# Patient Record
Sex: FEMALE | Race: WHITE | NOT HISPANIC OR LATINO | Employment: OTHER | URBAN - METROPOLITAN AREA
[De-identification: names, ages, dates, MRNs, and addresses within clinical notes are randomized per-mention and may not be internally consistent; named-entity substitution may affect disease eponyms.]

---

## 2017-02-01 ENCOUNTER — TELEPHONE (OUTPATIENT)
Dept: BEHAVIORAL HEALTH | Facility: CLINIC | Age: 72
End: 2017-02-01

## 2017-02-01 ENCOUNTER — APPOINTMENT (OUTPATIENT)
Dept: CT IMAGING | Facility: CLINIC | Age: 72
DRG: 885 | End: 2017-02-01
Attending: EMERGENCY MEDICINE
Payer: MEDICARE

## 2017-02-01 ENCOUNTER — HOSPITAL ENCOUNTER (INPATIENT)
Facility: CLINIC | Age: 72
LOS: 3 days | Discharge: HOME | DRG: 885 | End: 2017-02-04
Attending: EMERGENCY MEDICINE | Admitting: PSYCHIATRY & NEUROLOGY
Payer: MEDICARE

## 2017-02-01 DIAGNOSIS — F32.A DEPRESSION, UNSPECIFIED DEPRESSION TYPE: ICD-10-CM

## 2017-02-01 DIAGNOSIS — R45.851 SUICIDAL THOUGHTS: ICD-10-CM

## 2017-02-01 DIAGNOSIS — G43.019 INTRACTABLE MIGRAINE WITHOUT AURA AND WITHOUT STATUS MIGRAINOSUS: Primary | ICD-10-CM

## 2017-02-01 LAB
AMPHETAMINES UR QL SCN: NORMAL
ANION GAP SERPL CALCULATED.3IONS-SCNC: 7 MMOL/L (ref 3–14)
ANION GAP SERPL CALCULATED.3IONS-SCNC: NORMAL MMOL/L (ref 6–17)
BARBITURATES UR QL: NORMAL
BENZODIAZ UR QL: NORMAL
BUN SERPL-MCNC: 9 MG/DL (ref 7–30)
BUN SERPL-MCNC: NORMAL MG/DL (ref 7–30)
CALCIUM SERPL-MCNC: 8.3 MG/DL (ref 8.5–10.1)
CALCIUM SERPL-MCNC: NORMAL MG/DL (ref 8.5–10.1)
CANNABINOIDS UR QL SCN: NORMAL
CHLORIDE SERPL-SCNC: 104 MMOL/L (ref 94–109)
CHLORIDE SERPL-SCNC: NORMAL MMOL/L (ref 94–109)
CO2 SERPL-SCNC: 31 MMOL/L (ref 20–32)
CO2 SERPL-SCNC: NORMAL MMOL/L (ref 20–32)
COCAINE UR QL: NORMAL
CREAT SERPL-MCNC: 0.71 MG/DL (ref 0.52–1.04)
CREAT SERPL-MCNC: NORMAL MG/DL (ref 0.52–1.04)
D DIMER PPP FEU-MCNC: NORMAL UG/ML FEU (ref 0–0.5)
D DIMER PPP FEU-MCNC: NORMAL UG/ML FEU (ref 0–0.5)
ERYTHROCYTE [DISTWIDTH] IN BLOOD BY AUTOMATED COUNT: 11.9 % (ref 10–15)
GFR SERPL CREATININE-BSD FRML MDRD: 82 ML/MIN/1.7M2
GFR SERPL CREATININE-BSD FRML MDRD: NORMAL ML/MIN/1.7M2
GLUCOSE SERPL-MCNC: 94 MG/DL (ref 70–99)
GLUCOSE SERPL-MCNC: NORMAL MG/DL (ref 70–99)
HCT VFR BLD AUTO: 42 % (ref 35–47)
HGB BLD-MCNC: 14.6 G/DL (ref 11.7–15.7)
INTERPRETATION ECG - MUSE: NORMAL
MCH RBC QN AUTO: 33.8 PG (ref 26.5–33)
MCHC RBC AUTO-ENTMCNC: 34.8 G/DL (ref 31.5–36.5)
MCV RBC AUTO: 97 FL (ref 78–100)
OPIATES UR QL SCN: NORMAL
PCP UR QL SCN: NORMAL
PLATELET # BLD AUTO: 230 10E9/L (ref 150–450)
POTASSIUM SERPL-SCNC: 3.8 MMOL/L (ref 3.4–5.3)
POTASSIUM SERPL-SCNC: NORMAL MMOL/L (ref 3.4–5.3)
RBC # BLD AUTO: 4.32 10E12/L (ref 3.8–5.2)
SODIUM SERPL-SCNC: 142 MMOL/L (ref 133–144)
SODIUM SERPL-SCNC: NORMAL MMOL/L (ref 133–144)
TROPONIN I SERPL-MCNC: NORMAL UG/L (ref 0–0.04)
TROPONIN I SERPL-MCNC: NORMAL UG/L (ref 0–0.04)
WBC # BLD AUTO: 6.6 10E9/L (ref 4–11)

## 2017-02-01 PROCEDURE — 80048 BASIC METABOLIC PNL TOTAL CA: CPT | Performed by: EMERGENCY MEDICINE

## 2017-02-01 PROCEDURE — 90791 PSYCH DIAGNOSTIC EVALUATION: CPT

## 2017-02-01 PROCEDURE — 80307 DRUG TEST PRSMV CHEM ANLYZR: CPT | Performed by: EMERGENCY MEDICINE

## 2017-02-01 PROCEDURE — 80076 HEPATIC FUNCTION PANEL: CPT | Performed by: EMERGENCY MEDICINE

## 2017-02-01 PROCEDURE — 84443 ASSAY THYROID STIM HORMONE: CPT | Performed by: EMERGENCY MEDICINE

## 2017-02-01 PROCEDURE — 25000132 ZZH RX MED GY IP 250 OP 250 PS 637

## 2017-02-01 PROCEDURE — 80329 ANALGESICS NON-OPIOID 1 OR 2: CPT | Performed by: EMERGENCY MEDICINE

## 2017-02-01 PROCEDURE — 85027 COMPLETE CBC AUTOMATED: CPT | Performed by: EMERGENCY MEDICINE

## 2017-02-01 PROCEDURE — 85379 FIBRIN DEGRADATION QUANT: CPT | Performed by: EMERGENCY MEDICINE

## 2017-02-01 PROCEDURE — 12400006 ZZH R&B MH INTERMEDIATE

## 2017-02-01 PROCEDURE — 99285 EMERGENCY DEPT VISIT HI MDM: CPT | Mod: 25

## 2017-02-01 PROCEDURE — 25000132 ZZH RX MED GY IP 250 OP 250 PS 637: Mod: GY | Performed by: EMERGENCY MEDICINE

## 2017-02-01 PROCEDURE — 25000132 ZZH RX MED GY IP 250 OP 250 PS 637: Performed by: PSYCHIATRY & NEUROLOGY

## 2017-02-01 PROCEDURE — A9270 NON-COVERED ITEM OR SERVICE: HCPCS | Mod: GY | Performed by: EMERGENCY MEDICINE

## 2017-02-01 PROCEDURE — 93005 ELECTROCARDIOGRAM TRACING: CPT

## 2017-02-01 PROCEDURE — 70450 CT HEAD/BRAIN W/O DYE: CPT

## 2017-02-01 PROCEDURE — 84484 ASSAY OF TROPONIN QUANT: CPT | Performed by: EMERGENCY MEDICINE

## 2017-02-01 RX ORDER — RIZATRIPTAN BENZOATE 10 MG/1
10 TABLET ORAL
Status: DISCONTINUED | OUTPATIENT
Start: 2017-02-01 | End: 2017-02-04 | Stop reason: HOSPADM

## 2017-02-01 RX ORDER — ACETAMINOPHEN 500 MG
500 TABLET ORAL EVERY 4 HOURS PRN
Status: DISCONTINUED | OUTPATIENT
Start: 2017-02-01 | End: 2017-02-01

## 2017-02-01 RX ORDER — LEVOTHYROXINE SODIUM 100 UG/1
100 TABLET ORAL DAILY
Status: DISCONTINUED | OUTPATIENT
Start: 2017-02-01 | End: 2017-02-04 | Stop reason: HOSPADM

## 2017-02-01 RX ORDER — HYDROXYZINE HYDROCHLORIDE 25 MG/1
25-50 TABLET, FILM COATED ORAL EVERY 4 HOURS PRN
Status: DISCONTINUED | OUTPATIENT
Start: 2017-02-01 | End: 2017-02-04 | Stop reason: HOSPADM

## 2017-02-01 RX ORDER — MULTIPLE VITAMINS W/ MINERALS TAB 9MG-400MCG
3 TAB ORAL
Status: ON HOLD | COMMUNITY
End: 2023-04-07

## 2017-02-01 RX ORDER — VENLAFAXINE 100 MG/1
100 TABLET ORAL 2 TIMES DAILY
Status: DISCONTINUED | OUTPATIENT
Start: 2017-02-01 | End: 2017-02-04 | Stop reason: HOSPADM

## 2017-02-01 RX ORDER — LEVOTHYROXINE SODIUM 75 UG/1
75 TABLET ORAL DAILY
COMMUNITY

## 2017-02-01 RX ORDER — ACETAMINOPHEN 325 MG/1
650 TABLET ORAL ONCE
Status: COMPLETED | OUTPATIENT
Start: 2017-02-01 | End: 2017-02-01

## 2017-02-01 RX ORDER — ACETAMINOPHEN 500 MG
TABLET ORAL
Status: COMPLETED
Start: 2017-02-01 | End: 2017-02-01

## 2017-02-01 RX ORDER — TRAZODONE HYDROCHLORIDE 100 MG/1
100 TABLET ORAL AT BEDTIME
Status: DISCONTINUED | OUTPATIENT
Start: 2017-02-01 | End: 2017-02-03

## 2017-02-01 RX ORDER — ACETAMINOPHEN 500 MG
500-1000 TABLET ORAL EVERY 4 HOURS PRN
Status: DISCONTINUED | OUTPATIENT
Start: 2017-02-01 | End: 2017-02-04 | Stop reason: HOSPADM

## 2017-02-01 RX ORDER — TRAZODONE HYDROCHLORIDE 100 MG/1
100 TABLET ORAL AT BEDTIME
COMMUNITY

## 2017-02-01 RX ADMIN — ACETAMINOPHEN 500 MG: 500 TABLET, COATED ORAL at 20:46

## 2017-02-01 RX ADMIN — Medication 500 MG: at 20:46

## 2017-02-01 RX ADMIN — TRAZODONE HYDROCHLORIDE 100 MG: 100 TABLET ORAL at 20:45

## 2017-02-01 RX ADMIN — ACETAMINOPHEN 650 MG: 325 TABLET, FILM COATED ORAL at 17:34

## 2017-02-01 ASSESSMENT — ENCOUNTER SYMPTOMS
NAUSEA: 1
DYSPHORIC MOOD: 1
NERVOUS/ANXIOUS: 1
HEADACHES: 1

## 2017-02-01 ASSESSMENT — PAIN DESCRIPTION - DESCRIPTORS: DESCRIPTORS: HEADACHE

## 2017-02-01 NOTE — IP AVS SNAPSHOT
MRN:[id number]                      After Visit Summary   2/1/2017    Debbie Boucher    MRN: [id number]           Thank you!     Thank you for choosing [facility] for your care. Our goal is always to provide you with excellent care.        Patient Information     Date Of Birth          1945        About your hospital stay     You were admitted on:  February 1, 2017 You last received care in the:  [facility]    You were discharged on:  February 4, 2017       Who to Call     For medical emergencies, please call 911.  For non-urgent questions about your medical care, please call your primary care provider or clinic, [phone]          Attending Provider     Provider    Ira Wilder MD Winegarden, Thomas C, MD [place], Doroteo Lund MD       Primary Care Provider Office Phone # Fax #    [facility] [phone] [address] [facility] [address]        Further instructions from your care team       Behavioral Discharge Planning and Instructions    Summary: Admitted to hospital with suicidal ideation    Main Diagnosis: Major Depressive Disorder; Panic Disorder without agoraphobia     Major Treatments, Procedures and Findings: Psychiatric assessment; Neurology consult    Symptoms to Report: Mood getting worse or Thoughts of suicide    Lifestyle Adjustment: Develop and follow safety plan. Recommendation to re-engage in therapy to look at conflicts about pain , purpose and finding pleasure in life.    Psychiatry Follow-up:     You have a follow up psychiatry appointment with Freddie COLE at [facility] in [place] on Thursday February 16, 2017 at 9:00 am. Please arrive at 8:30 am to complete paperwork.     [facility]  [address], [address]  [place]  [address]  Phone:  [phone] / Fax:  [phone]    You have a therapy appointment scheduled with Mejia  "Supa Cobb LP at [facility] in [place] on Tuesday February 7, 2017 at 11:00 am. Please complete intake forms and bring them with you to this appointment.     [facility]  [address]  [place]  [address]  Phone:  [phone] / Fax:  [phone]    Primary care: Dr. Wendy Bergeron [facility]  [address]  . [place], [place] [address] [phone]    Resources:   Crisis Intervention: 203.471.6724 or 500-801-4743 (TTY: 841.620.9426).  Call anytime for help.  National [employer] on Mental Illness (www.[place].wilber.org): 234.974.1497 or 323-496-3195.  National Suicide Prevention Line (www.mentalhealthmn.org): 445-610-QZYX (7560)    General Medication Instructions:   See your medication sheet(s) for instructions.   Take all medicines as directed.  Make no changes unless your doctor suggests them.   Go to all your doctor visits.  Be sure to have all your required lab tests. This way, your medicines can be refilled on time.  Do not use any drugs not prescribed by your doctor.  Avoid alcohol.        Pending Results     No orders found from 1/31/2017 to 2/2/2017.            Statement of Approval     Ordered          02/03/17 1153  I have reviewed and agree with all the recommendations and orders detailed in this document.   EFFECTIVE NOW     Approved and electronically signed by:  Doroteo Dutton MD             Admission Information        Provider Department Dept Phone    2/1/2017 Doroteo Dutton MD [facility] [phone]      Your Vitals Were     Blood Pressure Pulse Temperature Respirations Weight Pulse Oximetry    147/67 mmHg 58 97.7  F (36.5  C) (Oral) 16 54.159 kg (119 lb 6.4 oz) 99%      MyChart Information     [employer] lets you send messages to your doctor, view your test results, renew your prescriptions, schedule appointments and more. To sign up, go to [email or web address] . Click on \"Log in\" on the left side of the " "screen, which will take you to the Welcome page. Then click on \"Sign up Now\" on the right side of the page.     You will be asked to enter the access code listed below, as well as some personal information. Please follow the directions to create your username and password.     Your access code is: [email or web address]  Expires: 2017  2:29 PM     Your access code will  in 90 days. If you need help or a new code, please call your [facility] clinic or [phone].        Care EveryWhere ID     This is your Care EveryWhere ID. This could be used by other organizations to access your [facility] medical records  [id number]           Review of your medicines      START taking        Dose / Directions    hydrOXYzine 25 MG tablet   Commonly known as:  ATARAX   Used for:  Depression, unspecified depression type        Dose:  25-50 mg   Take 1-2 tablets (25-50 mg) by mouth every 6 hours as needed for anxiety   Quantity:  120 tablet   Refills:  0       mirtazapine 7.5 MG Tabs tablet   Commonly known as:  REMERON   Used for:  Depression, unspecified depression type        Dose:  7.5 mg   Take 1 tablet (7.5 mg) by mouth At Bedtime   Quantity:  30 tablet   Refills:  0         CONTINUE these medicines which have NOT CHANGED        Dose / Directions    LEVOTHYROXINE SODIUM PO   Indication:  Underactive Thyroid        Dose:  100 mcg   Take 100 mcg by mouth daily   Refills:  0       MAXALT PO        Dose:  10 mg   Take 10 mg by mouth once as needed for migraine   Refills:  0       multivitamin, therapeutic with minerals Tabs tablet        Dose:  3 tablet   Take 3 tablets by mouth 2 times daily   Refills:  0       TRAZODONE HCL PO   Indication:  Trouble Sleeping, may repeat times 1        Dose:  100 mg   Take 100 mg by mouth At Bedtime   Refills:  0       VENLAFAXINE HCL PO   Indication:  Major Depressive Disorder        Dose:  100 mg   Take 100 mg by mouth 2 times daily   Refills:  0            Where to get your medicines      These " medications were sent to [facility] [place], [place] - [address]  [address] [place] [address]     Phone:  [phone]    - hydrOXYzine 25 MG tablet  - mirtazapine 7.5 MG Tabs tablet             Protect others around you: Learn how to safely use, store and throw away your medicines at www.disposemymeds.org.             Medication List: This is a list of all your medications and when to take them. Check marks below indicate your daily home schedule. Keep this list as a reference.      Medications           Morning Afternoon Evening Bedtime As Needed    hydrOXYzine 25 MG tablet   Commonly known as:  ATARAX   Take 1-2 tablets (25-50 mg) by mouth every 6 hours as needed for anxiety                                LEVOTHYROXINE SODIUM PO   Take 100 mcg by mouth daily   Last time this was given:  100 mcg on 2/3/2017  8:47 AM                                MAXALT PO   Take 10 mg by mouth once as needed for migraine                                mirtazapine 7.5 MG Tabs tablet   Commonly known as:  REMERON   Take 1 tablet (7.5 mg) by mouth At Bedtime   Last time this was given:  7.5 mg on 2/2/2017  8:47 PM                                multivitamin, therapeutic with minerals Tabs tablet   Take 3 tablets by mouth 2 times daily                                TRAZODONE HCL PO   Take 100 mg by mouth At Bedtime   Last time this was given:  100 mg on 2/2/2017  8:47 PM                                VENLAFAXINE HCL PO   Take 100 mg by mouth 2 times daily   Last time this was given:  100 mg on 2/3/2017  9:15 PM                                          More Information        Depression: Tips to Help Yourself  As your health care providers help treat your depression, you can also help yourself. Keep in mind that your illness affects you emotionally, physically, mentally, and socially. So full recovery will take time. Take care of your body and your soul, and be patient with yourself as you get  better.    Be with others  Don t isolate yourself--you ll only feel worse. Try to be with other people. And take part in fun activities when you can. Go to a movie, ballgame, Mandaeism service, or social event. Talk openly with people you can trust. And accept help when it s offered.  Keep your perspective    Depression can cloud your judgment. So wait until you feel better before making major life decisions, such as changing jobs, moving, or getting  or .    This illness is not your fault. Don t blame yourself for your depression.    Recovering from depression is a process. Don t be discouraged if it takes some time to feel better.    Depression saps your energy and concentration. So you won t be able to do all the things you used to do. Set small goals and do what you can.  Take care of your body  People with depression often lose the desire to take care of themselves. That only makes their problems worse. During treatment and afterward, make a point to:    Exercise. It s a great way to take care of your body. And studies have shown that exercise helps fight depression.    Avoid drugs and alcohol. These may ease the pain in the short term. But they ll only make your problems worse in the long run.    Get relief from stress. Ask your healthcare provider for relaxation exercises and techniques to help relieve stress.    Eat right. A balanced and healthy diet helps keep your body healthy.    4392-2038 [employer]. [address] [place] [address], [place] [address]. All rights reserved. This information is not intended as a substitute for professional medical care. Always follow your healthcare professional's instructions.

## 2017-02-01 NOTE — IP AVS SNAPSHOT
[place] [address] [place] [address]    Phone:  [phone]                                       After Visit Summary   2/1/2017    Debbie Boucher    MRN: [id number]           After Visit Summary Signature Page     I have received my discharge instructions, and my questions have been answered. I have discussed any challenges I see with this plan with the nurse or doctor.    ..........................................................................................................................................  Patient/Patient Representative Signature      ..........................................................................................................................................  Patient Representative Print Name and Relationship to Patient    ..................................................               ................................................  Date                                            Time    ..........................................................................................................................................  Reviewed by Signature/Title    ...................................................              ..............................................  Date                                                            Time

## 2017-02-01 NOTE — TELEPHONE ENCOUNTER
S - Verenice LOPEZ from [facility] in [place] regarding 71 female with SI and plan.     B - Pt had episode this morning, possible panic attack.  EMT's evaluated pt, recommended pt go to urgent care.  During visit, pt revealed severe depression, suicidal with plan to drive to [place] to get meds to take until she falls asleep.  Pt has no drive to live any longer.  Pt does not feel safe or that she would be able to reach out for help if got the urge to attempt.  Pt thinks about suicide at least weekly.  Pt on Effexor prescribed by PCP, Arlin Young.  Pt ran out and has not taken for 2 days. Pt also takes trazodone to help her sleep.  Pt lives in [place] by herself, no services being utilized currently. No reported cognitive deficits.      Hx of hypothyroidism, osteoporosis, chronic headaches.  Pt stated that she's lonely, doesn't feel that she can't reach out to her family members because she doesn't want to be a burden.  Pt's son is with her, pt has been isolating from family lately per son.      A - pt will need MH evaluation for increased depression and suicidal ideation    R - Pt's son will be bringing pt to [facility] ED for MH.

## 2017-02-01 NOTE — ED NOTES
Debbie reported not feeling well for the last few days.  She was at home and she had a panic attack , she has been feeling overwhelmed and frustrated. She thought about suicide but does not have a plan.  She has been suffering of headaches, rates her pain at 7 in the scale of 0 to 10.  Headaches have been really bad for the last 2 months, takes tylenol with no relief.  Suffers from depression and anxiety, she has never seen a psychiatrist before, medications are ordered by Internal Medicine Doctor.  She is a pleasant lady, lying in bed, denies any suicidal ideation and contracts for safety.  Her son is with her in room.  Admission completed.

## 2017-02-01 NOTE — ED PROVIDER NOTES
"  History     Chief Complaint:  Suicidal Ideation    HPI   Debbie Boucher is a 71 year old female with a history of depressive disorder, anxiety, and migraines who presents to the emergency department today for evaluation of suicidal ideation. She reports having not taken her medications for anxiety or depression for the past two days as she ran out. Earlier she was seen at urgent care because of possible panic attack but does not have a history of them. She felt \"overloaded\" with emotion this morning in an attempt to keep all her feelings of depression and anxiety inside her. The episode included her having physical discomfort with nausea but no chest pain. Felt weak all over her body like she was going to pass out. Went to  where it was reported that she a plan to kill her self in [place]. She then called her son who wanted her to come into the ED for evaluation. For about the last several years she has had a headache for which she is taking Tylenol every 4 hours. The patient has not been using any drugs. The patient has looked into a plan to commit suicide including leaving the [place] through [employer]. Wants to kill herself on her own timing. Does not want to be a burden on her son. Does not feel that anyone would miss her. She feels that she has no hope of getting relief from her pain from her headache.  Has seen several providers for headaches but not neurology. The patient has not seen a therapist or psychologist recently. Has not done anything to harm herself.    Allergies:  Codeine  Contrast Dye  Levofloxacin  Sulfa Drugs     Medications:    The patient is currently on no regular medications.     Past Medical History:    Depressive disorder  Anxiety  Migraines     Past Surgical History:    Tonsillectomy and adenoidectomy  Appendectomy  Cholecystectomy  Hernia repair  Hysterectomy  Bladder neck suspension      Family History:    History reviewed. No pertinent family history.      Social " History:  The patient was accompanied to the ED by son.  Marital Status:   [2]    Review of Systems   Cardiovascular: Negative for chest pain.   Gastrointestinal: Positive for nausea.   Neurological: Positive for headaches.   Psychiatric/Behavioral: Positive for suicidal ideas and dysphoric mood. The patient is nervous/anxious.    All other systems reviewed and are negative.    Physical Exam     Patient Vitals for the past 24 hrs:   BP Temp Temp src Heart Rate Resp SpO2 Weight   02/01/17 1849 - 98  F (36.7  C) Oral - - - 54.159 kg (119 lb 6.4 oz)   02/01/17 1407 (!) 137/125 mmHg 97.6  F (36.4  C) Oral 69 12 99 % -       Physical Exam  General: Resting comfortably on the gurney  Eyes:  The pupils are equal and round  ENT:    Atraumatic  Neck:  Normal range of motion    No neck stiffness  CV:  Regular rate and rhythm    Skin warm and well perfused   Resp:  Lungs are clear    Non-labored    No rales    No wheezing  MS:  Normal muscular tone  Skin:  No rash or acute skin lesions noted  Neuro:   Awake, alert.      Speech is normal and fluent.    Face is symmetric.     Moves all extremities  Psych:  Normal affect. Good eye contact. Appropriate interactions.     Emergency Department Course     ECG:  ECG taken at 1528, ECG read at 1533  Normal sinus rhythm  Normal ECG  Rate 63 bpm. GA interval 140. QRS duration 86. QT/QTc 434/444. P-R-T axes 44 32 53.     Imaging:  Radiology findings were communicated with the patient who voiced understanding of the findings.    Head CT w/o contrast   IMPRESSION: Normal atrophy. Nothing acute.     Laboratory:  Laboratory findings were communicated with the patient who voiced understanding of the findings.    Drug abuse screen 77 urine: negative  CBC: WBC 6.6, HGB 14.6,   BMP: Calcium: 8.3 (L) o/w WNL (Creatinine 0.71)  Troponin (Collected 1704): <0.015  D Dimer (Collected 1704): <0.3    Interventions:  1734 Tylenol 650 mg oral       Emergency Department Course:  Nursing notes  and vitals reviewed.  I performed an exam of the patient as documented above.   The patient was sent for a head CT without contrast while in the emergency department, results above.    IV was inserted and blood was drawn for laboratory testing, results above.  The patient provided a urine sample here in the emergency department. This was sent for laboratory testing, findings above.  I have placed the patient on a 72 hour hold.    I discussed the treatment plan with the patient. They expressed understanding of this plan and consented to admission.   I personally reviewed the laboratory and imaging results with the patient and answered all related questions prior to admission.    Impression & Plan      Medical Decision Making:  Debbie Boucher is a 71 year old female who presents to the emergency department with depression, suicidal thoughts. Vital signs within normal limits. She was sent here from clinic after reported concerning thoughts of wanting to die with plan. She reports to me that she has had worsening depression and has been searching how to end her life at a time that she wants to. Has not acted on this but is actively trying to figure out how to do it. High risk given age, lives alone. Also complains of what sounds like presyncopal episode this morning and chronic headaches. She feels that what happened this morning is likely related to the anxiety and stress that has been going on and more related to her mental health than physical health at this point. EKG shows sinus rhythm without acute ischemic changes. Troponin, DDimer, BMP, CBC were unremarkable. CT head also unremarkable. Patient medically clear and evaluated by DEC who also felt that patient should be admitted to the hospital for psychiatric stabilization. Placed on a 72 hour hold and patient has a bed available here. Seems to minimize her depression and suicidal thoughts/plan to people and is not very forthcoming as she did not tell the  initial nurse that she has a plan.    Diagnosis:    ICD-10-CM    1. Depression, unspecified depression type F32.9    2. Suicidal thoughts R45.851      Disposition:   Admission    Scribe Disclosure:  I, Coleman Roberson, am serving as a scribe at 2:09 PM on 2/1/2017 to document services personally performed by Ira Wilder MD, based on my observations and the provider's statements to me.   2/1/2017    EMERGENCY DEPARTMENT        Ira Wilder MD  02/02/17 0000

## 2017-02-02 LAB
ALBUMIN SERPL-MCNC: 3.8 G/DL (ref 3.4–5)
ALP SERPL-CCNC: 68 U/L (ref 40–150)
ALT SERPL W P-5'-P-CCNC: 23 U/L (ref 0–50)
APAP SERPL-MCNC: 3 MG/L (ref 10–20)
AST SERPL W P-5'-P-CCNC: 30 U/L (ref 0–45)
BILIRUB DIRECT SERPL-MCNC: <0.1 MG/DL (ref 0–0.2)
BILIRUB SERPL-MCNC: 0.2 MG/DL (ref 0.2–1.3)
PROT SERPL-MCNC: 7.2 G/DL (ref 6.8–8.8)
TSH SERPL DL<=0.005 MIU/L-ACNC: 0.65 MU/L (ref 0.4–4)

## 2017-02-02 PROCEDURE — A9270 NON-COVERED ITEM OR SERVICE: HCPCS | Mod: GY | Performed by: PHYSICIAN ASSISTANT

## 2017-02-02 PROCEDURE — 25000132 ZZH RX MED GY IP 250 OP 250 PS 637: Mod: GY | Performed by: PHYSICIAN ASSISTANT

## 2017-02-02 PROCEDURE — 25000132 ZZH RX MED GY IP 250 OP 250 PS 637: Mod: GY | Performed by: PSYCHIATRY & NEUROLOGY

## 2017-02-02 PROCEDURE — 25000132 ZZH RX MED GY IP 250 OP 250 PS 637: Performed by: INTERNAL MEDICINE

## 2017-02-02 PROCEDURE — 99223 1ST HOSP IP/OBS HIGH 75: CPT | Mod: AI | Performed by: PHYSICIAN ASSISTANT

## 2017-02-02 PROCEDURE — 90791 PSYCH DIAGNOSTIC EVALUATION: CPT

## 2017-02-02 PROCEDURE — A9270 NON-COVERED ITEM OR SERVICE: HCPCS | Mod: GY | Performed by: PSYCHIATRY & NEUROLOGY

## 2017-02-02 PROCEDURE — 12400006 ZZH R&B MH INTERMEDIATE

## 2017-02-02 RX ORDER — MIRTAZAPINE 7.5 MG/1
7.5 TABLET, FILM COATED ORAL AT BEDTIME
Status: DISCONTINUED | OUTPATIENT
Start: 2017-02-02 | End: 2017-02-03

## 2017-02-02 RX ORDER — BUTALBITAL/ASPIRIN/CAFFEINE 50-325-40
1 CAPSULE ORAL EVERY 4 HOURS PRN
Status: DISCONTINUED | OUTPATIENT
Start: 2017-02-02 | End: 2017-02-04 | Stop reason: HOSPADM

## 2017-02-02 RX ORDER — HYDROCODONE BITARTRATE AND ACETAMINOPHEN 5; 325 MG/1; MG/1
1 TABLET ORAL ONCE
Status: COMPLETED | OUTPATIENT
Start: 2017-02-02 | End: 2017-02-02

## 2017-02-02 RX ADMIN — TRAZODONE HYDROCHLORIDE 100 MG: 100 TABLET ORAL at 20:47

## 2017-02-02 RX ADMIN — MIRTAZAPINE 7.5 MG: 7.5 TABLET, FILM COATED ORAL at 20:47

## 2017-02-02 RX ADMIN — ACETAMINOPHEN 1000 MG: 500 TABLET, COATED ORAL at 05:29

## 2017-02-02 RX ADMIN — BUTALBITAL, ASPIRIN, AND CAFFEINE 1 CAPSULE: 50; 325; 40 CAPSULE ORAL at 12:20

## 2017-02-02 RX ADMIN — BUTALBITAL, ASPIRIN, AND CAFFEINE 1 CAPSULE: 50; 325; 40 CAPSULE ORAL at 20:44

## 2017-02-02 RX ADMIN — HYDROCODONE BITARTRATE AND ACETAMINOPHEN 1 TABLET: 5; 325 TABLET ORAL at 01:26

## 2017-02-02 RX ADMIN — LEVOTHYROXINE SODIUM 100 MCG: 100 TABLET ORAL at 08:14

## 2017-02-02 RX ADMIN — VENLAFAXINE 100 MG: 100 TABLET ORAL at 08:14

## 2017-02-02 RX ADMIN — VENLAFAXINE 100 MG: 100 TABLET ORAL at 20:47

## 2017-02-02 RX ADMIN — BUTALBITAL, ASPIRIN, AND CAFFEINE 1 CAPSULE: 50; 325; 40 CAPSULE ORAL at 16:47

## 2017-02-02 RX ADMIN — ACETAMINOPHEN 1000 MG: 500 TABLET, COATED ORAL at 08:38

## 2017-02-02 ASSESSMENT — ACTIVITIES OF DAILY LIVING (ADL)
DRESS: SCRUBS (BEHAVIORAL HEALTH)
ORAL_HYGIENE: INDEPENDENT
GROOMING: INDEPENDENT

## 2017-02-02 NOTE — H&P
"PSYCHIATRIC EVALUATION      DATE OF ADMISSION:  02/01/2017.      DATE OF SERVICE:  02/02/2017.        IDENTIFYING DATA AND REASON FOR REFERRAL:  Debbie Boucher is a 71-year-old woman who reports she is  and has 2 children.  She reports she is a retired  who presented to the ED here at [facility] on account of depression and suicidal ideation.  She was admitted on a 72-hour hold that expires on 02/06/2017 at 6:34 p.m.      CHIEF COMPLAINT:  \"My depression has just gotten bad.\"      HISTORY OF PRESENT ILLNESS:  Debbie Boucher reports that she has been on Effexor for about 20 years.  She reports that she had not changed the dose of the medication in more than 10 years.  She states she had been doing quite well until a few months ago when she developed persistent and intractable headaches.  She states she takes Tylenol pretty much several times a day to alleviate her discomfort, but this has not been very beneficial.  She states she has a history of anxiety and depression and has been contemplating suicide because she thinks she may have a life ending condition that is yet to be diagnosed.  She reports she has contemplated ways of ending her life on her own terms because she believes that she has a right to do that.  Per her report, she had considered going to [place] to get medications to prepare herself for such eventuality but at this time she denies that she is actively planning to harm herself.  She states she never wants to be a burden to her children and so she was thinking should she be diagnosed with a life-threatening condition she wanted that option.  She had been prescribed Effexor by her primary care physician, Dr. Arlin Young, but she ran out of the medications about a couple days ago.  She states she has experienced anhedonia, depressed mood, poor appetite, frequent awakenings and weight loss.  She states she occasionally experiences " racing thoughts, but denies symptoms suggestive of suzan or psychosis.  She denies any cognitive deficits but also reports that she experiences panic attacks characterized by palpitations, shortness of breath, restlessness and difficulty concentrating.  She admits that there are times that she feels that her family would be better off without her.  She thinks there has no purpose for her being alive.  She reports that she and her ex- do get along as they were  for 42 years before they got .  She denies use of alcohol or illicit chemicals.  She reports she is yet to see a neurologist regarding her headaches.      PAST PSYCHIATRIC HISTORY:  The patient denies previous psychiatric hospitalization.  She states she does not have a psychiatrist but previously saw someone named Bob for individual psychotherapy.  She states she has not seen a therapist in many years.      CHEMICAL USE HISTORY:  None reported.      PAST MEDICAL HISTORY:  Chronic headaches, migraines and hypothyroidism.      PAST SURGICAL HISTORY:  Tonsillectomy and adenoidectomy, appendectomy, cholecystectomy, hernia repair, hysterectomy, bladder neck suspension and gastric bypass.      MEDICATIONS PRIOR TO ADMISSION:   1.  Synthroid 100 mcg p.o. daily.   2.  Effexor 100 mg p.o. b.i.d.   3.  Trazodone 100 mg p.o. each day at bedtime.   4.  Multivitamin 3 p.o. b.i.d.   5.  Maxalt 10 mg p.o. once daily p.r.n. migraine.      ALLERGIES:  Codeine, contrast dye, levofloxacin and sulfa drugs.      FAMILY PSYCHIATRIC HISTORY:  None reported.      SOCIAL HISTORY:  The patient reports she grew up in [place], [place].  She had an older brother and a younger brother, both of whom are .  She reports that her parents had a tough life.  Her father reportedly had a hole in the heart and her mother was obese and diabetic.  She states that her parents were very stoic and authoritarian.  She reports she graduated high school.  Prior to that  her parents moved the family to [place].  She met her  in [place] as he was in the [employer].  Shortly after they got  they moved to [facility] where they resided for a little over 2 years before her  decided to go back to school.  [place] he went to school.  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  She states she and her  decided to separate about 10 years ago because she had grown more independent from the passive and submissive individual she had been earlier on in their marriage.  She states her  could not adjust to her more assertive personality and so they decided to separate.  They ultimately got  8 years ago but remain friends.  She denies criminal or  history.      REVIEW OF SYSTEMS:  A 10-point review of systems was negative apart from the pertinent positives in the history of present illness.      PHYSICAL EXAMINATION:   VITAL SIGNS:  Blood pressure 160/92, pulse 58, respirations 16, temperature 97.8.  Weight 119 pounds.      MENTAL STATUS EXAMINATION:  Debbie Boucher is a 71-year-old lady who appears her stated age.  She is dressed in hospital scrubs and ambulates on her own without difficulty.  She makes good eye contact.  She speaks softly but clearly and coherently.  Her mood is depressed and her affect is restricted in range.  Her thought process is logical, relevant and goal directed.  Her fund of knowledge is adequate.  She is alert and oriented to time, place and person.  Her gait and station are within normal limits.  Her associations are tight and her language is appropriate.  Her recall of recent and remote events is adequate.  Her impulse control is marginal.  Her attention and concentration are fair.  She displays limited insight and judgment.  Risk assessment  at this time is considered moderate to high on account of her feelings of hopelessness, worthlessness and a perception of having a right to end her life.      DIAGNOSTIC IMPRESSION:  Ramya Cm is a 71-year-old lady with known history of anxiety and depression who presents to the hospital for evaluation on account of worsening symptoms in the context of going off her long-term antidepressant medications a couple days ago because she could not get a refill from her primary care physician.  She reports florid symptoms of depression as well as persistent migraine type headaches that have been going on for more than 3 months without relief.      DIAGNOSES:   1.  Major depressive disorder, recurrent, severe without psychosis.   2.  Panic disorder without agoraphobia.   3.  Migraines.   4.  Hypothyroidism.      RECOMMENDATIONS:  The patient will be maintained on the ITC and moved to step-down unit when a bed becomes available.  She is currently denying any self-harm thoughts or plans.  She will be started on mirtazapine at 7.5 mg at bedtime while maintaining her currently prescribed dose of venlafaxine at 100 mg b.i.d.  She will be encouraged to participate in individual milieu and group therapy.  She will benefit from outpatient psychotherapy and medication management.  On account of her persistent migraine type headaches Neurology has been consulted by the hospitalist and we look forward to their consultation.  Estimated length of stay 3-4 days.         YVONNE JOHNS MD             D: 2017 12:42   T: 2017 13:21   MT: DOROTEO      Name:     RAMYA CM   MRN:      [id number]        Account:      [id number]   :      1945           Admitted:     [id number]      Document: [id number]

## 2017-02-02 NOTE — PLAN OF CARE
Problem: Depressive Symptoms  Goal: Depressive Symptoms  1. Mood stability   2. Absence of suicidal ideation/contracts for safety   3. Depressive s/sx resolved  4. Safety plan in place  5. Positive coping skills identified/utilized  6. Medication regiment established/compliance  7. Adequate sleep  8. Housing community support  9. F/u plan in place   Outcome: No Change  Debbie reported not feeling well for the last few days.  She was at home and she had a panic attack , she has been feeling overwhelmed and frustrated. She thought about suicide but does not have a plan.  She has been suffering of headaches, rates her pain at 7 in the scale of 0 to 10.  Headaches have been really bad for the last 2 months, takes tylenol with no relief. Fioricet ordered for relief with good results today.   Suffers from depression and anxiety, she has never seen a psychiatrist before today. She is a pleasant lady,ambulating between her room and the lounge.Denies any suicidal ideation and contracts for safety.

## 2017-02-02 NOTE — PROGRESS NOTES
Pt c/o of severe headache, Tylenol given, ice pack applied, lights dimmed, and lavender given to pt for relaxation with no relief. Writer called on call and Dr Puga ordered one time order for Norco was given and effective, Pt was able to rest in bed. Neuro consult ordered.

## 2017-02-02 NOTE — PROGRESS NOTES
HOSPITALIST SERVICE CROSS-COVER NOTE:    Ordered Norco x1 dose for headache.      Roxy Puga MD  Hospitalist  Pager # [phone]

## 2017-02-02 NOTE — H&P
DATE OF SERVICE:  02/02/2017      PR[facility]IC[facility].      CHIEF COMPLAINT:  Depression and suicidal ideation.      HISTORY OF PRESENT ILLNESS:  Debbie Boucher is a very pleasant 71-year-old female with a past medical history of depressive disorder, anxiety and headaches who presented to the Emergency Department on 02/01/2017 for evaluation of suicidal ideation.  The patient reports not having taken her medications for anxiety or depression over the last couple days as she had run out of them.  She was seen at urgent care due to a panic attack which she has not had for quite some time.  She stated she felt overwhelmed with emotion trying to deal with her depression and anxiety as well as her chronic headaches.  She had what sounds like a presyncopal episode with full body weakness and feeling like she was going to pass out.  She did not end up losing consciousness.  She had no chest pain but did have some nausea.  She did come into the Emergency Department for evaluation.  The patient states she has had chronic headaches for which she has been taking 1000 mg of Tylenol every 4 hours for the last couple of months.  She has seen several providers for headaches but not a neurologist.  The patient had a plan to travel to [place] to kill herself as she did not want to be a burden on her son.      The patient was evaluated in the Emergency Department by Dr. Wilder.  There she was found to have a blood pressure of 137/125 with a temperature of 97.6 and heart rate of 69.  Her EKG showed normal sinus rhythm with no ischemic changes.  CT of head was performed that showed no acute changes.  Her blood work was unremarkable including a negative D-dimer, negative troponin and normal CBC and BMP.  Her drug abuse screen was negative.  Given her ongoing depression and suicidal ideation she was placed on a 72-hour hold and admitted to Inpatient Psychiatry.      The patient is currently resting  comfortably in bed on the ITC.  She continues to endorse feelings of depression.  She currently has a headache similar to her typical chronic headache symptoms.  She received a dose of Norco overnight that did help somewhat.  She would like to have something other than Tylenol to help manage the pain.  She otherwise denies any fevers, chills, recent illness, chest pain, shortness of breath, abdominal pain, vomiting, diarrhea, dysuria or focal weakness, numbness or tingling.      PAST MEDICAL HISTORY:   1.  Depressive disorder.   2.  Anxiety.   3.  Migraines.   4.  Chronic headaches.      PAST SURGICAL HISTORY:   1.  Tonsillectomy and adenoidectomy.   2.  Appendectomy.   3.  Cholecystectomy.   4.  Hernia repair.   5.  Hysterectomy.   6.  Bladder neck suspension.   7.  Gastric bypass.      FAMILY HISTORY:  Mother and son had diabetes.  She otherwise denies any chronic health conditions in her family members.      SOCIAL HISTORY:  The patient is .  She is retired.  She is a lifelong nonsmoker.  She drinks alcohol very rarely.  She does not use any street drugs.      PRIOR TO ADMISSION MEDICATIONS:   1.  Levothyroxine 100 mcg daily.   2.  Venlafaxine 100 mg b.i.d.   3.  Trazodone 100 mg at bedtime.   4.  Multivitamin 3 tablets 2 times daily.   5.  Maxalt 10 mg once as needed.      ALLERGIES:  Codeine, contrast dye, levofloxacin and sulfa drugs.      REVIEW OF SYSTEMS:  A complete 10-point review of systems was performed and is negative other than the items previously mentioned above in HPI.      PHYSICAL EXAMINATION:   VITAL SIGNS:  Blood pressure 162/92, heart rate 58 beats per minute, temperature 97.9, respiration rate 16, oxygen saturation 99% on room air.   GENERAL:  The patient is alert, oriented to person, place and situation, cooperative, sitting on the edge of bed in no apparent distress.   HEENT:  Pupils equal, round and reactive to light, extraocular movements intact.  Head normocephalic, atraumatic.   Throat, lips, mucosa and tongue appear moist and normal.   NECK:  Supple.   CARDIOVASCULAR:  Heart regular rate and rhythm, no murmur, rub or gallop.  Distal pulses are intact.  No edema.   PULMONARY:  Lungs are clear to auscultation bilaterally, no crackles, wheezes or rhonchi.  Breathing nonlabored.   GASTROINTESTINAL:  Abdomen soft, nontender, nondistended with normoactive bowel sounds.   MUSCULOSKELETAL:  The patient moves all 4 extremities equally with normal strength.   NEUROLOGIC:  Alert and oriented.  Cranial nerves II-XII are grossly intact.  Motor function is intact without any focal deficits.   SKIN:  Cool, dry, nondiaphoretic.   PSYCHIATRIC:  Normal mood and affect.      LABORATORY DATA:  CMP:  Potassium 3.8, creatinine 0.71, calcium 8.3, glucose 94, otherwise all within normal limits.  Troponin less than 0.015.  TSH 0.65.  LFTs are all within normal limits.  D-dimer is less than 0.3.  Urine tox screen is negative.  CBC:  WBC 6.6, hemoglobin 14.6, hematocrit 42.0, platelet count 230.      EKG:  Normal sinus rhythm, no ST-T wave changes.      CT head without contrast, per Radiology, there is minimal atrophy.  No hemorrhage, mass lesion or focal area of acute infarction identified.  Paranasal sinuses are normal.  No bony abnormality.  Nothing acute.      ASSESSMENT AND PLAN:  Debbie Boucher is a pleasant 71-year-old female with a past medical history of depression, anxiety and headaches who presented to the Emergency Department with suicidal ideation.  She has been admitted on a 72-hour hold to inpatient psychiatry for further evaluation and stabilization.     1.  Depressive disorder with suicidal ideation.  The patient reports worsening depression and has been off her medications in the last couple of days.  She has a plan to kill herself in [place].  She is on a 72-hour hold.  We will have Psychiatry to see and will defer to their management.   2.  Hypothyroidism:  The patient's TSH is within normal  limits at 0.65.  Will continue home levothyroxine.   3.  Chronic headache.  The patient describes a tension like a headache that is nearly constant and occurs daily.  She does have a history of migraines, although states that this headache is different than those episodes.  She does, however, have some associated sensitivity to light and noise.  She has been treating with Tylenol with some increased frequency.  She states she is taking 1000 mg every 4 hours and has been doing so for the last couple of months.  We checked her LFTs which are normal.  Acetaminophen level is pending.  She was instructed not to take more than 4 grams of acetaminophen in a 24-hour span.  She would like to try something other than Tylenol and therefore, will trial Fiorinal.  Will have Neurology to see.  As stated above, her CT head was without any acute changes.   4.  Elevated blood pressure.  The patient's blood pressures have been high since arrival to the hospital.  This likely is in part due to emotional stress and anxiety.  She does not carry a previous diagnosis of hypertension.  Will chart check again tomorrow and if her blood pressures remain elevated, we may need to consider starting her on an antihypertensive agent.   5.  Deep venous thrombosis prophylaxis:  This will be mechanical with ambulation.      CODE STATUS:  Full code by default.        This patient [facility]  He is in agreement with my assessment and plan of care.         MARIANO ROMANO MD       As dictated by HALLEY ERICKSON PA-C            D: 2017 09:53   T: 2017 10:26   MT: alice      Name:     RAMYA CM   MRN:      [id number]        Account:      [id number]   :      1945           Admitted:     [id number]      Document: [id number]       cc: [facility]

## 2017-02-02 NOTE — PROGRESS NOTES
02/01/17 2058   Patient Belongings   Did you bring any home meds/supplements to the hospital?  No   Patient Belongings other (see comments)   Belongings Search Yes   Clothing Search Yes   Second Staff Markie       Coat w/ julio césar  Boots w/ string  Jeans  t-shirt  Phone  $5 cash  [place] x 12      Admission Signature_____________________________                 Date________________      Discharge Signature_____________________________                 Date________________

## 2017-02-02 NOTE — H&P
Case Management Psycho-Social Assessment    This information has been obtained from the patient's chart and from a personal interview with the patient.     Reason for Admission: Admitted to hospital with suicidal ideation.    Previous Mental & Chemical Health: No previous mental health hospitalizations. Has had some outpatient therapy- though not in last 10 years. Has been on Effexor for years for depression.   No history of chemical dependency treatment; denies use of alcohol or drugs.    Family History:  Grew up in [place], [place] and then [place], [place] in an intact family, middle child with 2 brothers. Father was very authoritarian and mother acquiesced. Both parents and both brothers are . Father could be verbally abusive.   Patient was  42 years,  2 years and  for 8. She and her former  raised 2 children, son,Joaquín and daughter, Jen. She is close to her children and has an amicable relationship with her ex- .    Current Living Situation:   Lives in a [facility] in [place].    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 She says she is the organizer of activities- both family and with friends. She would like others to reciprocate more, as she often feels cared for only as the organizer.   No  service history.   No Christianity or el[employer] base.     Insurance:  Medicare and BCBS.    Legal Issues :   denies    SS Assessment Needs & Plan:  Patient is not currently suicidal; said she would complete a safety plan. She said her talk about suicide was a cry for help. She is awaiting a neurology consult regarding the headaches she has suffered with. She is willing to get back into therapy.  to assist with referrals as needed.

## 2017-02-02 NOTE — PROGRESS NOTES
.Nursing assessment complete including patient and medication profiles. Risk assessments completed addressing suicide,fall,skin,nutrition and safety issues. Care plan initiated. Assessments reviewed with physician and admit orders received. Welcome packet reviewed with patient. Information reviewed includes getting emergency help, preventing infections, understanding your care, using medication safely, reducing falls, preventing pressure ulcers, smoking cessation, powerful choices and Patients Bill of Rights. Pt. given tour of the unit and instruction on use of facility including emergency call light. Program schedule reviewed with patient. Questions regarding the unit addressed. Pt. Search completed and belongings inventoried.

## 2017-02-03 LAB — ERYTHROCYTE [SEDIMENTATION RATE] IN BLOOD BY WESTERGREN METHOD: 10 MM/H (ref 0–30)

## 2017-02-03 PROCEDURE — 25000132 ZZH RX MED GY IP 250 OP 250 PS 637: Mod: GY | Performed by: PSYCHIATRY & NEUROLOGY

## 2017-02-03 PROCEDURE — 12400006 ZZH R&B MH INTERMEDIATE

## 2017-02-03 PROCEDURE — 85652 RBC SED RATE AUTOMATED: CPT | Performed by: PSYCHIATRY & NEUROLOGY

## 2017-02-03 PROCEDURE — A9270 NON-COVERED ITEM OR SERVICE: HCPCS | Mod: GY | Performed by: PHYSICIAN ASSISTANT

## 2017-02-03 PROCEDURE — 36415 COLL VENOUS BLD VENIPUNCTURE: CPT | Performed by: PSYCHIATRY & NEUROLOGY

## 2017-02-03 PROCEDURE — 25000132 ZZH RX MED GY IP 250 OP 250 PS 637: Mod: GY | Performed by: PHYSICIAN ASSISTANT

## 2017-02-03 PROCEDURE — A9270 NON-COVERED ITEM OR SERVICE: HCPCS | Mod: GY | Performed by: PSYCHIATRY & NEUROLOGY

## 2017-02-03 RX ORDER — HYDROXYZINE HYDROCHLORIDE 25 MG/1
25-50 TABLET, FILM COATED ORAL EVERY 6 HOURS PRN
Qty: 120 TABLET | Refills: 0 | Status: SHIPPED | OUTPATIENT
Start: 2017-02-03 | End: 2023-04-04

## 2017-02-03 RX ORDER — MIRTAZAPINE 7.5 MG/1
7.5 TABLET, FILM COATED ORAL AT BEDTIME
Qty: 30 TABLET | Refills: 0 | Status: SHIPPED | OUTPATIENT
Start: 2017-02-03 | End: 2017-02-04

## 2017-02-03 RX ADMIN — VENLAFAXINE 100 MG: 100 TABLET ORAL at 08:47

## 2017-02-03 RX ADMIN — BUTALBITAL, ASPIRIN, AND CAFFEINE 1 CAPSULE: 50; 325; 40 CAPSULE ORAL at 22:49

## 2017-02-03 RX ADMIN — LEVOTHYROXINE SODIUM 100 MCG: 100 TABLET ORAL at 08:47

## 2017-02-03 RX ADMIN — BUTALBITAL, ASPIRIN, AND CAFFEINE 1 CAPSULE: 50; 325; 40 CAPSULE ORAL at 09:03

## 2017-02-03 RX ADMIN — BUTALBITAL, ASPIRIN, AND CAFFEINE 1 CAPSULE: 50; 325; 40 CAPSULE ORAL at 12:29

## 2017-02-03 RX ADMIN — VENLAFAXINE 100 MG: 100 TABLET ORAL at 21:15

## 2017-02-03 ASSESSMENT — ACTIVITIES OF DAILY LIVING (ADL)
ORAL_HYGIENE: INDEPENDENT
GROOMING: INDEPENDENT
DRESS: SCRUBS (BEHAVIORAL HEALTH)

## 2017-02-03 NOTE — DISCHARGE INSTRUCTIONS
Behavioral Discharge Planning and Instructions    Summary: Admitted to hospital with suicidal ideation    Main Diagnosis: Major Depressive Disorder; Panic Disorder without agoraphobia     Major Treatments, Procedures and Findings: Psychiatric assessment; Neurology consult    Symptoms to Report: Mood getting worse or Thoughts of suicide    Lifestyle Adjustment: Develop and follow safety plan. Recommendation to re-engage in therapy to look at conflicts about pain , purpose and finding pleasure in life.    Psychiatry Follow-up:     You have a follow up psychiatry appointment with Freddie COLE at [facility] on Thursday February 16, 2017 at 9:00 am. Please arrive at 8:30 am to complete paperwork.     [facility]  [address], [address]  [place]  [address]  Phone:  [phone] / Fax:  [phone]    You have a therapy appointment scheduled with Mejia Cowart PsyD, LP at [facility] on Tuesday February 7, 2017 at 11:00 am. Please complete intake forms and bring them with you to this appointment.     [facility]  [address]  [place]  [address]  Phone:  [phone] / Fax:  [phone]    Primary care: Dr. Wendy Bergeron [facility]  [address]  . [place] [id number]  [phone]    Resources:   Crisis Intervention: 312.954.2725 or 750-682-7069 (TTY: 384.930.1603).  Call anytime for help.  National [place] on Mental Illness (www.mn.wilber.org): 293.892.1682 or 740-894-5658.  National Suicide Prevention Line (www.mentalhealthmn.org): 640-500-UDIW (7547)    General Medication Instructions:   See your medication sheet(s) for instructions.   Take all medicines as directed.  Make no changes unless your doctor suggests them.   Go to all your doctor visits.  Be sure to have all your required lab tests. This way, your medicines can be refilled on time.  Do not use any drugs not prescribed by  your doctor.  Avoid alcohol.

## 2017-02-03 NOTE — PROGRESS NOTES
See dictation. HAs are multifactorial.  Analgesic rebound- needs to dramatically reduce all analgesic use, neck related, depression related.  Rec:1. Taper analgesics 2. PT for neck 3. Start low dose amitriptyline as preventative and for sleep- if ok with psychiatry  I will order sed rate- please make sure not elevated prior to discharge

## 2017-02-03 NOTE — PROGRESS NOTES
Pt's son Joaquín Boucher (#[phone]) would like a call from the physician, WILIAM has been signed.  He believes that his mother is doing much better than when he brought her in and does not necessarily think she needs to be inpatient any longer.  He thinks that she may benefit from D/Cing by Saturday to be able to attend and family gathering, but was also concerned that it may be too much for her.  The son was also frustrated with the uncertainty of the process, he seemed to want clear answers as to what the plan is for his mother.  He was frustrated that the Neurology consult had not been completed, explained that they had 24 hours to complete this.

## 2017-02-03 NOTE — PLAN OF CARE
Problem: Depressive Symptoms  Goal: Depressive Symptoms  1. Mood stability   2. Absence of suicidal ideation/contracts for safety   3. Depressive s/sx resolved  4. Safety plan in place  5. Positive coping skills identified/utilized  6. Medication regiment established/compliance  7. Adequate sleep  8. Housing community support  9. F/u plan in place   Outcome: Improving   Pt presents with a calm affect and withdrawn. Appears to be less anxious than previous days. Pt spent over half of shift in room bed resting.  During one-on-one pt stated she still has a headache today and was wondering whether her headaches cause her to be stressed or being stressed causes her headache. Seen by neurology this afternoon, results forth coming. Will be moving to SDU when bed is available.

## 2017-02-03 NOTE — PLAN OF CARE
Problem: Depressive Symptoms  Goal: Depressive Symptoms  1. Mood stability   2. Absence of suicidal ideation/contracts for safety   3. Depressive s/sx resolved  4. Safety plan in place  5. Positive coping skills identified/utilized  6. Medication regiment established/compliance  7. Adequate sleep  8. Housing community support  9. F/u plan in place   Outcome: Improving  Pt presents with a calm affect and withdrawn. Appears to be less anxious than previous days. Pt spent the half of shift in room bedresting, but was visible towards the evening; enjoyed watching a movie with other patients. Pt had many visitors come by and pt appeared happy to see them. During one-on-one pt stated she still has a headache today and was pondering whether her headaches cause her to be stressed or being stressed causes her headache. Brightens upon approach.

## 2017-02-03 NOTE — CONSULTS
NEUROLOGY  CONSULTATION      DATE OF CONSULTATION:  02/03/2017      REQUESTING PHYSICIAN:  Doroteo Dutton MD.      REASON FOR CONSULTATION:  Headaches,      Ms. Debbie Boucher is a pleasant 71-year-old woman I am asked to see regarding headaches.  She informs me she has been headache prone for many, many years.  She had what she described as migraine type headaches.  She did not have any visual aura but would develop severe headaches associated with nausea, light and sound sensitivity.  These headaches were primarily bitemporal.  She has had other types of headaches as well.  Over the last couple of months, she describes headaches that tend to move about.  Again, there is no warning or visual aura.  Sometimes there may be slight nausea and sometimes mild light sensitivity.  She has a headache pretty much continuously.  It became more significant over the last 2-3 months.  Shortly before this happened.  She does describe fall where she did strike the front of her head.  She is aware of some constant tightness in her neck region.  Also, depression became worse over this 2-3 month period,  ultimately leading to her hospitalization here with suicidal ideation.  There was no medication change to account for her headaches.  Over the last few months she is taking Tylenol and she takes 2 pills every 4 hours during the day.      CURRENT MEDICATIONS:     1.  Levothyroxine   2.  Mirtazapine 7.5 mg.   3.  Trazodone 100 mg.    4.  Effexor 100 mg.      REVIEW OF SYSTEMS:  She is not aware of any family history of headaches.      No visual symptoms at this time.  No fevers or night sweats or systemic symptoms.  She does report that her mood is improving.  No focal neurological symptoms in association with her headaches such as language change, numbness, tingling or focal weakness.  She reports she has always been prone towards some reduced balance.      PHYSICAL EXAMINATION:   VITAL SIGNS:  Blood pressure 139/75, temperature  98.3.  Pulse 60.  Respirations 15.     GENERAL:  She is alert and oriented.  Language function is intact.     NEUROLOGIC:  Extraocular movements are full, visual fields are grossly intact.  Pupils are equal and reactive.  Face, tongue and palate movement are symmetric.  She does have some restriction of cervical extension secondary to pain.  She generates normal power in her upper and lower extremities.  Sensation is intact to light touch and pinprick.  Deep tendon reflexes reveal diminished biceps and brachioradialis reflexes in her left upper extremity compared to the right.  Lower extremity deep tendon reflexes are normal and symmetric.  No Babinski signs elicited.  Finger-to-nose testing is normal.  Her gait is steady, but she does have mild difficulty with tandem gait.      CT scan of the head was performed and reveals age-related changes.  Chemistry panel and CBC have been unremarkable.      TSH is normal.      IMPRESSION:  I believe her headaches are multifactorial.  Part of the problem clearly relates to analgesic rebound headache.  I discussed what this is.  She needs to markedly reduced her analgesic use.  This is true of Tylenol, aspirin, ibuprofen, Fioricet, or whatever else is used to manage her headaches.  Typically we consider use 4 times a week or more as excessive.  To help accomplish this, she will likely need some other help.  I do think part of her headaches are cervicogenic.  She has some neck stiffness.  She has asymmetric deep tendon reflexes in her upper extremities which would also be consistent with cervical problems.  I would recommend starting with a course of physical therapy.  Finally, she may need a preventative medication.  For this type of headache pattern, often a low dose of a tricyclic antidepressant works best.  She has had problems with sleep.  I would recommend a trial of amitriptyline starting at 10 mg, but increased as needed/tolerated.  This could replace her trazodone.       If there is no help with these strategies, I would strongly consider the notion of an MRI of her cervical spine.  I will order a sedimentation rate just to make sure there is no obvious indication for a condition like temporal arteritis  I would appreciate if the Psychiatry Service could check those results prior to her discharge and contact us if there are any concerns.  I could follow up with her on an outpatient basis, if needed.         FADIA CARPIO MD             D: 2017 14:36   T: 2017 15:27   MT: MJ      Name:     RAMYA CM   MRN:      [id number]        Account:       [id number]   :      1945           Consult Date:  2017      Document: [id number]       cc: Doroteo Dutton MD

## 2017-02-03 NOTE — PROGRESS NOTES
[facility] Psychiatric Progress Note      Interval History:   Pt seen, team meeting held with , nursing staff, OT, and PA's to review diagnosis and treatment plan.  Patient reports that she slept very well last night.  She denies medication side effects.  She states her headache is also better with the Fiorinal she was started on.  She states she was delighted to be seen by a neurologist and was advised that her consult is pending.  She states she will like to discharge from the hospital and she was advised that her son had requested for her to be discharged tomorrow so she could participate in a family gathering.  She denies current self-harm thoughts or plans.  She is future oriented and is tolerating medications without any side effects.     Review of systems:   The Review of Systems is negative other than noted in the HPI     Medications:       mirtazapine  7.5 mg Oral At Bedtime     levothyroxine (SYNTHROID/LEVOTHROID) tablet 100 mcg  100 mcg Oral Daily     traZODone (DESYREL) tablet 100 mg  100 mg Oral At Bedtime     venlafaxine (EFFEXOR) tablet 100 mg  100 mg Oral BID     butalbital-aspirin-caffeine, hydrOXYzine, rizatriptan (MAXALT) tablet 10 mg, acetaminophen    Mental Status Examination:     Appearance:  awake, alert, adequately groomed and casually dressed  Eye Contact:  better  Speech:  clear, coherent  Psychomotor Behavior:  no evidence of tardive dyskinesia, dystonia, or tics and fidgeting  Mood:  better   Affect:  appropriate and in normal range and intensity is normal  Thought Process:  logical, linear and goal oriented no loose associations  Thought Content:  no evidence of suicidal ideation or homicidal ideation   Oriented to:  time, person, and place  Attention Span and Concentration:  limited  Recent and Remote Memory:  limited  Fund of Knowledge: appropriate  Muscle Strength and Tone: normal  Gait and Station: Normal  Insight:  fair  Judgment:  limited           Labs/Vitals:   No results found for this or any previous visit (from the past 24 hour(s)).  B/P: 139/75, T: 98.3, P: 58, R: 15    Impression:   Debbie Boucher is a 71-year-old lady with known history of anxiety and depression who presents to the hospital for evaluation on account of worsening symptoms in the context of going off her long-term antidepressant medications a couple days ago because she could not get a refill from her primary care physician.  She reports florid symptoms of depression as well as persistent migraine type headaches that have been going on for more than 3 months without relief.        DIagnoses:   1.  Major depressive disorder, recurrent, severe without psychosis.    2.  Panic disorder without agoraphobia.    3.  Migraines.    4.  Hypothyroidism.        Plan:   1. Written information given on medications. Side effects, risks, benefits reviewed.  2.  Maintain current medications.  3.  Await neurology consult.  4.  Continue hospitalization.    Attestation:  Patient has been seen and evaluated by Doroteo arias MD    PATIENT ID  Name: Debbie Boucher  MRN:[id number]  YOB: 1945

## 2017-02-04 VITALS
SYSTOLIC BLOOD PRESSURE: 143 MMHG | TEMPERATURE: 97.9 F | HEART RATE: 58 BPM | WEIGHT: 119.4 LBS | RESPIRATION RATE: 16 BRPM | OXYGEN SATURATION: 99 % | DIASTOLIC BLOOD PRESSURE: 63 MMHG

## 2017-02-04 PROCEDURE — A9270 NON-COVERED ITEM OR SERVICE: HCPCS | Mod: GY | Performed by: PSYCHIATRY & NEUROLOGY

## 2017-02-04 PROCEDURE — 90853 GROUP PSYCHOTHERAPY: CPT

## 2017-02-04 PROCEDURE — 25000132 ZZH RX MED GY IP 250 OP 250 PS 637: Mod: GY | Performed by: PSYCHIATRY & NEUROLOGY

## 2017-02-04 PROCEDURE — A9270 NON-COVERED ITEM OR SERVICE: HCPCS | Mod: GY | Performed by: PHYSICIAN ASSISTANT

## 2017-02-04 PROCEDURE — 25000132 ZZH RX MED GY IP 250 OP 250 PS 637: Mod: GY | Performed by: PHYSICIAN ASSISTANT

## 2017-02-04 RX ORDER — AMITRIPTYLINE HYDROCHLORIDE 10 MG/1
TABLET ORAL
Qty: 90 TABLET | Refills: 0 | Status: SHIPPED
Start: 2017-02-04 | End: 2023-04-04

## 2017-02-04 RX ADMIN — VENLAFAXINE 100 MG: 100 TABLET ORAL at 07:54

## 2017-02-04 RX ADMIN — LEVOTHYROXINE SODIUM 100 MCG: 100 TABLET ORAL at 07:54

## 2017-02-04 RX ADMIN — BUTALBITAL, ASPIRIN, AND CAFFEINE 1 CAPSULE: 50; 325; 40 CAPSULE ORAL at 07:54

## 2017-02-04 NOTE — PLAN OF CARE
Problem: Depressive Symptoms  Goal: Depressive Symptoms  1. Mood stability   2. Absence of suicidal ideation/contracts for safety   3. Depressive s/sx resolved  4. Safety plan in place  5. Positive coping skills identified/utilized  6. Medication regiment established/compliance  7. Adequate sleep  8. Housing community support  9. F/u plan in place   Outcome: No Change  Pt moved to SDU and has adjusted well. Presents with a blunt/calm affect and withdrawn. Brightens upon approach and is pleasant and cooperative with staff. Spent most of the shift in room bedresting or talking to visitors; came out to eat dinner in lounge. After visitors left pt quickly returned to room. Pt did not go to OT or wrap-up group. Possible d/c tomorrow.

## 2017-02-04 NOTE — PLAN OF CARE
Problem: General Rehab Plan of Care  Goal: Occupational Therapy Goals  The patient and/or their representative will achieve their patient-specific goals related to the plan of care.  The patient-specific goals include:  Pt has not yet attended any OT groups during this hospitalization.

## 2017-02-11 NOTE — DISCHARGE SUMMARY
"DATE OF ADMISSION:  2017      DATE OF DISCHARGE:  2017      DISCHARGE DIAGNOSES:   1.  Major depressive disorder, recurrent, severe without psychosis.   2.  Panic disorder without agoraphobia.   3.  Migraines.   4.  Hypothyroidism.        REASON FOR REFERRAL:  Debbie Boucher is a 71-year-old woman who is  and has 2 children.  She is a retired  who presented to the ED at [facility] on account of depression and suicidal ideation.  She was admitted on a 72-hour hold that  on 2017 at 6:34 p.m.      CHIEF COMPLAINT:  \"My depression has just gotten bad.\"      HISTORY OF PRESENT ILLNESS:  I refer the reader to the psychiatric evaluation completed on 2017 by Doroteo Dutton MD, in addition to the history and physical examination documented by Jama Cesar PA-C, and attested by Jose Latham MD, on 2017.  I also refer to the Neurology consultation documented by Clifford Francisco MD, on 2017.      HOSPITAL COURSE:  Following admission to the mental health unit, the patient was introduced to the milieu and encouraged to participate in individual milieu and group therapy.  She was given the opportunity to ventilate her stressors.  She spoke about the impact of her migraine headaches on her mood and the fact that she felt she had a right to end her own life on her own terms if she felt her migraines were becoming unbearable.  She described severe impairment on account of her headaches.  Consequently, she was encouraged to commence mirtazapine at 7.5 mg at bedtime while continuing venlafaxine at 100 mg b.i.d.  A Neurology consult was requested and she was seen by Clifford Francisco MD, on 2017.  Dr. Francisco believed her headaches were multifactorial in origin and felt that part of her problem was an analgesic rebound headache.  He felt she needed to markedly reduce her analgesic use, as she had been taking Tylenol, aspirin, " ibuprofen and now Fioricet.  He considered her use of acetaminophen to be excessive and so he recommended a trial of amitriptyline started at 10 mg daily but increase as needed/tolerated.  He also felt that this could replace her currently prescribed trazodone.  He suggested that she follow up with Neurology and offered to see her as an outpatient.  He felt that if her symptoms did not improve that an MRI of her cervical spine be considered.  He did order a sedimentation rate to rule out temporal arteritis and this came back normal at 10 mm per hour.  She tolerated amitriptyline, and mirtazapine was discontinued.  The patient soon began to report improvement in her mood and function.  Her  son visited her on the unit and was able to vouch for her safety.  At the time of her discharge, the patient was reporting absence of self-harm thoughts, plans or intent and was very future oriented.  She felt that her concerns had been addressed and she felt comfortable following up with Neurology as recommended.  She was thus discharged from the hospital on 02/04/2017.      DISCHARGE MEDICATIONS:   1.  Elavil 10 mg p.o. q.h.s. for 3 days, then 2 tablets p.o. q.h.s. for 3 days with a plan to increase her dose to 50-75 mg at bedtime after 1 month.     2.  Hydroxyzine was continued at 25-50 mg q.6 hours p.r.n. anxiety.   3.  Levothyroxine sodium 100 mcg p.o. daily.   4.  Venlafaxine  mg p.o. b.i.d.    5.  Maxalt 10 mg p.o. p.r.n. migraine.   6.  Multivitamin 3 tablets p.o. b.i.d.   7.  Trazodone 100 mg p.o. q.h.s.       DISPOSITION:  The patient was discharged to follow up with FINESSE Maravilla at [facility] in [place] on Thursday, 02/16/2017 at 9:00 a.m.  She is also to see Mejia Cowart PsyD, LP, at [facility] in [place] on Tuesday, 02/07/2017 at 11:00 a.m.  She is to follow up with her primary care physician, Arlin Young MD, at [facility].      TIME SPENT:  40 minutes with more  than 50% of the time spent in counseling and care coordination.         YVONNE JOHNS MD             D: 02/10/2017 17:55   T: 02/10/2017 21:27   MT: VIRGILIO      Name:     RAMYA CM   MRN:      [id number]        Account:        [id number]   :      1945           Admit Date:                                       Discharge Date: 2017      Document: [id number]

## 2023-04-04 ENCOUNTER — APPOINTMENT (OUTPATIENT)
Dept: CT IMAGING | Facility: CLINIC | Age: 78
DRG: 543 | End: 2023-04-04
Attending: PHYSICIAN ASSISTANT
Payer: MEDICARE

## 2023-04-04 ENCOUNTER — HOSPITAL ENCOUNTER (INPATIENT)
Facility: CLINIC | Age: 78
LOS: 3 days | Discharge: NURSING, REHAB OR LONG-TERM CARE | DRG: 543 | End: 2023-04-07
Attending: PHYSICIAN ASSISTANT | Admitting: INTERNAL MEDICINE
Payer: MEDICARE

## 2023-04-04 DIAGNOSIS — K83.8 COMMON BILE DUCT DILATATION: ICD-10-CM
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
## 2023-04-04 DIAGNOSIS — S22.081A BURST FRACTURE OF T12 VERTEBRA (H): ICD-10-CM

## 2023-04-04 LAB
ABO/RH(D): NORMAL
ALBUMIN SERPL BCG-MCNC: 4.2 G/DL (ref 3.5–5.2)
ALP SERPL-CCNC: 81 U/L (ref 35–104)
ALT SERPL W P-5'-P-CCNC: 21 U/L (ref 10–35)
ANION GAP SERPL CALCULATED.3IONS-SCNC: 9 MMOL/L (ref 7–15)
ANTIBODY SCREEN: NEGATIVE
AST SERPL W P-5'-P-CCNC: 29 U/L (ref 10–35)
BASOPHILS # BLD AUTO: 0 10E3/UL (ref 0–0.2)
BASOPHILS NFR BLD AUTO: 0 %
BILIRUB DIRECT SERPL-MCNC: <0.2 MG/DL (ref 0–0.3)
BILIRUB SERPL-MCNC: 0.2 MG/DL
BUN SERPL-MCNC: 18.4 MG/DL (ref 8–23)
CALCIUM SERPL-MCNC: 9.5 MG/DL (ref 8.8–10.2)
CHLORIDE SERPL-SCNC: 105 MMOL/L (ref 98–107)
CREAT SERPL-MCNC: 0.74 MG/DL (ref 0.51–0.95)
DEPRECATED HCO3 PLAS-SCNC: 27 MMOL/L (ref 22–29)
EOSINOPHIL # BLD AUTO: 0 10E3/UL (ref 0–0.7)
EOSINOPHIL NFR BLD AUTO: 1 %
ERYTHROCYTE [DISTWIDTH] IN BLOOD BY AUTOMATED COUNT: 11.3 % (ref 10–15)
GFR SERPL CREATININE-BSD FRML MDRD: 83 ML/MIN/1.73M2
GLUCOSE SERPL-MCNC: 111 MG/DL (ref 70–99)
HCT VFR BLD AUTO: 36.3 % (ref 35–47)
HGB BLD-MCNC: 12.3 G/DL (ref 11.7–15.7)
IMM GRANULOCYTES # BLD: 0.1 10E3/UL
IMM GRANULOCYTES NFR BLD: 1 %
LYMPHOCYTES # BLD AUTO: 0.8 10E3/UL (ref 0.8–5.3)
LYMPHOCYTES NFR BLD AUTO: 12 %
MCH RBC QN AUTO: 34.6 PG (ref 26.5–33)
MCHC RBC AUTO-ENTMCNC: 33.9 G/DL (ref 31.5–36.5)
MCV RBC AUTO: 102 FL (ref 78–100)
MONOCYTES # BLD AUTO: 0.5 10E3/UL (ref 0–1.3)
MONOCYTES NFR BLD AUTO: 7 %
NEUTROPHILS # BLD AUTO: 5.1 10E3/UL (ref 1.6–8.3)
NEUTROPHILS NFR BLD AUTO: 79 %
NRBC # BLD AUTO: 0 10E3/UL
NRBC BLD AUTO-RTO: 0 /100
PLATELET # BLD AUTO: 225 10E3/UL (ref 150–450)
POTASSIUM SERPL-SCNC: 4.8 MMOL/L (ref 3.4–5.3)
PROT SERPL-MCNC: 6.7 G/DL (ref 6.4–8.3)
RBC # BLD AUTO: 3.55 10E6/UL (ref 3.8–5.2)
SODIUM SERPL-SCNC: 141 MMOL/L (ref 136–145)
SPECIMEN EXPIRATION DATE: NORMAL
WBC # BLD AUTO: 6.5 10E3/UL (ref 4–11)

## 2023-04-04 PROCEDURE — 80053 COMPREHEN METABOLIC PANEL: CPT | Performed by: PHYSICIAN ASSISTANT

## 2023-04-04 PROCEDURE — 120N000001 HC R&B MED SURG/OB

## 2023-04-04 PROCEDURE — 72131 CT LUMBAR SPINE W/O DYE: CPT | Mod: MA

## 2023-04-04 PROCEDURE — 250N000013 HC RX MED GY IP 250 OP 250 PS 637: Performed by: EMERGENCY MEDICINE

## 2023-04-04 PROCEDURE — 250N000013 HC RX MED GY IP 250 OP 250 PS 637: Performed by: PHYSICIAN ASSISTANT

## 2023-04-04 PROCEDURE — 96374 THER/PROPH/DIAG INJ IV PUSH: CPT

## 2023-04-04 PROCEDURE — 250N000011 HC RX IP 250 OP 636: Performed by: PHYSICIAN ASSISTANT

## 2023-04-04 PROCEDURE — 36415 COLL VENOUS BLD VENIPUNCTURE: CPT | Performed by: PHYSICIAN ASSISTANT

## 2023-04-04 PROCEDURE — 86850 RBC ANTIBODY SCREEN: CPT | Performed by: PHYSICIAN ASSISTANT

## 2023-04-04 PROCEDURE — 85025 COMPLETE CBC W/AUTO DIFF WBC: CPT | Performed by: PHYSICIAN ASSISTANT

## 2023-04-04 PROCEDURE — 72128 CT CHEST SPINE W/O DYE: CPT | Mod: MA

## 2023-04-04 PROCEDURE — 99285 EMERGENCY DEPT VISIT HI MDM: CPT | Mod: 25

## 2023-04-04 PROCEDURE — 72192 CT PELVIS W/O DYE: CPT | Mod: MA

## 2023-04-04 PROCEDURE — 96376 TX/PRO/DX INJ SAME DRUG ADON: CPT

## 2023-04-04 PROCEDURE — 250N000011 HC RX IP 250 OP 636: Performed by: EMERGENCY MEDICINE

## 2023-04-04 PROCEDURE — 96375 TX/PRO/DX INJ NEW DRUG ADDON: CPT

## 2023-04-04 PROCEDURE — 82306 VITAMIN D 25 HYDROXY: CPT | Performed by: PHYSICIAN ASSISTANT

## 2023-04-04 PROCEDURE — 99223 1ST HOSP IP/OBS HIGH 75: CPT | Mod: AI | Performed by: PHYSICIAN ASSISTANT

## 2023-04-04 PROCEDURE — 71250 CT THORAX DX C-: CPT | Mod: MA

## 2023-04-04 PROCEDURE — 82248 BILIRUBIN DIRECT: CPT | Performed by: EMERGENCY MEDICINE

## 2023-04-04 RX ORDER — TRAZODONE HYDROCHLORIDE 100 MG/1
100 TABLET ORAL
Status: DISCONTINUED | OUTPATIENT
Start: 2023-04-04 | End: 2023-04-07 | Stop reason: HOSPADM

## 2023-04-04 RX ORDER — CALCIUM CARBONATE 500 MG/1
1000 TABLET, CHEWABLE ORAL 4 TIMES DAILY PRN
Status: DISCONTINUED | OUTPATIENT
Start: 2023-04-04 | End: 2023-04-07 | Stop reason: HOSPADM

## 2023-04-04 RX ORDER — OMEPRAZOLE 40 MG/1
40 CAPSULE, DELAYED RELEASE ORAL DAILY
COMMUNITY

## 2023-04-04 RX ORDER — HYDROXYZINE HYDROCHLORIDE 25 MG/1
50 TABLET, FILM COATED ORAL EVERY 6 HOURS PRN
Status: DISCONTINUED | OUTPATIENT
Start: 2023-04-04 | End: 2023-04-07 | Stop reason: HOSPADM

## 2023-04-04 RX ORDER — ONDANSETRON 2 MG/ML
4 INJECTION INTRAMUSCULAR; INTRAVENOUS EVERY 6 HOURS PRN
Status: DISCONTINUED | OUTPATIENT
Start: 2023-04-04 | End: 2023-04-07 | Stop reason: HOSPADM

## 2023-04-04 RX ORDER — ONDANSETRON 2 MG/ML
4 INJECTION INTRAMUSCULAR; INTRAVENOUS ONCE
Status: COMPLETED | OUTPATIENT
Start: 2023-04-04 | End: 2023-04-04

## 2023-04-04 RX ORDER — LIDOCAINE 40 MG/G
CREAM TOPICAL
Status: DISCONTINUED | OUTPATIENT
Start: 2023-04-04 | End: 2023-04-07 | Stop reason: HOSPADM

## 2023-04-04 RX ORDER — KETOROLAC TROMETHAMINE 15 MG/ML
15 INJECTION, SOLUTION INTRAMUSCULAR; INTRAVENOUS ONCE
Status: COMPLETED | OUTPATIENT
Start: 2023-04-04 | End: 2023-04-04

## 2023-04-04 RX ORDER — LISINOPRIL 20 MG/1
20 TABLET ORAL DAILY
COMMUNITY
End: 2023-04-20

## 2023-04-04 RX ORDER — HYDROMORPHONE HYDROCHLORIDE 1 MG/ML
0.5 INJECTION, SOLUTION INTRAMUSCULAR; INTRAVENOUS; SUBCUTANEOUS ONCE
Status: COMPLETED | OUTPATIENT
Start: 2023-04-04 | End: 2023-04-04

## 2023-04-04 RX ORDER — AMOXICILLIN 250 MG
1 CAPSULE ORAL 2 TIMES DAILY PRN
Status: DISCONTINUED | OUTPATIENT
Start: 2023-04-04 | End: 2023-04-07 | Stop reason: HOSPADM

## 2023-04-04 RX ORDER — MULTIPLE VITAMINS W/ MINERALS TAB 9MG-400MCG
3 TAB ORAL
Status: DISCONTINUED | OUTPATIENT
Start: 2023-04-05 | End: 2023-04-07

## 2023-04-04 RX ORDER — LISINOPRIL 20 MG/1
20 TABLET ORAL DAILY
Status: DISCONTINUED | OUTPATIENT
Start: 2023-04-05 | End: 2023-04-07 | Stop reason: HOSPADM

## 2023-04-04 RX ORDER — RIZATRIPTAN BENZOATE 10 MG/1
10 TABLET, ORALLY DISINTEGRATING ORAL
Status: DISCONTINUED | OUTPATIENT
Start: 2023-04-04 | End: 2023-04-07 | Stop reason: HOSPADM

## 2023-04-04 RX ORDER — ACETAMINOPHEN 500 MG
1000 TABLET ORAL ONCE
Status: COMPLETED | OUTPATIENT
Start: 2023-04-04 | End: 2023-04-04

## 2023-04-04 RX ORDER — POLYETHYLENE GLYCOL 3350 17 G/17G
17 POWDER, FOR SOLUTION ORAL DAILY
Status: DISCONTINUED | OUTPATIENT
Start: 2023-04-05 | End: 2023-04-07 | Stop reason: HOSPADM

## 2023-04-04 RX ORDER — AMOXICILLIN 250 MG
2 CAPSULE ORAL 2 TIMES DAILY PRN
Status: DISCONTINUED | OUTPATIENT
Start: 2023-04-04 | End: 2023-04-07 | Stop reason: HOSPADM

## 2023-04-04 RX ORDER — KETOROLAC TROMETHAMINE 15 MG/ML
15 INJECTION, SOLUTION INTRAMUSCULAR; INTRAVENOUS EVERY 6 HOURS PRN
Status: DISCONTINUED | OUTPATIENT
Start: 2023-04-04 | End: 2023-04-07 | Stop reason: HOSPADM

## 2023-04-04 RX ORDER — HYDROMORPHONE HCL IN WATER/PF 6 MG/30 ML
0.2 PATIENT CONTROLLED ANALGESIA SYRINGE INTRAVENOUS
Status: DISCONTINUED | OUTPATIENT
Start: 2023-04-04 | End: 2023-04-07 | Stop reason: HOSPADM

## 2023-04-04 RX ORDER — SODIUM CHLORIDE, SODIUM LACTATE, POTASSIUM CHLORIDE, CALCIUM CHLORIDE 600; 310; 30; 20 MG/100ML; MG/100ML; MG/100ML; MG/100ML
INJECTION, SOLUTION INTRAVENOUS CONTINUOUS
Status: DISCONTINUED | OUTPATIENT
Start: 2023-04-04 | End: 2023-04-04

## 2023-04-04 RX ORDER — ENOXAPARIN SODIUM 100 MG/ML
30 INJECTION SUBCUTANEOUS EVERY 24 HOURS
Status: DISCONTINUED | OUTPATIENT
Start: 2023-04-04 | End: 2023-04-07 | Stop reason: HOSPADM

## 2023-04-04 RX ORDER — HYDROXYZINE HYDROCHLORIDE 25 MG/1
25 TABLET, FILM COATED ORAL EVERY 6 HOURS PRN
Status: DISCONTINUED | OUTPATIENT
Start: 2023-04-04 | End: 2023-04-07 | Stop reason: HOSPADM

## 2023-04-04 RX ORDER — BUPROPION HYDROCHLORIDE 150 MG/1
150 TABLET ORAL EVERY MORNING
Status: DISCONTINUED | OUTPATIENT
Start: 2023-04-05 | End: 2023-04-07 | Stop reason: HOSPADM

## 2023-04-04 RX ORDER — FLUOXETINE 40 MG/1
40 CAPSULE ORAL DAILY
COMMUNITY

## 2023-04-04 RX ORDER — ONDANSETRON 4 MG/1
4 TABLET, ORALLY DISINTEGRATING ORAL EVERY 6 HOURS PRN
Status: DISCONTINUED | OUTPATIENT
Start: 2023-04-04 | End: 2023-04-07 | Stop reason: HOSPADM

## 2023-04-04 RX ORDER — HYDROMORPHONE HYDROCHLORIDE 1 MG/ML
0.5 INJECTION, SOLUTION INTRAMUSCULAR; INTRAVENOUS; SUBCUTANEOUS
Status: COMPLETED | OUTPATIENT
Start: 2023-04-04 | End: 2023-04-04

## 2023-04-04 RX ORDER — LEVOTHYROXINE SODIUM 75 UG/1
75 TABLET ORAL DAILY
Status: DISCONTINUED | OUTPATIENT
Start: 2023-04-05 | End: 2023-04-07 | Stop reason: HOSPADM

## 2023-04-04 RX ORDER — BUPROPION HYDROCHLORIDE 150 MG/1
150 TABLET ORAL EVERY MORNING
COMMUNITY

## 2023-04-04 RX ORDER — OXYCODONE HYDROCHLORIDE 5 MG/1
5 TABLET ORAL EVERY 4 HOURS PRN
Status: DISCONTINUED | OUTPATIENT
Start: 2023-04-04 | End: 2023-04-07 | Stop reason: HOSPADM

## 2023-04-04 RX ORDER — HYDROMORPHONE HCL IN WATER/PF 6 MG/30 ML
0.4 PATIENT CONTROLLED ANALGESIA SYRINGE INTRAVENOUS
Status: DISCONTINUED | OUTPATIENT
Start: 2023-04-04 | End: 2023-04-07 | Stop reason: HOSPADM

## 2023-04-04 RX ORDER — ACETAMINOPHEN 325 MG/1
975 TABLET ORAL EVERY 8 HOURS
Status: DISCONTINUED | OUTPATIENT
Start: 2023-04-04 | End: 2023-04-07 | Stop reason: HOSPADM

## 2023-04-04 RX ADMIN — ENOXAPARIN SODIUM 30 MG: 30 INJECTION SUBCUTANEOUS at 22:29

## 2023-04-04 RX ADMIN — TRAZODONE HYDROCHLORIDE 100 MG: 100 TABLET ORAL at 22:37

## 2023-04-04 RX ADMIN — ONDANSETRON 4 MG: 2 INJECTION INTRAMUSCULAR; INTRAVENOUS at 16:34

## 2023-04-04 RX ADMIN — HYDROMORPHONE HYDROCHLORIDE 0.5 MG: 1 INJECTION, SOLUTION INTRAMUSCULAR; INTRAVENOUS; SUBCUTANEOUS at 17:06

## 2023-04-04 RX ADMIN — HYDROMORPHONE HYDROCHLORIDE 0.5 MG: 1 INJECTION, SOLUTION INTRAMUSCULAR; INTRAVENOUS; SUBCUTANEOUS at 16:34

## 2023-04-04 RX ADMIN — ACETAMINOPHEN 1000 MG: 500 TABLET ORAL at 19:51

## 2023-04-04 RX ADMIN — KETOROLAC TROMETHAMINE 15 MG: 15 INJECTION, SOLUTION INTRAMUSCULAR; INTRAVENOUS at 19:51

## 2023-04-04 ASSESSMENT — ACTIVITIES OF DAILY LIVING (ADL)
ADLS_ACUITY_SCORE: 35
ADLS_ACUITY_SCORE: 35
CONCENTRATING,_REMEMBERING_OR_MAKING_DECISIONS_DIFFICULTY: NO
NUMBER_OF_TIMES_PATIENT_HAS_FALLEN_WITHIN_LAST_SIX_MONTHS: 1
DRESS: 2-->COMPLETELY DEPENDENT
CHANGE_IN_FUNCTIONAL_STATUS_SINCE_ONSET_OF_CURRENT_ILLNESS/INJURY: YES
BATHING: 2-->COMPLETELY DEPENDENT (NOT DEVELOPMENTALLY APPROPRIATE)
DRESSING/BATHING_DIFFICULTY: NO
ADLS_ACUITY_SCORE: 34
DOING_ERRANDS_INDEPENDENTLY_DIFFICULTY: NO
WALKING_OR_CLIMBING_STAIRS_DIFFICULTY: NO
DRESS: 2-->COMPLETELY DEPENDENT (NOT DEVELOPMENTALLY APPROPRIATE)
TOILETING_ISSUES: NO
FALL_HISTORY_WITHIN_LAST_SIX_MONTHS: YES
VISION_MANAGEMENT: GLASSES
ADLS_ACUITY_SCORE: 35
DIFFICULTY_EATING/SWALLOWING: NO
WEAR_GLASSES_OR_BLIND: YES

## 2023-04-04 ASSESSMENT — ENCOUNTER SYMPTOMS
HEADACHES: 0
NUMBNESS: 0
BACK PAIN: 1
NECK PAIN: 0
WEAKNESS: 0

## 2023-04-04 NOTE — ED NOTES
Bed: ED24  Expected date: 4/4/23  Expected time: 3:22 PM  Means of arrival: Ambulance  Comments:  [id number]

## 2023-04-04 NOTE — ED PROVIDER NOTES
Emergency Department Attending Supervision Note  4/4/2023  5:32 PM      I evaluated this patient in conjunction with Abhilash French PA-C      Briefly, the patient presented for evaluation after a ground level fall at home. She fell backwards onto her low back and bottom and presents with severe pain in her low back. No head injury, LOC, chest pain or shortness of breath.      On my exam, patient is resting comfortably.  CV:  RRR, no M/R/G  Resp:  CTAB, no increased work of breathing  Neuro:  Strength and sensation 5 out of 5 and intact in the bilateral lower extremities.  No ankle clonus.  Abd:  Soft nontender nondistended.  Multiple healed surgical scars.    Results:  All ED results were personally reviewed by myself.  CT of the thoracic spine reveals a T12 burst fracture with retropulsion. No significant canal narrowing.  No other acute acute traumatic injuries identified in the chest or abdomen.  Incidentally noted significantly dilated common bile duct unrelated to her current presentation.  This will need follow-up.  LFTs and bilirubin are normal.      ED course:  Patient was seen and evaluated with Abhilash. Analgesics were provided.  Pain was difficult to control.  Patient will require admission to the hospital for ongoing monitoring evaluation and treatment.  No neurovascular compromise.  No evidence of head injury.  Remainder of ED evaluation is otherwise reassuring.    ED Course as of 04/1945 Tue Apr 04, 2023 1911 CT thoracic spine with acute burst type fracture of T12 with retropulsion.  No significant canal narrowing.  Neurosurgery paged.   1935 The patient's presentation was discussed with Brian Sena PA-C, neurosurgery. Patient okay to be admitted at [facility].  Bedrest for now until TLSO brace is fitted.   1937 I obtained history and examined the patient as noted above.   1945 I spoke with Maribell Salcido PA-C accepting for hospitalist team.         My impression is 1. Acute T12 Burst fracture 2.  Acute fracture related pain    Diagnosis    ICD-10-CM    1. Fall, initial encounter  W19.XXXA       2. Burst fracture of T12 vertebra (H)  S22.081A       3. Common bile duct dilatation  K83.8           Disposition:  Admitted to the hospital under the care of Edward Bowen MD  04/04/23 1947

## 2023-04-04 NOTE — ED PROVIDER NOTES
History     Chief Complaint:  Fall    The history is provided by the patient.      Debbie Boucher is a 77 year old female with a history of obesity, lumbago, osteoporosis, and hyperlipidemia who presents following a fall. The patient reports loosing her balance while tending to her cat, and fell backward onto her back with immediate onset of pain. She was unable to get up on her own, and eventually crawled across the house to call for help. Daughter estimates she was down for about an hour. Patient denies hitting her head, neck, or any loss of consciousness. Patient felt fine prior to her fall. Denies chest pain, sob, or abdominal pain.  Patient received fentanyl en route to the ED via EMS. Patient denies any neck pain, headache, numbness, or weakness in arms or legs.     Independent Historian:   None    Review of External Notes: None     ROS:  Review of Systems   Musculoskeletal: Positive for back pain. Negative for neck pain.   Neurological: Negative for syncope, weakness, numbness and headaches.   All other systems reviewed and are negative.      Allergies:  Hydrochlorothiazide  Codeine  Contrast Dye  Diagnostic X-Ray Materials  Levofloxacin  Sulfa Drugs     Medications:    Venlafaxine  Trazodone  Maxalt  Levothyroxine  Atarax  Elavil     Past Medical History:    Anxiety  Depression  Migraines   Hypertension  TMJ  Leukopenia  Osteoporosis  GERD  Hyperlipidemia  Hypothyroidism  Obesity   Lumbago   Calculus of kidney     Past Surgical History:    Appendectomy  cholecystectomy  Tonsillectomy  Adenoidectomy  Bladder neck subsections  Hysterectomy  Hernia repair  Orthopedic surgery   Tubal ligation   Breast reduction      Family History:    Mother: diabetes, heart failure     Social History:     PCP: Clinic, [facility]  Presents with daughter and son-in-law.     Physical Exam     Patient Vitals for the past 24 hrs:   BP Temp Temp src Pulse Resp SpO2   04/04/23 1845 -- -- -- -- -- 100 %   04/04/23  1830 (!) 179/91 -- -- -- -- 100 %   04/04/23 1745 -- -- -- -- -- 94 %   04/04/23 1730 (!) 127/94 -- -- 74 -- 97 %   04/04/23 1715 -- -- -- -- -- 98 %   04/04/23 1714 -- -- -- -- -- 98 %   04/04/23 1713 -- -- -- -- -- 97 %   04/04/23 1712 -- -- -- -- -- 99 %   04/04/23 1711 -- -- -- -- -- 98 %   04/04/23 1710 -- -- -- -- -- 98 %   04/04/23 1708 (!) 187/102 -- -- 71 -- --   04/04/23 1645 -- -- -- -- -- 97 %   04/04/23 1630 -- -- -- -- -- 98 %   04/04/23 1615 -- -- -- -- -- 96 %   04/04/23 1600 (!) 184/93 -- -- 76 -- 97 %   04/04/23 1545 (!) 176/82 -- -- -- -- 99 %   04/04/23 1542 (!) 176/82 98  F (36.7  C) Temporal 76 17 97 %        Physical Exam  General: Uncomfortable appearing.  Lying on side on gurney.  Head:  Scalp is NC/AT without bruising, hematomas.  Eyes:  Globes normal and atraumatic.  PERRL, EOMI   ENT:  No bruising of facial bone ttp or step-offs.    TM's normal bilaterally    Oropharynx atraumatic.  Neck:  Normal range of motion without rigidity. Able to rotate 45 degrees BL. No bruising or swelling.  CV:  Regular rate and rhythm. No M/R/G.  Resp:  Breath sounds are clear bilaterally. Normal effort.  Abdomen: Abdomen is soft, no distension, no tenderness, no masses.  MS:  There is midline lumbar and sacral tenderness to multiple points without obvious step-off or deformity.  No midline cervical or thoracic tenderness.  Pelvis stable.  Able to perform straight leg raise at hips bilaterally.    No tenderness over sternum, scapula, ribs, clavicles.    PROM of all other major joints performed and unremarkable.  Skin:  Warm and dry, No bruising.  Extremities warm and well perfused w/normal cap refill, 2+ pulses.  Neuro: Alert and oriented.  Strength and sensation grossly intact in all 4 extremities.  Cranial nerves  2-12 intact. GCS: 15.  Psych:  Awake. Alert. Normal affect. Appropriate interactions.      Emergency Department Course     Imaging:  CT Thoracic Spine w/o Contrast   Final Result   IMPRESSION:   1.   Acute T12 burst fracture with mild height loss and minimal retropulsion.    2.  No high-grade canal stenosis or traumatic subluxation.      CT Chest Abodmen w/o Contrast   Final Result   IMPRESSION:   1.  T12 compression fracture. Please see accompanying thoracic spine CT report.   2.  Significant common duct dilatation. Short-term follow-up MRCP (with and without IV contrast) recommended if prior studies are unavailable to document stability.      CT Pelvis Bone wo Contrast   Final Result   IMPRESSION:   1.  No acute fracture is identified. If there is persistent concern, MRI would be more sensitive given the patient's degree of osseous demineralization.   2.  Mild degenerative changes in both hips.   3.  The lumbar spine is better evaluated on the separately dictated CT lumbar spine from the same day, please see that report for details.      Lumbar spine CT w/o contrast   Final Result   IMPRESSION:   1.  Acute fracture superior endplate of T12 with slight loss of height, minimal retropulsion.         Report per radiology    Laboratory:  Labs Ordered and Resulted from Time of ED Arrival to Time of ED Departure   BASIC METABOLIC PANEL - Abnormal       Result Value    Sodium 141      Potassium 4.8      Chloride 105      Carbon Dioxide (CO2) 27      Anion Gap 9      Urea Nitrogen 18.4      Creatinine 0.74      Calcium 9.5      Glucose 111 (*)     GFR Estimate 83     CBC WITH PLATELETS AND DIFFERENTIAL - Abnormal    WBC Count 6.5      RBC Count 3.55 (*)     Hemoglobin 12.3      Hematocrit 36.3       (*)     MCH 34.6 (*)     MCHC 33.9      RDW 11.3      Platelet Count 225      % Neutrophils 79      % Lymphocytes 12      % Monocytes 7      % Eosinophils 1      % Basophils 0      % Immature Granulocytes 1      NRBCs per 100 WBC 0      Absolute Neutrophils 5.1      Absolute Lymphocytes 0.8      Absolute Monocytes 0.5      Absolute Eosinophils 0.0      Absolute Basophils 0.0      Absolute Immature Granulocytes 0.1       Absolute NRBCs 0.0     HEPATIC FUNCTION PANEL - Normal    Protein Total 6.7      Albumin 4.2      Bilirubin Total 0.2      Alkaline Phosphatase 81      AST 29      ALT 21      Bilirubin Direct <0.20     TYPE AND SCREEN, ADULT    ABO/RH(D) A POS      Antibody Screen Negative      SPECIMEN EXPIRATION DATE [id number]     ABO/RH TYPE AND SCREEN        Emergency Department Course & Assessments:       Interventions:  Medications   HYDROmorphone (PF) (DILAUDID) injection 0.5 mg (0.5 mg Intravenous $Given 23 163)   ondansetron (ZOFRAN) injection 4 mg (4 mg Intravenous $Given 23 1634)   HYDROmorphone (PF) (DILAUDID) injection 0.5 mg (0.5 mg Intravenous $Given 23 1706)     Assessments:  155 I obtained history and examined the patient as noted above.    I rechecked the patient and explained findings.  Patient now complaining of some abdominal pain that was not there before.  Notes her back pain is better after pain meds.  On exam tenderness to the epigastric area and periumbilical area and lower anterior ribs.  After discussion with patient and family will send back for additional imaging.    Independent Interpretation (X-rays, CTs, rhythm strip):  None    Consultations/Discussion of Management or Tests:  None   The patient arrived in triage where vitals were measured and recorded.   The patient was then escorted back to the emergency department.   The patient's medical records were reviewed.  Nursing notes and vitals were reviewed.    I performed an exam of the patient as documented above. The patient is in agreement with my plan of care.     Social Determinants of Health affecting care:   None    Disposition:  Patient's care was signed out to Dr. Gleason supervising ED physician pending completion of remaining CT images with plan for likely admission for pain control.    Impression & Plan      Medical Decision Makin-year-old female presents with low back and tailbone pain after minor mechanical  ground-level fall.  Quite uncomfortable appearing on initial exam.  Given advanced age and osteoporosis CT scan of the lumbar spine and pelvis obtained which showed T12 compression fracture no other acute lumbar pelvic fracture and no evidence of hip fracture on exam.  Did not hit head or neck and C-spine cleared clinically.  Is not anticoagulated and there was no loss of consciousness.  Initial head to toe trauma exam otherwise nonconcerning.  Neurologic exam normal no indication for emergent surgical consult of the spine.    Patient requiring multiple doses of pain medication for control of her spinal fracture.  On recheck now reporting some epigastric abdominal pain and does have a little bit of tenderness on exam there as well as to the lower ribs.  Patient has contrast allergy listed after discussion with patient and family they are uncertain what this is and I do not see any recent record of receiving contrast scan and therefore will obtain Noncon CT of the chest abdomen and thoracic spine to rule out additional traumatic injuries I do not think contrast is essential given minor ground-level fall and mechanism and very low suspicion for serious internal traumatic injury.  These were ordered and are pending.  Staffed with and signed out to Dr. Gleason pending completion of CT scans, but will likely require admission for pain control as still not really able to reposition much despite several doses of Dilaudid.    Diagnosis:    ICD-10-CM    1. Fall, initial encounter  W19.XXXA       2. Other closed fracture of twelfth thoracic vertebra, initial encounter (H)  S22.088A            Discharge Medications:  New Prescriptions    No medications on file      Scribe Disclosure:  I, Nikolas Agrawal, am serving as a scribe at 3:48 PM on 4/4/2023 to document services personally performed by Abhilash French PA-C based on my observations and the provider's statements to me.   4/4/2023   Abhilash French PA-C        Abhilash French PA-C  04/04/23 0281

## 2023-04-04 NOTE — ED TRIAGE NOTES
Pt arrives via EMS w/ complaints of fall - per EMS pt fell backwards onto butt. Pt complains of L hip pain - pt crawled 15 ft to phone. No neck pain, pt complains of back pain - pt arrives w/ c-collar in place from EMS. EMS gave 50 fent. No blood thinners, pt denies hitting head, no LOC.

## 2023-04-05 ENCOUNTER — DOCUMENTATION ONLY (OUTPATIENT)
Dept: ORTHOPEDICS | Facility: CLINIC | Age: 78
End: 2023-04-05
Payer: MEDICARE

## 2023-04-05 LAB
ALBUMIN SERPL BCG-MCNC: 3.7 G/DL (ref 3.5–5.2)
ALP SERPL-CCNC: 115 U/L (ref 35–104)
ALT SERPL W P-5'-P-CCNC: 42 U/L (ref 10–35)
ANION GAP SERPL CALCULATED.3IONS-SCNC: 6 MMOL/L (ref 7–15)
AST SERPL W P-5'-P-CCNC: 58 U/L (ref 10–35)
BILIRUB SERPL-MCNC: 0.4 MG/DL
BUN SERPL-MCNC: 17.1 MG/DL (ref 8–23)
CALCIUM SERPL-MCNC: 8.7 MG/DL (ref 8.8–10.2)
CHLORIDE SERPL-SCNC: 103 MMOL/L (ref 98–107)
CREAT SERPL-MCNC: 0.7 MG/DL (ref 0.51–0.95)
DEPRECATED CALCIDIOL+CALCIFEROL SERPL-MC: 36 UG/L (ref 20–75)
DEPRECATED HCO3 PLAS-SCNC: 29 MMOL/L (ref 22–29)
ERYTHROCYTE [DISTWIDTH] IN BLOOD BY AUTOMATED COUNT: 11.3 % (ref 10–15)
GFR SERPL CREATININE-BSD FRML MDRD: 89 ML/MIN/1.73M2
GLUCOSE SERPL-MCNC: 97 MG/DL (ref 70–99)
HCT VFR BLD AUTO: 34.6 % (ref 35–47)
HGB BLD-MCNC: 11.2 G/DL (ref 11.7–15.7)
MCH RBC QN AUTO: 34.1 PG (ref 26.5–33)
MCHC RBC AUTO-ENTMCNC: 32.4 G/DL (ref 31.5–36.5)
MCV RBC AUTO: 106 FL (ref 78–100)
PLATELET # BLD AUTO: 185 10E3/UL (ref 150–450)
POTASSIUM SERPL-SCNC: 4 MMOL/L (ref 3.4–5.3)
PROT SERPL-MCNC: 5.9 G/DL (ref 6.4–8.3)
RBC # BLD AUTO: 3.28 10E6/UL (ref 3.8–5.2)
SODIUM SERPL-SCNC: 138 MMOL/L (ref 136–145)
WBC # BLD AUTO: 4.4 10E3/UL (ref 4–11)

## 2023-04-05 PROCEDURE — 85027 COMPLETE CBC AUTOMATED: CPT | Performed by: PHYSICIAN ASSISTANT

## 2023-04-05 PROCEDURE — 120N000001 HC R&B MED SURG/OB

## 2023-04-05 PROCEDURE — 99221 1ST HOSP IP/OBS SF/LOW 40: CPT | Performed by: NURSE PRACTITIONER

## 2023-04-05 PROCEDURE — 36415 COLL VENOUS BLD VENIPUNCTURE: CPT | Performed by: PHYSICIAN ASSISTANT

## 2023-04-05 PROCEDURE — 80053 COMPREHEN METABOLIC PANEL: CPT | Performed by: PHYSICIAN ASSISTANT

## 2023-04-05 PROCEDURE — 99232 SBSQ HOSP IP/OBS MODERATE 35: CPT | Performed by: INTERNAL MEDICINE

## 2023-04-05 PROCEDURE — 250N000011 HC RX IP 250 OP 636: Performed by: PHYSICIAN ASSISTANT

## 2023-04-05 PROCEDURE — 250N000013 HC RX MED GY IP 250 OP 250 PS 637: Performed by: PHYSICIAN ASSISTANT

## 2023-04-05 RX ADMIN — OXYCODONE HYDROCHLORIDE 2.5 MG: 5 TABLET ORAL at 09:51

## 2023-04-05 RX ADMIN — MULTIPLE VITAMINS W/ MINERALS TAB 3 TABLET: TAB at 07:45

## 2023-04-05 RX ADMIN — ACETAMINOPHEN 975 MG: 325 TABLET, FILM COATED ORAL at 20:51

## 2023-04-05 RX ADMIN — ACETAMINOPHEN 975 MG: 325 TABLET, FILM COATED ORAL at 12:47

## 2023-04-05 RX ADMIN — ACETAMINOPHEN 975 MG: 325 TABLET, FILM COATED ORAL at 04:07

## 2023-04-05 RX ADMIN — OXYCODONE HYDROCHLORIDE 5 MG: 5 TABLET ORAL at 02:36

## 2023-04-05 RX ADMIN — HYDROMORPHONE HYDROCHLORIDE 0.4 MG: 0.2 INJECTION, SOLUTION INTRAMUSCULAR; INTRAVENOUS; SUBCUTANEOUS at 06:51

## 2023-04-05 RX ADMIN — POLYETHYLENE GLYCOL 3350 17 G: 17 POWDER, FOR SOLUTION ORAL at 07:46

## 2023-04-05 RX ADMIN — LEVOTHYROXINE SODIUM 75 MCG: 0.07 TABLET ORAL at 07:45

## 2023-04-05 RX ADMIN — OXYCODONE HYDROCHLORIDE 5 MG: 5 TABLET ORAL at 14:25

## 2023-04-05 RX ADMIN — OXYCODONE HYDROCHLORIDE 2.5 MG: 5 TABLET ORAL at 10:25

## 2023-04-05 RX ADMIN — MULTIPLE VITAMINS W/ MINERALS TAB 3 TABLET: TAB at 16:55

## 2023-04-05 RX ADMIN — FLUOXETINE 40 MG: 20 CAPSULE ORAL at 07:45

## 2023-04-05 RX ADMIN — OXYCODONE HYDROCHLORIDE 5 MG: 5 TABLET ORAL at 22:42

## 2023-04-05 RX ADMIN — ENOXAPARIN SODIUM 30 MG: 30 INJECTION SUBCUTANEOUS at 22:42

## 2023-04-05 RX ADMIN — BUPROPION HYDROCHLORIDE 150 MG: 150 TABLET, FILM COATED, EXTENDED RELEASE ORAL at 07:46

## 2023-04-05 RX ADMIN — HYDROXYZINE HYDROCHLORIDE 25 MG: 25 TABLET ORAL at 17:35

## 2023-04-05 RX ADMIN — OXYCODONE HYDROCHLORIDE 5 MG: 5 TABLET ORAL at 18:39

## 2023-04-05 RX ADMIN — LISINOPRIL 20 MG: 20 TABLET ORAL at 07:45

## 2023-04-05 ASSESSMENT — ACTIVITIES OF DAILY LIVING (ADL)
ADLS_ACUITY_SCORE: 36
ADLS_ACUITY_SCORE: 40
ADLS_ACUITY_SCORE: 36
DEPENDENT_IADLS:: INDEPENDENT
ADLS_ACUITY_SCORE: 36
ADLS_ACUITY_SCORE: 40
ADLS_ACUITY_SCORE: 36
ADLS_ACUITY_SCORE: 34
ADLS_ACUITY_SCORE: 36
ADLS_ACUITY_SCORE: 36
ADLS_ACUITY_SCORE: 40

## 2023-04-05 NOTE — PROGRESS NOTES
S: Debbie Boucher was seen at [facility], Room 545-01, for the evaluation and measurements for a custom TLSO.     Dx: Acute T12 burst fracture with approximately 25% vertebral body height loss and 3 mm retropulsion of the posterior superior cortex. No posttraumatic subluxation. Hx of Osteoporosis.    O: A Custom TLSO is medically necessary and recommended by Neurosurgery due to severity and instability of spinal fracture.     A: Measurements were completed without incident. Custom [employer] TLSO with a Flex Foam liner and rigid thermoplastic external frame.      P: The TLSO will be fabricated and delivered, pending any unforeseen circumstances, by Thursday afternoon (4/6/2023).  If possible, the TLSO will be delivered earlier.  At the time of delivery, the TLSO will be fit to the patient and all donning/doffing and care instructions will be provided to the patient. (Instructional information is additionally provided below.)    G: The TLSO increases medial-lateral, rotational and anterior-posterior stability of the spine.  The TLSO also applies compression to the patient s soft tissues.  Compression of the patient s soft tissues serves to unload the injured vertebrae which reduces pain and promotes healing.  The goal of this orthosis is to provide spinal stability, promote healing and reduce pain while the patient performs their ADLs.    Electronically signed by Baltazar Garcia CPO, LPO          How to Put on a TLSO Back Brace  For optimal fit brace should NOT be donned with patient sitting. Ideal fit is achieved by donning in either laying or standing position. Due to changes in belly tissue, it is difficult to achieve a proper fit when patient is sitting.    1. Apply a snug-fitting cotton/moisture wicking shirt.  Loose shirts may cause wrinkles and skin irritation.  2. Patient should be supine. Palpate for waist (below ribs but above hips) so you know where to line up waist grooves of the brace.  3.  Logroll patient and slide back section of brace under patient lining up waist groove of brace with patient's waist.  4. Roll patient onto their back with the posterior section behind them. Recheck alignment of waist groove on brace with patient's waist. It may be necessary to logroll patient to the opposite side to get posterior section centered on patient, where it extends anteriorly equal amounts on patient's sides. Repeat logrolling side to side as necessary.  5. Once posterior section is positioned correctly, lay anterior section on patient. Again, line up waist groove of brace to patient's waist. Waist grooves of anterior and posterior section should match up and fit together. Anterior shell should go over the top of posterior shell. Velcro straps should line up with the chafes/D-rings, if they don't, either the anterior or the posterior sections are not in the proper place.  6. Fasten the middle straps first and tighten both sides evenly. Then fasten either top or bottom straps in the same manner. Brace should be snug so as to prevent brace from sliding up on patient. If it is too loose, the brace will slide up and cause discomfort to the patient in the chin and/or axilla area.  7. When properly fit, brace the inferior aspect should allow clearance so as not to cut into the tops of the thighs when sitting but needs to be as low on the pelvic area as possible.    Wearing Schedule  Always check with prescribing doctor for a precise wearing schedule. At times the TLSO is worn only when out of bed while sitting or standing. Other times, the doctor requires the TLSO to be worn at all times.    Cleaning and Maintenance  Rubbing alcohol may be used to clean the inside and outside of the TLSO. Spray TLSO with alcohol and wipe gently with a cloth. Be sure that TLSO is completely dry prior to application to ensure no skin issues will occur. Always wear a clean, dry, snug-fitting shirt under the brace.    Tips and  Problem Solving  Brace migration is inevitable, especially when patient is going from laying to upright in bed. The mattress may push posterior section of brace up, which will push the whole brace up. Migration may also occur when patient sits in a soft cushioned chair. Don't be afraid to readjust brace and pull it down and back to its proper position.    Avoid soft seat cushioned chairs and sit up straight. Soft seat cushions or leaning back into a chair will cause the brace to migrate upward and may place pressure underarms.      Instead of leaning back, try sitting on the front half of the seat of the chair and work your way back until you contact the backrest of the chair.  Sometimes using a pillow in the gap between the back of the chair and the brace will provide more support.    Do not lean forward over a table while eating. Bring the food up to the mouth. This will reduce any pressure on the thighs and chest.    If the TLSO starts to migrate upward under the throat/armpits, ensure that straps are snug. Straps that are loose will allow the brace to shift.

## 2023-04-05 NOTE — H&P
[facility]    History and Physical - Hospitalist Service       Date of Admission:  4/4/2023    Assessment & Plan Debbie Boucher is a 77 year old female with past medical history significant for HTN, hypothyroidism, DGD, migraine headaches, hearing loss, MDD, Anxiety, prior gastric bypass surgery, who was admitted on 4/4/2023 with T12 compression fracture.    Fall suspected mechanical  T12 compression fracture  DGD  Osteoporosis   Lost balance cleaning cat litter box fell backwards with immediate mid to low back pain.  Neurologically intact.  No head injury or LOC.  Advanced imaging was performed CT thoracic and lumbar spine, pelvis, chest and abdomen.  Found to have acute fracture of the superior endplate T12 with 30% loss of height and 2 mm of retropulsion without additional fractures.  Osteopenic bones.  Neurosurgery was contacted from the emergency department who recommended bedrest until TLSO. No surgical plans were currently recommended.  Reportedly balance issues have been a concern and may have led to fall.   -bedrest, further weightbearing and activity restrictions per NSG  -NSG consult  -Analgesia PRN  -Anticipate will need increased services versus TCU, PT, OT for balance and social work consult when appropriate.   -check vit d, Osteoporosis management   -orthotics for TLSO brace    Incidental Significant biliary dilatation   Noted on imaging, no GI symptoms and CMP stable.   Biliary dilation greater than would be expected for postcholecystectomy state, measures up to 18 mm in diameter.last I am able to review CT scan of abdomen in 2012 did note marked intra and extrahepatic biliary dilation with the extrahepatic bile duct measuring up to 19 mm.  - consider close follow-up MRCP (with and without IV contrast) otherwise could hold off if felt to be stable, I did discuss this with the patient and daughter on admission     Murmur   Asymptomatic, no symptoms of CHF does have a systolic  "murmur on exam which is not previously reported or documented. No recent echocardiogram.  -if there are any surgical plans, obtain echo while admitted otherwise outpatient    Lung, Pleural Scarring  Noted on CT after initial evaluation of patient.  CT scan demonstrates moderate biapical pleural-parenchymal scarring and intralobular septal thickening bilaterally that may represent early fibrosis.  - monitor resp symptoms  - consider outpatient Pulmonology follow up, PFTs etc.     Hypertension  - resume BP meds with parameters     History of gastric bypass surgery  - Weight 42.2 kg on admit, 44.6 last April 2022 per chart review.   - monitor weight, intake. Consider nutrition consult or at TCU     Hearing Loss   -Patient is quite hard of hearing without hearing aids in and there is concern from family about where that she is understanding questions are discussing medical plans.  Family, daughter would like to be involved with discussions about care plan etc.  -Pocket talker as needed    LEXIE  MDD  Migraine, Headache   - resume PRN medications       Diet:  Regular diet     DVT Prophylaxis: Enoxaparin (Lovenox) SQ  Perez Catheter: Not present    Cardiac Monitoring: None    Code Status:   Full code discussed with the patient on admission, she would not like long-term life support or exhaustive efforts if outcome is expected to be poor.     Clinically Significant Risk Factors Present on Admission                       # Cachexia: Estimated body mass index is 17.57 kg/m  as calculated from the following:    Height as of this encounter: 1.549 m (5' 1\").    Weight as of this encounter: 42.2 kg (93 lb).           Disposition Plan      Expected Discharge Date: 04/06/2023,  3:00 PM      Discharge Comments: to home with increased services and HHC versus TCU.  Comes from home independently.      The patient's care was discussed with the Patient's Family and ED Team.    Maribell Hernandez PA-C  Hospitalist Service  [facility] " "[facility]  Securely message with [employer] (more info)  Text page via [facility] Paging/Directory     ______________________________________________________________________    Chief Complaint   \"fall\"    History is obtained from the patient    History of Present Illness   Debbie Boucher is a 77 year old female who was brought to the emergency department for back pain after a fall.   Ms. Boucher sustained a fall backward today she was bending down to clean out a cat litter box this afternoon.  She was attempting to stand back up when she lost her balance and fell backwards landing on her back.  She developed immediate mid to low back pain.  She crawled to her cell phone to call for help.  EMS came to the home and transported her to the emergency department for evaluation.    On presentation mildly hypertensive vital signs otherwise stable.  Lab work including CBC and CMP largely unremarkable.  Type and cross was added.  Advanced imaging was performed CT thoracic and lumbar spine, pelvis, chest and abdomen.  Found to have acute fracture of the superior endplate T12 with 30% loss of height and 2 mm of retropulsion without additional fractures.  Osteopenic bones.  Neurosurgery was contacted from the emergency department who recommended bedrest until TLSO.     In the ED given IV Dilaudid, Tylenol, and Zofran.    Admission was requested from hospitalist service for T12 fracture.    On my evaluation the patient reports ongoing mid to low back pain.  Pain does wrap around her abdomen bilaterally at times.  She denies any diminished sensation in her lower extremities.  No unilateral weakness in legs.  Denies pain elsewhere.  No groin anesthesia or new bowel or bladder incontinence.  Currently has a pure wick in place.  Denies any pain elsewhere.  Patient's daughter is at bedside and reports that there has been concerns raised about the patient's balance and her driving.  The patient denies any lightheadedness or " presyncopal symptoms.  She denied any head injury with the fall.  She denies any recent illness.  No nausea, vomiting or abdominal pain.    Past Medical History    Past Medical History:   Diagnosis Date     Anxiety      Depressive disorder      Migraines        Past Surgical History   Past Surgical History:   Procedure Laterality Date     APPENDECTOMY       CHOLECYSTECTOMY       ENT SURGERY      t and a      GENITOURINARY SURGERY      bladder neck suspension     GYN SURGERY      hysterectomy     HERNIA REPAIR       ORTHOPEDIC SURGERY         Prior to Admission Medications   Prior to Admission Medications   Prescriptions Last Dose Informant Patient Reported? Taking?   LEVOTHYROXINE SODIUM PO   Yes No   Sig: Take 100 mcg by mouth daily   Rizatriptan Benzoate (MAXALT PO)   Yes No   Sig: Take 10 mg by mouth once as needed for migraine   TRAZODONE HCL PO   Yes No   Sig: Take 100 mg by mouth At Bedtime    VENLAFAXINE HCL PO   Yes No   Sig: Take 100 mg by mouth 2 times daily    amitriptyline (ELAVIL) 10 MG tablet   No No   Sig: Start at 1 tab at bedtime for 3 days, then 2 tabs at bedtime for 3 days, then 3 tabs at bedtime.  Plan to increase dose to 50-75 mg at bedtime after 1 month   hydrOXYzine (ATARAX) 25 MG tablet   No No   Sig: Take 1-2 tablets (25-50 mg) by mouth every 6 hours as needed for anxiety   multivitamin, therapeutic with minerals (THERA-VIT-M) TABS tablet   Yes No   Sig: Take 3 tablets by mouth 2 times daily      Facility-Administered Medications: None        Review of Systems    The 10 point Review of Systems is negative other than noted in the HPI or here.     Social History   I have reviewed this patient's social history and updated it with pertinent information if needed. Lives independently in home.  Denies tobacco use or alcohol use.        Physical Exam   Vital Signs: Temp: 98  F (36.7  C) Temp src: Temporal BP: (!) 157/85 Pulse: 67   Resp: 20 SpO2: 100 % O2 Device: None (Room air)    Weight: 93 lbs 0  oz    Constitutional: Awake, alert,  no apparent distress.  Eyes: Conjunctiva and pupils examined and normal.  HEENT: Moist mucous membranes, normal dentition.  Respiratory: Clear to auscultation bilaterally, no crackles or wheezing.  Cardiovascular: Regular rate and rhythm, normal S1 and S2, and systolic murmur noted.  GI: Soft, non-distended, non-tender, bowel sounds present. No rebound tenderness or guarding.  Lymph/Hematologic: No anterior cervical or supraclavicular adenopathy.  Skin: No rashes, no cyanosis, no edema.  Musculoskeletal: Tender to palpation of  Midline thoracic, lumbar spinous process.  No visible paraspinal muscle spasms.  Moving all extremities in bed with symmetrical strength. No pain with compression of pelvis. SLR at hips bilaterally intact. Coordination and sensation is grossly intact.  No erythema or gross deformities.   Neurologic: No focal deficits noted. Hard of hearing. Speech is clear. Coordination and strength grossly normal.   Psychiatric: Appropriate affect.       Medical Decision Making       75 MINUTES SPENT BY ME on the date of service doing chart review, history, exam, documentation & further activities per the note.      Data   ------------------------- PAST 24 HR DATA REVIEWED -----------------------------------------------

## 2023-04-05 NOTE — CONSULTS
Care Management Initial Consult    General Information  Assessment completed with: Patient, Family,    Type of CM/SW Visit: Initial Assessment    Primary Care Provider verified and updated as needed: Yes   Readmission within the last 30 days:        Reason for Consult: discharge planning  Advance Care Planning:            Communication Assessment  Patient's communication style: spoken language (English or Bilingual)    Hearing Difficulty or Deaf: yes   Wear Glasses or Blind: yes    Cognitive  Cognitive/Neuro/Behavioral: WDL                      Living Environment:   People in home: alone     Current living Arrangements: house      Able to return to prior arrangements:         Family/Social Support:  Care provided by: self  Provides care for: no one     Children          Description of Support System: Supportive, Involved         Current Resources:   Patient receiving home care services: No     Community Resources: Meals on Wheels  Equipment currently used at home: none  Supplies currently used at home: None    Employment/Financial:  Employment Status:          Financial Concerns:             Lifestyle & Psychosocial Needs:  Social Determinants of Health     Tobacco Use: Not on file   Alcohol Use: Not on file   Financial Resource Strain: Not on file   Food Insecurity: Not on file   Transportation Needs: Not on file   Physical Activity: Not on file   Stress: Not on file   Social Connections: Not on file   Intimate Partner Violence: Not on file   Depression: Not on file   Housing Stability: Not on file       Functional Status:  Prior to admission patient needed assistance:   Dependent ADLs:: Independent  Dependent IADLs:: Independent       Mental Health Status:          Chemical Dependency Status:                Values/Beliefs:  Spiritual, Cultural Beliefs, Restorationism Practices, Values that affect care:                 Additional Information:  SW met with patient & her daughter to complete initial assessment. Patient  resides in a townhouse alone prior. She has 1 step to enter. Patient receives meal on wheels for 1 meal a day & has lawn care & snow removal services. SW discussed PT still needs to evaluate patient for discharge recommendations but it is anticipated patient will need home with home therapy or a transitional care unit at discharge. Patient voiced she has not used either services prior but would be agreeable. Patient requested her daughter be involved in all discharge planning discussions to assist with decision making. Family also stated they will provide transportation at discharge. SW answered questions related to the discharge planning process & TCU placement. Patient denied any additional needs or questions at this time. SW will continue to follow to assist with discharge planning prn.     DASHA Mckeon

## 2023-04-05 NOTE — CONSULTS
"[facility]    Neurosurgery Consultation     Date of Admission:  4/4/2023  Date of Consult (When I saw the patient): 04/05/23    Assessment & Plan   Debbie Boucher is a 77 year old female with history of osteoporosis who was admitted on 4/4/2023 with back pain s/p mechanical fall. Neurosurgery was consulted for \"T12 fracture\". Radiographic findings include an acute T12 burst fracture with approximately 25% vertebral body height loss and minimal retropulsion.     Principal Problem:    Common bile duct dilatation    Burst fracture of T12 vertebra (H)    Fall, initial encounter      Plan:   - No surgical intervention planned at this time  - Orthotics consult for TLSO brace    - Avoid heavy lifting, bending, twisting  - Continue pain control measures as needed  - Social Work following for discharge planning - HHC vs TCU  - Follow up with NSG clinic in 6 weeks with xray prior   - Follow up with PCP for osteoporosis management   - Appreciate assistance from specialties    I have discussed the following assessment and plan with Dr. Bailey.     Monie Woods, [facility] [place] [address]  Tel [phone]  Pager [phone]    Code Status    Full Code    Reason for Consult   Reason for consult: I was asked by Maribell Hernandez PA-C to evaluate this patient for T12 fracture.    Primary Care Physician   Arlin Young    Chief Complaint   Back pain, fall     History is obtained from the patient and electronic health record    History of Present Illness   Debbie Boucher is a 77 year old female with history of osteoporosis who was admitted on 4/4/2023 with back pain s/p mechanical fall yesterday. Patient reports she was cleaning her cat's litter box, lost balance, and fell backwards onto her mid to low back. She developed immediate mid to low back pain. She presented to the ED via EMS. Imaging showed an acute " T12 burst fracture with approximately 25% vertebral body height loss and minimal retropulsion. Today, patient reports 6/10 back pain. Denies any leg pain, numbness, tingling, or weakness.     EXAM: CT THORACIC SPINE W/O CONTRAST  LOCATION: [facility]  DATE/TIME: 4/4/2023 6:34 PM  IMPRESSION:  1.  Acute T12 burst fracture with mild height loss and minimal retropulsion.   2.  No high-grade canal stenosis or traumatic subluxation.    Past Medical History   I have reviewed this patient's medical history and updated it with pertinent information if needed.   Past Medical History:   Diagnosis Date     Anxiety      Depressive disorder      Migraines        Past Surgical History   I have reviewed this patient's surgical history and updated it with pertinent information if needed.  Past Surgical History:   Procedure Laterality Date     APPENDECTOMY       CHOLECYSTECTOMY       ENT SURGERY      t and a      GENITOURINARY SURGERY      bladder neck suspension     GYN SURGERY      hysterectomy     HERNIA REPAIR       ORTHOPEDIC SURGERY         Prior to Admission Medications   Prior to Admission Medications   Prescriptions Last Dose Informant Patient Reported? Taking?   FLUoxetine (PROZAC) 40 MG capsule 4/4/2023 at AM  Yes Yes   Sig: Take 40 mg by mouth daily   LEVOTHYROXINE SODIUM PO 4/4/2023 at AM  Yes Yes   Sig: Take 75 mcg by mouth daily   Rizatriptan Benzoate (MAXALT PO) More than a month  Yes Yes   Sig: Take 10 mg by mouth once as needed for migraine   TRAZODONE HCL PO 4/3/2023 at PM  Yes Yes   Sig: Take 100 mg by mouth At Bedtime    buPROPion (WELLBUTRIN XL) 150 MG 24 hr tablet 4/4/2023 at AM  Yes Yes   Sig: Take 150 mg by mouth every morning   lisinopril (ZESTRIL) 20 MG tablet 4/4/2023 at AM  Yes Yes   Sig: Take 20 mg by mouth daily   multivitamin, therapeutic with minerals (THERA-VIT-M) TABS tablet Past Month  Yes Yes   Sig: Take 3 tablets by mouth 2 times daily   omeprazole (PRILOSEC) 40 MG   "capsule 4/4/2023 at AM  Yes Yes   Sig: Take 40 mg by mouth daily      Facility-Administered Medications: None     Allergies   Allergies   Allergen Reactions     Hydrochlorothiazide Hives     Codeine      Contrast Dye      Diagnostic X-Ray Materials Itching     Levofloxacin      Sulfa Drugs Itching     topical       Social History   I have reviewed this patient's social history and updated it with pertinent information if needed. Debbie RUIZ Sander      Family History   I have reviewed this patient's family history and updated it with pertinent information if needed.   No family history on file.    Review of Systems   10 point ROS negative other than symptoms noted in HPI.    Physical Exam   Temp: 98.6  F (37  C) Temp src: Oral BP: 138/54 Pulse: 63   Resp: 16 SpO2: 93 % O2 Device: None (Room air) Oxygen Delivery: 2 LPM  Vital Signs with Ranges  Temp:  [97.7  F (36.5  C)-98.6  F (37  C)] 98.6  F (37  C)  Pulse:  [62-76] 63  Resp:  [16-20] 16  BP: (127-187)/() 138/54  SpO2:  [93 %-100 %] 93 %  93 lbs 1.6 oz     , Blood pressure 138/54, pulse 63, temperature 98.6  F (37  C), temperature source Oral, resp. rate 16, height 1.549 m (5' 1\"), weight 42.2 kg (93 lb 1.6 oz), SpO2 93 %.  93 lbs 1.6 oz    NEUROLOGICAL EXAMINATION:   Mental status:  Alert and Oriented x 3, speech is fluent.  Motor:    Iliopsoas  (hip flexion)               Right: 5/5  Left:  5/5  Quadriceps  (knee extension)       Right:  5/5  Left:  5/5  Hamstrings  (knee flexion)            Right:  5/5  Left:  5/5  Gastroc Soleus  (PF)                          Right:  5/5  Left:  5/5  Tibialis Ant  (DF)                          Right:  5/5  Left:  5/5  EHL                          Right:  5/5  Left:  5/5    Sensation:  Intact to light touch   Reflexes:   Negative Clonus    Moderate tenderness to palpation of the lower thoracic spine.   Straight leg raise is negative bilaterally.     Data     CBC RESULTS:   Recent Labs   Lab Test 04/05/23  0742   WBC 4.4 "   RBC 3.28*   HGB 11.2*   HCT 34.6*   *   MCH 34.1*   MCHC 32.4   RDW 11.3        Basic Metabolic Panel:  Lab Results   Component Value Date     04/05/2023     02/01/2017      Lab Results   Component Value Date    POTASSIUM 4.0 04/05/2023    POTASSIUM 3.8 02/01/2017     Lab Results   Component Value Date    CHLORIDE 103 04/05/2023    CHLORIDE 104 02/01/2017     Lab Results   Component Value Date    NAMAN 8.7 04/05/2023    NAMAN 8.3 02/01/2017     Lab Results   Component Value Date    CO2 29 04/05/2023    CO2 31 02/01/2017     Lab Results   Component Value Date    BUN 17.1 04/05/2023    BUN 9 02/01/2017     Lab Results   Component Value Date    CR 0.70 04/05/2023    CR 0.71 02/01/2017     Lab Results   Component Value Date    GLC 97 04/05/2023    GLC 94 02/01/2017     INR:  No results found for: INR

## 2023-04-05 NOTE — PROGRESS NOTES
[facility]  Hospitalist Progress Note  Tony Chery MD 04/05/23    Reason for Stay (Diagnosis): Fall with T12 fracture         Assessment and Plan:      Summary of Stay: Debbie Boucher is a 77 year old female with past medical history significant for HTN, hypothyroidism, DGD, migraine headaches, hearing loss, MDD, Anxiety, prior gastric bypass surgery, who was admitted on 4/4/2023 with T12 compression fracture.    Essentially, prior to admission Lost balance cleaning cat litter box fell backwards with immediate mid to low back pain.  Neurologically intact.  No head injury or LOC.  Advanced imaging was performed CT thoracic and lumbar spine, pelvis, chest and abdomen.  Found to have acute fracture of the superior endplate T12 with 30% loss of height and 2 mm of retropulsion without additional fractures.  Osteopenic bones.  Neurosurgery was contacted from the emergency department who recommended bedrest until TLSO. No surgical plans were currently recommended.  Reportedly balance issues have been a concern and may have led to fall, and it is possible she will require TCU placement.       Fall suspected mechanical  T12 compression fracture  DGD  Osteoporosis   -bedrest until brace is placed, further weightbearing and activity restrictions per NSG  -NSG consult  -Analgesia PRN: Scheduled Tylenol, as needed Toradol, as needed oxycodone oral and as needed IV Dilaudid  -Anticipate will need increased services versus TCU, PT, OT for balance and social work consult   -check vit d, Osteoporosis management   -orthotics for TLSO brace     Incidental Significant biliary dilatation: Apparently this is chronic and noted on a CT scan as far back as 2012.  --Given absence of abdominal symptoms or LFT abnormalities we will hold off on further evaluation for now.    Murmur   Asymptomatic, no symptoms of CHF does have a systolic murmur on exam which is not previously reported or documented. No recent  "echocardiogram.  -if there are any surgical plans, obtain echo while admitted otherwise outpatient     Lung, Pleural Scarring  Noted on CT after initial evaluation of patient.  CT scan demonstrates moderate biapical pleural-parenchymal scarring and intralobular septal thickening bilaterally that may represent early fibrosis.  - monitor resp symptoms  - consider outpatient Pulmonology follow up, PFTs etc.      Hypertension  - resume home BP meds with parameters      History of gastric bypass surgery  - Weight 42.2 kg on admit, 44.6 last April 2022 per chart review.   - monitor weight, intake. Consider nutrition consult or at TCU      Hearing Loss   -Patient is quite hard of hearing without hearing aids in and there is concern from family about where that she is understanding questions are discussing medical plans.  Family, daughter would like to be involved with discussions about care plan etc.  -Pocket talker as needed     LEXIE  MDD  Migraine, Headache   - resume PRN medications           Diet:  Regular diet      DVT Prophylaxis: Enoxaparin (Lovenox) SQ  Perez Catheter: Not present  Cardiac Monitoring: None  Code Status:   Full code discussed with the patient on admission, she would not like long-term life support or exhaustive efforts if outcome is expected to be poor.   Disposition: Likely here 1 to 2 days more than either home with services or TCU depending on needs.    Clinically Significant Risk Factors Present on Admission                  # Hypertension: home medication list includes antihypertensive(s)      # Cachexia: Estimated body mass index is 17.59 kg/m  as calculated from the following:    Height as of this encounter: 1.549 m (5' 1\").    Weight as of this encounter: 42.2 kg (93 lb 1.6 oz).                 Interval History (Subjective):      Working on pain control with oral pain medications as opposed to IV  Awaiting orthotist for bracing then will pursue therapies  Family updated at the bedside         " "  Physical Exam:      Last Vital Signs:  /54 (BP Location: Left arm)   Pulse 63   Temp 98.6  F (37  C) (Oral)   Resp 16   Ht 1.549 m (5' 1\")   Wt 42.2 kg (93 lb 1.6 oz)   SpO2 93%   BMI 17.59 kg/m        Intake/Output Summary (Last 24 hours) at 4/5/2023 1139  Last data filed at 4/5/2023 0556  Gross per 24 hour   Intake --   Output 250 ml   Net -250 ml       General: Alert, awake, no acute distress.  Thin woman lying in bed.  HEENT: NC/AT, eyes anicteric, external occular movements intact, face symmetric.  Cardiac: RRR, S1, S2.  No murmurs appreciated.  Pulmonary: Normal chest rise, normal work of breathing.  Lungs CTA BL  Abdomen: soft, non-tender, non-distended.   Extremities: no deformities.  Warm, well perfused.  Skin: no rashes or lesions noted.  Warm and Dry.  Neuro: No focal deficits noted.  Speech clear.    Psych: Appropriate affect.         Medications:      All current medications were reviewed with changes reflected in problem list.         Data:      All new lab and imaging data was reviewed.   Labs:  Recent Labs   Lab 04/05/23  0742      POTASSIUM 4.0   CHLORIDE 103   CO2 29   ANIONGAP 6*   GLC 97   BUN 17.1   CR 0.70   GFRESTIMATED 89   NAMAN 8.7*     Recent Labs   Lab 04/05/23  0742   WBC 4.4   HGB 11.2*   HCT 34.6*   *         Imaging:   Recent Results (from the past 48 hour(s))   Lumbar spine CT w/o contrast    Narrative    EXAM: CT LUMBAR SPINE W/O CONTRAST  LOCATION: [facility]  DATE/TIME: 4/4/2023 5:12 PM    INDICATION: Fall, back injury  COMPARISON: None.  TECHNIQUE: Routine CT Lumbar Spine without IV contrast. Multiplanar reformats. Dose reduction techniques were used.     FINDINGS:  VERTEBRA: Acute fracture superior endplate of T12 with 30% loss of height, 2 mm retropulsion. No additional fractures. Grade 1 anterolisthesis L4-L5. Minor dextrocurvature. Osteopenic bones.     CANAL/FORAMINA: No canal or neural foraminal " stenosis.    PARASPINAL: No extraspinal abnormality.      Impression    IMPRESSION:  1.  Acute fracture superior endplate of T12 with slight loss of height, minimal retropulsion.   CT Pelvis Bone wo Contrast    Narrative    EXAM: CT PELVIS BONE WO CONTRAST  LOCATION: [facility]  DATE/TIME: 4/4/2023 5:13 PM    INDICATION: Trauma, fall, pain.  COMPARISON: None.  TECHNIQUE: CT scan of the pelvis was performed without IV contrast. Multiplanar reformats were obtained. Dose reduction techniques were used.  CONTRAST: None.    FINDINGS:    PELVIS ORGANS: Vascular calcifications are noted. There is thinning of the anterior abdominal wall musculature, partially imaged.    MUSCULOSKELETAL: There is diffuse severe osseous demineralization, which decreases the CT sensitivity for the detection of osseous pathology.    There are mild degenerative changes in both hips. No acute fracture or dislocation is identified.      Impression    IMPRESSION:  1.  No acute fracture is identified. If there is persistent concern, MRI would be more sensitive given the patient's degree of osseous demineralization.  2.  Mild degenerative changes in both hips.  3.  The lumbar spine is better evaluated on the separately dictated CT lumbar spine from the same day, please see that report for details.   CT Chest Abodmen w/o Contrast    Narrative    EXAM: CT CHEST ABDOMEN W/O CONTRAST  LOCATION: [facility]  DATE/TIME: 4/4/2023 6:34 PM    INDICATION: Trauma, upper abdominal pain. contrast allergy  COMPARISON: None.  TECHNIQUE: CT scan of the chest and abdomen was performed without IV contrast. Multiplanar reformats were obtained. Dose reduction techniques were used. Lack of intravenous contrast limits sensitivity and specificity.   CONTRAST: None.    FINDINGS:   LUNGS AND PLEURA: Moderate biapical pleural-parenchymal scarring. Subpleural intralobular septal thickening bilaterally without honeycombing may  represent early fibrosis, right greater than left. Mild right lower lobe traction bronchiectasis. No   pneumothorax, lobar consolidation, or effusion.    MEDIASTINUM/AXILLAE: No mediastinal hematoma. Calcifications in the region of the aortic valve may be seen in setting of stenosis. Small hiatal hernia.    CORONARY ARTERY CALCIFICATION: Severe.    HEPATOBILIARY: Significant biliary dilatation is greater than would be expected for postcholecystectomy state. The common bile duct measures up to 18 mm in diameter.    PANCREAS: Unremarkable    SPLEEN: Unremarkable    ADRENAL GLANDS: Bilateral adrenal hyperplasia, left greater than right.    KIDNEYS/BLADDER: No hydronephrosis or perinephric collections.    BOWEL: Prior gastric surgery with small bowel anastomotic suture line in the left midabdomen.    LYMPH NODES: No upper abdominal adenopathy    VASCULATURE: No abdominal aortic aneurysm. Scattered atherosclerotic plaque.    MUSCULOSKELETAL: Previous ventral hernia repair with mesh in place. Bilateral breast implants. Anterior left seventh rib sclerotic region may represent a bone island. Mild thoracic scoliosis. Less than 50% T12 compression fracture. Generalized   osteopenia.      Impression    IMPRESSION:  1.  T12 compression fracture. Please see accompanying thoracic spine CT report.  2.  Significant common duct dilatation. Short-term follow-up MRCP (with and without IV contrast) recommended if prior studies are unavailable to document stability.   CT Thoracic Spine w/o Contrast    Narrative    EXAM: CT THORACIC SPINE W/O CONTRAST  LOCATION: [facility]  DATE/TIME: 4/4/2023 6:34 PM    INDICATION: Trauma  COMPARISON: None.  TECHNIQUE: Routine CT Thoracic Spine without IV contrast. Multiplanar reformats. Dose reduction techniques were used.     FINDINGS:  VERTEBRA:  Acute T12 burst fracture with approximately 25% vertebral body height loss and 3 mm retropulsion of the posterior superior cortex. No  posttraumatic subluxation.     CANAL/FORAMINA: No significant canal or neural foraminal stenosis.    PARASPINAL:  Very mild prevertebral edema at the level of the fracture. The thyroid glands appear atrophic.      Impression    IMPRESSION:  1.  Acute T12 burst fracture with mild height loss and minimal retropulsion.   2.  No high-grade canal stenosis or traumatic subluxation.         Tony Chery MD .    I've spent 50 minutes in chart review, ordering medications and tests, obtaining additional history as needed, evaluating the patient and in documentation for this encounter.

## 2023-04-05 NOTE — ED NOTES
"[facility]  ED Nurse Handoff Report    Debbie Boucher is a 77 year old female   ED Chief complaint: Fall  . ED Diagnosis:   Final diagnoses:   Fall, initial encounter   Burst fracture of T12 vertebra (H)   Common bile duct dilatation     Allergies:   Allergies   Allergen Reactions     Hydrochlorothiazide Hives     Codeine      Contrast Dye      Diagnostic X-Ray Materials Itching     Levofloxacin      Sulfa Drugs Itching     topical       Code Status: Full Code  Activity level - Baseline/Home:  Total Care. Activity Level - Current:   Total Care. Lift room needed: No. Bariatric: No   Needed: No   Isolation: No. Infection: Not Applicable.     Vital Signs:   Vitals:    04/04/23 1745 04/04/23 1830 04/04/23 1845 04/04/23 1900   BP:  (!) 179/91  (!) 157/85   Pulse:    67   Resp:    20   Temp:       TempSrc:       SpO2: 94% 100% 100% 100%   Weight:    42.2 kg (93 lb)   Height:    1.549 m (5' 1\")       Cardiac Rhythm:  ,      Pain level:    Patient confused: No. Patient Falls Risk: Yes.   Elimination Status: Has voided   Patient Report - Initial Complaint: back pain. Focused Assessment: T12 fracture   Tests Performed: labs, CT. Abnormal Results: none.   Treatments provided: pain meds  Family Comments: daughter at bedside  OBS brochure/video discussed/provided to patient:  N/A  ED Medications:   Medications   HYDROmorphone (PF) (DILAUDID) injection 0.5 mg (0.5 mg Intravenous $Given 4/4/23 1634)   ondansetron (ZOFRAN) injection 4 mg (4 mg Intravenous $Given 4/4/23 1634)   HYDROmorphone (PF) (DILAUDID) injection 0.5 mg (0.5 mg Intravenous $Given 4/4/23 1706)   acetaminophen (TYLENOL) tablet 1,000 mg (1,000 mg Oral $Given 4/4/23 1951)   ketorolac (TORADOL) injection 15 mg (15 mg Intravenous $Given 4/4/23 1951)     Drips infusing:  No  For the majority of the shift, the patient's behavior Green. Interventions performed were none.    Sepsis treatment initiated: No     Patient tested for COVID 19 prior " to admission: NO    ED Nurse Name/Phone Number: Angle Saleem RN,   7:59 PM    RECEIVING UNIT ED HANDOFF REVIEW    Above ED Nurse Handoff Report was reviewed: YES  Reviewed by: Graciela Ahumada RN on April 4, 2023 at 9:25 PM

## 2023-04-05 NOTE — PLAN OF CARE
End of Shift Summary  For vital signs and complete assessments, please see documentation flowsheets.     Pertinent assessments: A&Ox4. [place], pt has hearing aids in room but does not use them per daughter. VSS on room air, afebrile. Scheduled tylenol and PRN oxycodone given for pain management. Strict bedrest, purewick in place.     Major Shift Events: Admit to [facility].  Treatment Plan: Pain management, Neurosurgery consult.   Bedside Nurse: Graciela Ahumada RN

## 2023-04-05 NOTE — UTILIZATION REVIEW
"  Admission Status; Secondary Review Determination     Admission Date: 4/4/2023  3:36 PM      Under the authority of the Utilization Management Committee, the utilization review process indicated a secondary review on the above patient.  The review outcome is based on review of the medical records, discussions with staff, and applying clinical experience noted on the date of the review.        (x)      Inpatient Status Appropriate - This patient's medical care is consistent with medical management for inpatient care and reasonable inpatient medical practice.      () Observation Status Appropriate - This patient does not meet hospital inpatient criteria and is placed in observation status. If this patient's primary payer is Medicare and was admitted as an inpatient, Condition Code 44 should be used and patient status changed to \"observation\".   () Admission Status NOT Appropriate - This patient's medical care is not consistent with medical management for Inpatient or Observation Status.          RATIONALE FOR DETERMINATION   Debbie Boucher is a 77 year old female with past medical history significant for HTN, hypothyroidism, DGD, migraine headaches, hearing loss, MDD, Anxiety, prior gastric bypass surgery.  She presented to the emergency room with back pain after a fall.  She is found to have a T12 compression fracture.  Has required multiple doses of IV Dilaudid for pain control.  She is expected to require a greater than 2 midnight stay at the hospital.  Due to this patient's advanced age, complex past medical history, T12 compression fracture with need for multiple doses of IV Dilaudid, and expectation of a greater than 2 midnight stay at the hospital, it is appropriate to have her admitted to the hospital as an inpatient.      The severity of illness, intensity of service provided, expected LOS and risk for adverse outcome make the care complex, high risk and appropriate for hospital admission.        The " information on this document is developed by the utilization review team in order for the business office to ensure compliance.  This only denotes the appropriateness of proper admission status and does not reflect the quality of care rendered.         The definitions of Inpatient Status and Observation Status used in making the determination above are those provided in the CMS Coverage Manual, Chapter 1 and Chapter 6, section 70.4.      Sincerely,     Doron Liu D.O.  Utilization Review/ Case Management  [facility].

## 2023-04-05 NOTE — PLAN OF CARE
For vital signs and complete assessments, please see documentation flowsheets.     Pertinent assessments:   Pt AOx4, VSS on RA. C/o back pain, 5mg Oxycodone Q4h, sched Tylenol and PRN Atarax given with releif. [place], does not wear her HA's. On bedrest, repositioning as tolerated. Purewick in place. Poor appetite  No BM this shift, Miralax given  x 1    Major Shift Events:  Orthotics measured for back brace (ready by tomorrow afternoon)    Treatment Plan: Pain management, Neurosurgery consult, orthotics, PT/OT, bedrest.    Bedside Nurse:  Marley Beck RN

## 2023-04-05 NOTE — PHARMACY-ADMISSION MEDICATION HISTORY
Pharmacist Admission Medication History    Admission medication history is complete. The information provided in this note is only as accurate as the sources available at the time of the update.    Medication reconciliation/reorder completed by provider prior to medication history? No    Information Source(s): Patient and Family member via in-person    Pertinent Information: Patient takes all meds in AM except Trazodone    Changes made to PTA medication list:    Added: Fluoxetine, [place], Lisinopril, Omperazole,     Deleted: Amitriptyline, Venlafaxine, Hydroxyzine    Changed: Levothyroxine dose    Medication Affordability:  Not including over the counter (OTC) medications, was there a time in the past 12 months when you did not take your medications as prescribed because of cost?: No    Allergies reviewed with patient and updates made in EHR: yes    Medication History Completed By: Freddie Valentino RPH 4/4/2023 8:08 PM    PTA Med List   Medication Sig Last Dose     buPROPion (WELLBUTRIN XL) 150 MG 24 hr tablet Take 150 mg by mouth every morning 4/4/2023 at AM     FLUoxetine (PROZAC) 40 MG capsule Take 40 mg by mouth daily 4/4/2023 at AM     LEVOTHYROXINE SODIUM PO Take 75 mcg by mouth daily 4/4/2023 at AM     lisinopril (ZESTRIL) 20 MG tablet Take 20 mg by mouth daily 4/4/2023 at AM     multivitamin, therapeutic with minerals (THERA-VIT-M) TABS tablet Take 3 tablets by mouth 2 times daily Past Month     omeprazole (PRILOSEC) 40 MG DR capsule Take 40 mg by mouth daily 4/4/2023 at AM     Rizatriptan Benzoate (MAXALT PO) Take 10 mg by mouth once as needed for migraine More than a month     TRAZODONE HCL PO Take 100 mg by mouth At Bedtime  4/3/2023 at PM

## 2023-04-06 ENCOUNTER — APPOINTMENT (OUTPATIENT)
Dept: PHYSICAL THERAPY | Facility: CLINIC | Age: 78
DRG: 543 | End: 2023-04-06
Attending: PHYSICIAN ASSISTANT
Payer: MEDICARE

## 2023-04-06 ENCOUNTER — DOCUMENTATION ONLY (OUTPATIENT)
Dept: ORTHOPEDICS | Facility: CLINIC | Age: 78
End: 2023-04-06
Payer: MEDICARE

## 2023-04-06 ENCOUNTER — APPOINTMENT (OUTPATIENT)
Dept: OCCUPATIONAL THERAPY | Facility: CLINIC | Age: 78
DRG: 543 | End: 2023-04-06
Attending: PHYSICIAN ASSISTANT
Payer: MEDICARE

## 2023-04-06 PROCEDURE — L0486 TLSO RIGIDLINED CUST FAB TWO: HCPCS

## 2023-04-06 PROCEDURE — 97116 GAIT TRAINING THERAPY: CPT | Mod: GP | Performed by: PHYSICAL THERAPIST

## 2023-04-06 PROCEDURE — 250N000013 HC RX MED GY IP 250 OP 250 PS 637: Performed by: INTERNAL MEDICINE

## 2023-04-06 PROCEDURE — 97161 PT EVAL LOW COMPLEX 20 MIN: CPT | Mod: GP | Performed by: PHYSICAL THERAPIST

## 2023-04-06 PROCEDURE — 99232 SBSQ HOSP IP/OBS MODERATE 35: CPT | Performed by: INTERNAL MEDICINE

## 2023-04-06 PROCEDURE — 250N000013 HC RX MED GY IP 250 OP 250 PS 637: Performed by: PHYSICIAN ASSISTANT

## 2023-04-06 PROCEDURE — 97165 OT EVAL LOW COMPLEX 30 MIN: CPT | Mod: GO | Performed by: REHABILITATION PRACTITIONER

## 2023-04-06 PROCEDURE — 97530 THERAPEUTIC ACTIVITIES: CPT | Mod: GP | Performed by: PHYSICAL THERAPIST

## 2023-04-06 PROCEDURE — 250N000011 HC RX IP 250 OP 636: Performed by: PHYSICIAN ASSISTANT

## 2023-04-06 PROCEDURE — 120N000001 HC R&B MED SURG/OB

## 2023-04-06 PROCEDURE — 97535 SELF CARE MNGMENT TRAINING: CPT | Mod: GO | Performed by: REHABILITATION PRACTITIONER

## 2023-04-06 RX ORDER — NALOXONE HYDROCHLORIDE 0.4 MG/ML
0.2 INJECTION, SOLUTION INTRAMUSCULAR; INTRAVENOUS; SUBCUTANEOUS
Status: DISCONTINUED | OUTPATIENT
Start: 2023-04-06 | End: 2023-04-07 | Stop reason: HOSPADM

## 2023-04-06 RX ORDER — NALOXONE HYDROCHLORIDE 0.4 MG/ML
0.4 INJECTION, SOLUTION INTRAMUSCULAR; INTRAVENOUS; SUBCUTANEOUS
Status: DISCONTINUED | OUTPATIENT
Start: 2023-04-06 | End: 2023-04-07 | Stop reason: HOSPADM

## 2023-04-06 RX ORDER — GABAPENTIN 100 MG/1
200 CAPSULE ORAL 3 TIMES DAILY
Status: DISCONTINUED | OUTPATIENT
Start: 2023-04-06 | End: 2023-04-07 | Stop reason: HOSPADM

## 2023-04-06 RX ORDER — LIDOCAINE 4 G/G
1 PATCH TOPICAL EVERY EVENING
Status: DISCONTINUED | OUTPATIENT
Start: 2023-04-06 | End: 2023-04-07 | Stop reason: HOSPADM

## 2023-04-06 RX ADMIN — ACETAMINOPHEN 975 MG: 325 TABLET, FILM COATED ORAL at 20:01

## 2023-04-06 RX ADMIN — ACETAMINOPHEN 975 MG: 325 TABLET, FILM COATED ORAL at 12:15

## 2023-04-06 RX ADMIN — GABAPENTIN 200 MG: 100 CAPSULE ORAL at 16:34

## 2023-04-06 RX ADMIN — GABAPENTIN 200 MG: 100 CAPSULE ORAL at 21:37

## 2023-04-06 RX ADMIN — ACETAMINOPHEN 975 MG: 325 TABLET, FILM COATED ORAL at 05:18

## 2023-04-06 RX ADMIN — MULTIPLE VITAMINS W/ MINERALS TAB 3 TABLET: TAB at 08:09

## 2023-04-06 RX ADMIN — BUPROPION HYDROCHLORIDE 150 MG: 150 TABLET, FILM COATED, EXTENDED RELEASE ORAL at 08:09

## 2023-04-06 RX ADMIN — OXYCODONE HYDROCHLORIDE 5 MG: 5 TABLET ORAL at 02:47

## 2023-04-06 RX ADMIN — OXYCODONE HYDROCHLORIDE 5 MG: 5 TABLET ORAL at 06:55

## 2023-04-06 RX ADMIN — ENOXAPARIN SODIUM 30 MG: 30 INJECTION SUBCUTANEOUS at 21:37

## 2023-04-06 RX ADMIN — FLUOXETINE 40 MG: 20 CAPSULE ORAL at 08:09

## 2023-04-06 RX ADMIN — POLYETHYLENE GLYCOL 3350 17 G: 17 POWDER, FOR SOLUTION ORAL at 08:11

## 2023-04-06 RX ADMIN — MULTIPLE VITAMINS W/ MINERALS TAB 3 TABLET: TAB at 16:34

## 2023-04-06 RX ADMIN — LEVOTHYROXINE SODIUM 75 MCG: 0.07 TABLET ORAL at 08:09

## 2023-04-06 RX ADMIN — LISINOPRIL 20 MG: 20 TABLET ORAL at 08:09

## 2023-04-06 RX ADMIN — GABAPENTIN 200 MG: 100 CAPSULE ORAL at 10:18

## 2023-04-06 RX ADMIN — OXYCODONE HYDROCHLORIDE 5 MG: 5 TABLET ORAL at 12:16

## 2023-04-06 RX ADMIN — OXYCODONE HYDROCHLORIDE 5 MG: 5 TABLET ORAL at 16:34

## 2023-04-06 ASSESSMENT — ACTIVITIES OF DAILY LIVING (ADL)
ADLS_ACUITY_SCORE: 48
ADLS_ACUITY_SCORE: 44
ADLS_ACUITY_SCORE: 48
ADLS_ACUITY_SCORE: 48
ADLS_ACUITY_SCORE: 44
ADLS_ACUITY_SCORE: 44
ADLS_ACUITY_SCORE: 48
ADLS_ACUITY_SCORE: 44
ADLS_ACUITY_SCORE: 45
ADLS_ACUITY_SCORE: 44

## 2023-04-06 NOTE — PROGRESS NOTES
S: Debbie Boucher was seen at [facility], Room 545-01, for the fit and delivery of a [employer] TLSO.  The patient s Nurse was present and assisted with log rolling the patient.    Dx: Acute T12 burst fracture with approximately 25% vertebral body height loss and 3 mm retropulsion of the posterior superior cortex. No posttraumatic subluxation. Hx of Osteoporosis.    O:  The TLSO was fit to the patient.  The brace was a great fit.  The patient was impressed how light weight and comfortable the brace was to wear.      A: I explained the donning and doffing protocol to the patient.  I m including all the donning/doffing instructions at the end of this note and there is a printed copy in the patient s TLSO clear bag.    P:  The TLSO should be worn according to the Physician s instructions.  Please contact the Orthotics & Prosthetic Office if you have any questions.  Thank you, Baltazar PRADHAN: The TLSO increases medial-lateral and anterior-posterior stability of the spine.  The TLSO also applies compression to the patient s soft tissues.  Compression of the patient s soft tissues serves to unload the injured vertebrae which reduces pain and promotes healing.  The goal of this orthosis is to provide spinal stability, promote healing and reduce pain while the patient performs their ADLs.    Electronically signed by Baltazar Garcia CPO, LPO    How to Put on a TLSO Back Brace  For optimal fit brace should NOT be donned with patient sitting. Ideal fit is achieved by donning in either laying or standing position. Due to changes in belly tissue, it is difficult to achieve a proper fit when patient is sitting.  1. Apply a snug-fitting cotton t-shirt.  Loose shirts may cause wrinkles and skin irritation.  2. Patient should be supine. Palpate for waist (below ribs but above hips) so you know where to line up waist grooves of the brace.  3. Logroll patient and slide back section of brace under patient lining up waist groove of  brace with patient's waist.  4. Roll patient onto their back with the posterior section behind them. Recheck alignment of waist groove on brace with patient's waist. It may be necessary to logroll patient to the opposite side to get posterior section centered on patient, where it extends anteriorly equal amounts on patient's sides. Repeat logrolling side to side as necessary.  5. Once posterior section is positioned correctly, lay anterior section on patient. Again, line up waist groove of brace to patient's waist. Waist grooves of anterior and posterior section should match up and fit together. Anterior shell should go over the top of posterior shell. Velcro straps should line up with the chafes/D-rings, if they don't, either the anterior or the posterior sections are not in the proper place.  6. Fasten the middle straps first and tighten both sides evenly. Then fasten either top or bottom straps in the same manner. Brace should be snug so as to prevent brace from sliding up on patient. If it is too loose, the brace will slide up and cause discomfort to the patient in the chin and/or axilla area.  7. When properly fit, brace the inferior aspect should allow clearance so as not to cut into the tops of the thighs when sitting but needs to be as low on the pelvic area as possible.    Wearing Schedule  Always check with prescribing doctor for a precise wearing schedule. At times the TLSO is worn only when out of bed while sitting or standing. Other times, the doctor requires the TLSO to be worn at all times.    Cleaning and Maintenance  Rubbing alcohol may be used to clean the inside and outside of the TLSO. Spray TLSO with alcohol and wipe gently with a cloth. Be sure that TLSO is completely dry prior to application to ensure no skin issues will occur. Always wear a clean, dry, snug-fitting shirt under the brace.    Tips and Problem Solving  Brace migration is inevitable, especially when patient is going from laying  to upright in bed. The mattress may push posterior section of brace up, which will push the whole brace up. Migration may also occur when patient sits in a soft cushioned chair. Don't be afraid to readjust brace and pull it down and back to its proper position.    Avoid soft seat cushioned chairs and sit up straight. Soft seat cushions or leaning back into a chair will cause the brace to migrate upward and may place pressure underarms.      Instead of leaning back, try sitting on the front half of the seat of the chair and work your way back until you contact the backrest of the chair.  Sometimes using a pillow in the gap between the back of the chair and the brace will provide more support.    Do not lean forward over a table while eating. Bring the food up to the mouth. This will reduce any pressure on the thighs and chest.    If the TLSO starts to migrate upward under the throat/armpits, ensure that straps are snug. Straps that are loose will allow the brace to shift.

## 2023-04-06 NOTE — PROGRESS NOTES
04/06/23 1500   Appointment Info   Signing Clinician's Name / Credentials (OT) Amber Rosas, OTR/L   Rehab Comments (OT) TLSO, spine precautions, [place] per dtr, patient tends not let people know if she can not hear them.   Living Environment   People in Home alone   Living Environment Comments condo, 1 step to enter, 1 level home. uses walk in shower.   Self-Care   Usual Activity Tolerance moderate   Current Activity Tolerance fair   Regular Exercise No   Equipment Currently Used at Home   (has fww, does not use)   Fall history within last six months yes   Number of times patient has fallen within last six months 2  (dtr reports frequent new misses d/t unsteadiness)   Activity/Exercise/Self-Care Comment Patient reports she is I with ADls, has dtr and son close by to A with heavy lifting or needs. Has housekeeping 1x/month and MOW. Dtr reports patient has poor eating habits, forgets to eat (later clarified, patient had gastrci bypass, drinvk Pepsi all day long which fills her up so she doesn't feel the need to eat). Dtr reports henny sleeps 18 hours a day and had limited activity   General Information   Onset of Illness/Injury or Date of Surgery 04/04/23   Referring Physician Maribell Hernandez PA-C   Patient/Family Therapy Goal Statement (OT) henny agrees to TCU   Additional Occupational Profile Info/Pertinent History of Current Problem Debbie Boucher is a 77 year old female with past medical history significant for HTN, hypothyroidism, DGD, migraine headaches, hearing loss, MDD, Anxiety, prior gastric bypass surgery, who was admitted on 4/4/2023 with T12 compression fracture. Essentially, prior to admission Lost balance cleaning cat litter box fell backwards with immediate mid to low back pain. Advanced imaging was performed CT thoracic and lumbar spine, pelvis, chest and abdomen.  Found to have acute fracture of the superior endplate T12 with 30% loss of height and 2 mm of retropulsion without additional  fractures.  Osteopenic bones.  Neurosurgery was contacted from the emergency department who recommended bedrest until TLSO. No surgical plans were currently recommended. Reportedly balance issues have been a concern and may have led to fall, and it is possible she will require TCU placement.   Existing Precautions/Restrictions fall;spinal  (TLSO)   Limitations/Impairments hearing  ([place] per dtr, patient tends not let people know if she can not hear them.)   General Observations and Info patient in bed with TLSO donned, dtr present, agreed to OT session   Cognitive Status Examination   Orientation Status orientation to person, place and time   Follows Commands 75-90% accuracy;physical/tactile prompts required;repetition of directions required   Safety Deficit impulsivity;insight into deficits/self-awareness;judgment;problem-solving   Cognitive Status Comments suspect cognitive deficits, spoke with dtr who agreed to cog screen. Reports pt went to [place] to visit luis for 3 weeks, family expressed concerns to dtr about patient living alone, Dtr more concerned with balance, but agrees with cog screen   Pain Assessment   Patient Currently in Pain No   Posture   Posture not impaired   Range of Motion Comprehensive   General Range of Motion bilateral upper extremity ROM WFL   Strength Comprehensive (MMT)   Comment, General Manual Muscle Testing (MMT) Assessment general strength intact, fatigues after short walk with PT   Muscle Tone Assessment   Muscle Tone Quick Adds No deficits were identified   Muscle Tone Comments decreased muscle mass   Coordination   Upper Extremity Coordination No deficits were identified   Bed Mobility   Comment (Bed Mobility) CGA, with vc's   Transfers   Transfer Comments CGA, vc's, fww sit<>stand   Balance   Balance Comments decreased balance noted, defer to PT for balance assessment   Activities of Daily Living   BADL Assessment/Intervention lower body dressing;toileting;upper body dressing    Upper Body Dressing Assessment/Training   [place] Level (Upper Body Dressing) minimum assist (75% patient effort)   Lower Body Dressing Assessment/Training   [place] Level (Lower Body Dressing) minimum assist (75% patient effort)   Toileting   [place] Level (Toileting) minimum assist (75% patient effort)   Clinical Impression   Criteria for Skilled Therapeutic Interventions Met (OT) Yes, treatment indicated   OT Diagnosis decreased ADL/IADls   OT Problem List-Impairments impacting ADL activity tolerance impaired;balance;cognition;mobility;strength;post-surgical precautions   Assessment of Occupational Performance 5 or more Performance Deficits   Identified Performance Deficits dsg, toileting, bathing, functional/community mobility, household chores, driving, errands   Planned Therapy Interventions (OT) ADL retraining;progressive activity/exercise;transfer training;strengthening   Clinical Decision Making Complexity (OT) low complexity   Risk & Benefits of therapy have been explained evaluation/treatment results reviewed;care plan/treatment goals reviewed;risks/benefits reviewed;participants voiced agreement with care plan;patient;daughter   OT Total Evaluation Time   OT Eval, Low Complexity Minutes (79765) 10   OT Goals   Therapy Frequency (OT) 5 times/wk   OT Predicted Duration/Target Date for Goal Attainment 04/13/23   OT Goals Upper Body Dressing;Lower Body Dressing;Toilet Transfer/Toileting;Hygiene/Grooming;Cognition   OT: Hygiene/Grooming supervision/stand-by assist;while standing;within precautions  (to complete 2-3 tasks)   OT: Upper Body Dressing Moderate assist;including orthotic;within precautions   OT: Lower Body Dressing using adaptive equipment;within precautions  (CGA)   OT: Toilet Transfer/Toileting toilet transfer;cleaning and garment management;using adaptive equipment;within precautions  (CGA)   OT: Cognitive Patient/caregiver will verbalize understanding of cognitive assessment  results/recommendations as needed for safe discharge planning   OT Discharge Planning   OT Plan ACE-III or M-ACE, g/h sinkside, Tb dressing with TLSO, AE for LE dsg, toileting   OT Discharge Recommendation (DC Rec) Transitional Care Facility   OT Rationale for DC Rec patietn is below baseline for ADL and functional mobility, now requried to wear TLSO and use spine precautions. Has been having worsening balance concerns. Would benefit from OT in IP andTCU setting to maximize ADl and mobility indp for safe return home.   OT Brief overview of current status CGA for, bed mobility, sit<>stand, fww and CGA to min A for functional mobility   Total Session Time   Total Session Time (sum of timed and untimed services) 10

## 2023-04-06 NOTE — PROGRESS NOTES
End of Shift Summary  For vital signs and complete assessments, please see documentation flowsheets.     Pertinent assessments: AxOx4. VSS on RA. Bedrest per ortho until TLSO brace is ready. Pt endorsing back pain, scheduled Tylenol and PRN Oxycodone x3 given w/ relief noted. Purewick was not working for pt, removed before pt fell asleep; pt prefers brief until able to ambulate to bathroom. Denies n/v/SOB/chest pain. PIV SL.     Major Shift Events:  none    Treatment Plan: Pain management, Neurosurgery consult, orthotics, PT/OT/SW, bedrest until TLSO ready.    Bedside Nurse: Trice Sinha RN

## 2023-04-06 NOTE — PROGRESS NOTES
[facility]  Hospitalist Progress Note  Tony Chery MD 04/05/23    Reason for Stay (Diagnosis): Fall with T12 fracture         Assessment and Plan:      Summary of Stay: Debbie Boucher is a 77 year old female with past medical history significant for HTN, hypothyroidism, DGD, migraine headaches, hearing loss, MDD, Anxiety, prior gastric bypass surgery, who was admitted on 4/4/2023 with T12 compression fracture.    Essentially, prior to admission Lost balance cleaning cat litter box fell backwards with immediate mid to low back pain.  Neurologically intact.  No head injury or LOC.  Advanced imaging was performed CT thoracic and lumbar spine, pelvis, chest and abdomen.  Found to have acute fracture of the superior endplate T12 with 30% loss of height and 2 mm of retropulsion without additional fractures.  Osteopenic bones.  Neurosurgery was contacted from the emergency department who recommended bedrest until TLSO. No surgical plans were currently recommended.  Reportedly balance issues have been a concern and may have led to fall, and it is possible she will require TCU placement.       Fall suspected mechanical  T12 compression fracture  DGD  Osteoporosis   -bedrest until brace is placed, further weightbearing and activity restrictions per NSG  -NSG consult  -Analgesia PRN: Scheduled Tylenol, as needed Toradol, as needed oxycodone oral and as needed IV Dilaudid  -adding lidoderm patch, gabapentin  -Anticipate will need increased services versus TCU, PT, OT for balance and social work consult   -orthotics for TLSO brace     Incidental Significant biliary dilatation: Apparently this is chronic and noted on a CT scan as far back as 2012.  --Given absence of abdominal symptoms or LFT abnormalities we will hold off on further evaluation for now.    Murmur   Asymptomatic, no symptoms of CHF does have a systolic murmur on exam which is not previously reported or documented. No recent  "echocardiogram.  -if there are any surgical plans, obtain echo while admitted otherwise outpatient     Lung, Pleural Scarring  Noted on CT after initial evaluation of patient.  CT scan demonstrates moderate biapical pleural-parenchymal scarring and intralobular septal thickening bilaterally that may represent early fibrosis.  - monitor resp symptoms  - consider outpatient Pulmonology follow up, PFTs etc.      Hypertension  - resume home BP meds with parameters      History of gastric bypass surgery  - Weight 42.2 kg on admit, 44.6 last April 2022 per chart review.   - monitor weight, intake. Consider nutrition consult or at TCU      Hearing Loss   -Patient is quite hard of hearing without hearing aids in and there is concern from family about where that she is understanding questions are discussing medical plans.  Family, daughter would like to be involved with discussions about care plan etc.  -Pocket talker as needed     LEXIE  MDD  Migraine, Headache   - resume PRN medications           Diet:  Regular diet      DVT Prophylaxis: Enoxaparin (Lovenox) SQ  Perez Catheter: Not present  Cardiac Monitoring: None  Code Status:   Full code discussed with the patient on admission, she would not like long-term life support or exhaustive efforts if outcome is expected to be poor.   Disposition: Likely here 1 to 2 days more than either home with services or TCU depending on needs.    Clinically Significant Risk Factors                         # Cachexia: Estimated body mass index is 17.59 kg/m  as calculated from the following:    Height as of this encounter: 1.549 m (5' 1\").    Weight as of this encounter: 42.2 kg (93 lb 1.6 oz)., PRESENT ON ADMISSION               Interval History (Subjective):      Adding gabapentin and Lidoderm to help with pain control  Was fit for a brace, reportedly that should arrive this afternoon and then she can start to mobilize with therapy  No other new issues otherwise           Physical Exam:    " "  Last Vital Signs:  BP (!) 166/67 (BP Location: Left arm)   Pulse 64   Temp 98.5  F (36.9  C) (Oral)   Resp 20   Ht 1.549 m (5' 1\")   Wt 42.2 kg (93 lb 1.6 oz)   SpO2 95%   BMI 17.59 kg/m        Intake/Output Summary (Last 24 hours) at 4/5/2023 1139  Last data filed at 4/5/2023 0556  Gross per 24 hour   Intake --   Output 250 ml   Net -250 ml       General: Alert, awake, no acute distress.  Thin woman lying in bed.  HEENT: NC/AT, eyes anicteric, external occular movements intact, face symmetric.  Cardiac: RRR, S1, S2.  No murmurs appreciated.  Pulmonary: Normal chest rise, normal work of breathing.  Lungs CTA BL  Abdomen: soft, non-tender, non-distended.   Extremities: no deformities.  Warm, well perfused.  Skin: no rashes or lesions noted.  Warm and Dry.  Neuro: No focal deficits noted.  Speech clear.    Psych: Appropriate affect.         Medications:      All current medications were reviewed with changes reflected in problem list.         Data:      All new lab and imaging data was reviewed.   Labs:  Recent Labs   Lab 04/05/23  0742      POTASSIUM 4.0   CHLORIDE 103   CO2 29   ANIONGAP 6*   GLC 97   BUN 17.1   CR 0.70   GFRESTIMATED 89   NAMAN 8.7*     Recent Labs   Lab 04/05/23  0742   WBC 4.4   HGB 11.2*   HCT 34.6*   *         Imaging:   Recent Results (from the past 48 hour(s))   Lumbar spine CT w/o contrast    Narrative    EXAM: CT LUMBAR SPINE W/O CONTRAST  LOCATION: [facility]  DATE/TIME: 4/4/2023 5:12 PM    INDICATION: Fall, back injury  COMPARISON: None.  TECHNIQUE: Routine CT Lumbar Spine without IV contrast. Multiplanar reformats. Dose reduction techniques were used.     FINDINGS:  VERTEBRA: Acute fracture superior endplate of T12 with 30% loss of height, 2 mm retropulsion. No additional fractures. Grade 1 anterolisthesis L4-L5. Minor dextrocurvature. Osteopenic bones.     CANAL/FORAMINA: No canal or neural foraminal stenosis.    PARASPINAL: No " extraspinal abnormality.      Impression    IMPRESSION:  1.  Acute fracture superior endplate of T12 with slight loss of height, minimal retropulsion.   CT Pelvis Bone wo Contrast    Narrative    EXAM: CT PELVIS BONE WO CONTRAST  LOCATION: [facility]  DATE/TIME: 4/4/2023 5:13 PM    INDICATION: Trauma, fall, pain.  COMPARISON: None.  TECHNIQUE: CT scan of the pelvis was performed without IV contrast. Multiplanar reformats were obtained. Dose reduction techniques were used.  CONTRAST: None.    FINDINGS:    PELVIS ORGANS: Vascular calcifications are noted. There is thinning of the anterior abdominal wall musculature, partially imaged.    MUSCULOSKELETAL: There is diffuse severe osseous demineralization, which decreases the CT sensitivity for the detection of osseous pathology.    There are mild degenerative changes in both hips. No acute fracture or dislocation is identified.      Impression    IMPRESSION:  1.  No acute fracture is identified. If there is persistent concern, MRI would be more sensitive given the patient's degree of osseous demineralization.  2.  Mild degenerative changes in both hips.  3.  The lumbar spine is better evaluated on the separately dictated CT lumbar spine from the same day, please see that report for details.   CT Chest Abodmen w/o Contrast    Narrative    EXAM: CT CHEST ABDOMEN W/O CONTRAST  LOCATION: [facility]  DATE/TIME: 4/4/2023 6:34 PM    INDICATION: Trauma, upper abdominal pain. contrast allergy  COMPARISON: None.  TECHNIQUE: CT scan of the chest and abdomen was performed without IV contrast. Multiplanar reformats were obtained. Dose reduction techniques were used. Lack of intravenous contrast limits sensitivity and specificity.   CONTRAST: None.    FINDINGS:   LUNGS AND PLEURA: Moderate biapical pleural-parenchymal scarring. Subpleural intralobular septal thickening bilaterally without honeycombing may represent early fibrosis, right  greater than left. Mild right lower lobe traction bronchiectasis. No   pneumothorax, lobar consolidation, or effusion.    MEDIASTINUM/AXILLAE: No mediastinal hematoma. Calcifications in the region of the aortic valve may be seen in setting of stenosis. Small hiatal hernia.    CORONARY ARTERY CALCIFICATION: Severe.    HEPATOBILIARY: Significant biliary dilatation is greater than would be expected for postcholecystectomy state. The common bile duct measures up to 18 mm in diameter.    PANCREAS: Unremarkable    SPLEEN: Unremarkable    ADRENAL GLANDS: Bilateral adrenal hyperplasia, left greater than right.    KIDNEYS/BLADDER: No hydronephrosis or perinephric collections.    BOWEL: Prior gastric surgery with small bowel anastomotic suture line in the left midabdomen.    LYMPH NODES: No upper abdominal adenopathy    VASCULATURE: No abdominal aortic aneurysm. Scattered atherosclerotic plaque.    MUSCULOSKELETAL: Previous ventral hernia repair with mesh in place. Bilateral breast implants. Anterior left seventh rib sclerotic region may represent a bone island. Mild thoracic scoliosis. Less than 50% T12 compression fracture. Generalized   osteopenia.      Impression    IMPRESSION:  1.  T12 compression fracture. Please see accompanying thoracic spine CT report.  2.  Significant common duct dilatation. Short-term follow-up MRCP (with and without IV contrast) recommended if prior studies are unavailable to document stability.   CT Thoracic Spine w/o Contrast    Narrative    EXAM: CT THORACIC SPINE W/O CONTRAST  LOCATION: [facility]  DATE/TIME: 4/4/2023 6:34 PM    INDICATION: Trauma  COMPARISON: None.  TECHNIQUE: Routine CT Thoracic Spine without IV contrast. Multiplanar reformats. Dose reduction techniques were used.     FINDINGS:  VERTEBRA:  Acute T12 burst fracture with approximately 25% vertebral body height loss and 3 mm retropulsion of the posterior superior cortex. No posttraumatic subluxation.      CANAL/FORAMINA: No significant canal or neural foraminal stenosis.    PARASPINAL:  Very mild prevertebral edema at the level of the fracture. The thyroid glands appear atrophic.      Impression    IMPRESSION:  1.  Acute T12 burst fracture with mild height loss and minimal retropulsion.   2.  No high-grade canal stenosis or traumatic subluxation.         Tony Chery MD .    I've spent 40 minutes in chart review, ordering medications and tests, obtaining additional history as needed, evaluating the patient and in documentation for this encounter.

## 2023-04-06 NOTE — PLAN OF CARE
To Do:  End of Shift Summary  For vital signs and complete assessments, please see documentation flowsheets.     Pertinent assessments: AxOx4. VSS on RA. Pt endorsing back pain, scheduled Tylenol and PRN Oxycodone given w/ relief noted. pt prefers brief instead of purewick. Denies n/v/SOB/chest pain. PIV SL.     Major Shift Events: TLSO brace delivered. OT attempted ambulation.     Treatment Plan: Pain management, Neurosurgery consult, orthotics, PT/OT/SW    Bedside Nurse: Kat Darby RN

## 2023-04-06 NOTE — PROGRESS NOTES
04/06/23 1420   Appointment Info   Signing Clinician's Name / Credentials (PT) Kanwal Serrano, PT   Living Environment   People in Home alone   Current Living Arrangements condominium   Living Environment Comments per chart: condo, 1 step to enter, 1 level home. uses walk in shower.   Self-Care   Usual Activity Tolerance moderate   Current Activity Tolerance fair   Regular Exercise No   Equipment Currently Used at Home none  (has 2WW but hasn't been using it)   Fall history within last six months yes   Number of times patient has fallen within last six months 2   Activity/Exercise/Self-Care Comment per chart: Patient reports she is I with ADls, has dtr and son close by to A with heavy lifting or needs. Has housekeeping 1x/month and MOW. Dtr reports patient has poor eating habits, forgets to eat (later clarified, patient had gastrci bypass, drinvk Pepsi all day long which fills her up so she doesn't feel the need to eat). Dtr reports johannaetn sleeps 18 hours a day and had limited activity   General Information   Onset of Illness/Injury or Date of Surgery 04/04/23   Referring Physician Maribell Hernandez PA-C   Patient/Family Therapy Goals Statement (PT) not stated   Pertinent History of Current Problem (include personal factors and/or comorbidities that impact the POC) per chart: Debbie Boucher is a 77 year old female with past medical history significant for HTN, hypothyroidism, DGD, migraine headaches, hearing loss, MDD, Anxiety, prior gastric bypass surgery, who was admitted on 4/4/2023 with T12 compression fracture. Essentially, prior to admission Lost balance cleaning cat litter box fell backwards with immediate mid to low back pain. Advanced imaging was performed CT thoracic and lumbar spine, pelvis, chest and abdomen.  Found to have acute fracture of the superior endplate T12 with 30% loss of height and 2 mm of retropulsion without additional fractures.  Osteopenic bones.  Neurosurgery was contacted from  the emergency department who recommended bedrest until TLSO. No surgical plans were currently recommended. Reportedly balance issues have been a concern and may have led to fall, and it is possible she will require TCU placement; see medical record for further information   Existing Precautions/Restrictions fall;lifting;spinal   General Observations Patient in bed; wearing TLSO; daughter present for session   Cognition   Safety Deficit (Cognition) impulsivity;insight into deficits/self-awareness;judgment;safety precautions awareness;safety precautions follow-through/compliance   Cognitive Status Comments appears to have decreased insight; impairments noted; defer to OT for further testing as needed   Pain Assessment   Patient Currently in Pain Yes, see Vital Sign flowsheet  (back pain and R hip pain reported)   Posture    Posture Comments leans R in sitting at times   Range of Motion (ROM)   ROM Comment WFL; trunk Limited by spinal precautions   Strength (Manual Muscle Testing)   Strength Comments functional weakness noted with mobility; currently requiring assist   Bed Mobility   Comment, (Bed Mobility) Min A for supine to sit with logroll   Transfers   Comment, (Transfers) min A and walker and TLSO   Gait/Stairs (Locomotion)   Comment, (Gait/Stairs) min A, walker and TLSO   Balance   Balance Comments impaired; current need for walker and assist   Clinical Impression   Criteria for Skilled Therapeutic Intervention Yes, treatment indicated   PT Diagnosis (PT) impaired functional mobility   Influenced by the following impairments pain; weakness; spinal precautions; lifting restrictions;impaired balance; impaired safety; decreased insight into deficits   Functional limitations due to impairments impaired independence with mobility and cares secondary to above deficits   Clinical Presentation (PT Evaluation Complexity) Stable/Uncomplicated   Clinical Presentation Rationale clinical judgement; level of assist   Clinical  Decision Making (Complexity) low complexity   Planned Therapy Interventions (PT) bed mobility training;gait training;strengthening;transfer training;progressive activity/exercise   Anticipated Equipment Needs at Discharge (PT) walker, rolling   Risk & Benefits of therapy have been explained evaluation/treatment results reviewed;care plan/treatment goals reviewed;risks/benefits reviewed;current/potential barriers reviewed;participants voiced agreement with care plan;participants included;patient   PT Total Evaluation Time   PT Eval, Low Complexity Minutes (93822) 10   Physical Therapy Goals   PT Frequency 5x/week   PT Predicted Duration/Target Date for Goal Attainment 04/11/23   PT Goals Bed Mobility;Transfers;Gait   PT Discharge Planning   PT Discharge Recommendation (DC Rec) Transitional Care Facility   PT Rationale for DC Rec Patient below baseline level of function and lives alone; doesn't appear she would  be able to manage mobility and cares independtly at this time; patient would benefit from TCU to maximize return to independent function prior to return home alone; doesn't appear safe to return home in current condition without 24/7 assist   PT Brief overview of current status A x 1 with walker; falls risk

## 2023-04-07 ENCOUNTER — LAB REQUISITION (OUTPATIENT)
Dept: LAB | Facility: CLINIC | Age: 78
End: 2023-04-07
Payer: MEDICARE

## 2023-04-07 VITALS
OXYGEN SATURATION: 96 % | TEMPERATURE: 98.2 F | SYSTOLIC BLOOD PRESSURE: 152 MMHG | RESPIRATION RATE: 20 BRPM | HEART RATE: 64 BPM | HEIGHT: 61 IN | WEIGHT: 93.1 LBS | BODY MASS INDEX: 17.58 KG/M2 | DIASTOLIC BLOOD PRESSURE: 64 MMHG

## 2023-04-07 DIAGNOSIS — S22.081A BURST FRACTURE OF T12 VERTEBRA (H): Primary | ICD-10-CM

## 2023-04-07 DIAGNOSIS — Z11.1 ENCOUNTER FOR SCREENING FOR RESPIRATORY TUBERCULOSIS: ICD-10-CM

## 2023-04-07 LAB — PLATELET # BLD AUTO: 202 10E3/UL (ref 150–450)

## 2023-04-07 PROCEDURE — 85049 AUTOMATED PLATELET COUNT: CPT | Performed by: PHYSICIAN ASSISTANT

## 2023-04-07 PROCEDURE — 250N000013 HC RX MED GY IP 250 OP 250 PS 637: Performed by: INTERNAL MEDICINE

## 2023-04-07 PROCEDURE — 99239 HOSP IP/OBS DSCHRG MGMT >30: CPT | Performed by: INTERNAL MEDICINE

## 2023-04-07 PROCEDURE — 36415 COLL VENOUS BLD VENIPUNCTURE: CPT | Performed by: PHYSICIAN ASSISTANT

## 2023-04-07 PROCEDURE — 250N000013 HC RX MED GY IP 250 OP 250 PS 637: Performed by: PHYSICIAN ASSISTANT

## 2023-04-07 RX ORDER — ACETAMINOPHEN 325 MG/1
650 TABLET ORAL EVERY 6 HOURS PRN
DISCHARGE
Start: 2023-04-07 | End: 2023-04-10

## 2023-04-07 RX ORDER — OXYCODONE HYDROCHLORIDE 5 MG/1
5 TABLET ORAL EVERY 4 HOURS PRN
Qty: 10 TABLET | Refills: 0 | Status: SHIPPED | DISCHARGE
Start: 2023-04-07 | End: 2023-04-13

## 2023-04-07 RX ORDER — ACETAMINOPHEN 325 MG/1
975 TABLET ORAL EVERY 8 HOURS
DISCHARGE
Start: 2023-04-07 | End: 2023-04-14

## 2023-04-07 RX ORDER — POLYETHYLENE GLYCOL 3350 17 G/17G
17 POWDER, FOR SOLUTION ORAL 2 TIMES DAILY PRN
Qty: 510 G | DISCHARGE
Start: 2023-04-07 | End: 2023-04-20

## 2023-04-07 RX ORDER — GABAPENTIN 100 MG/1
200 CAPSULE ORAL 3 TIMES DAILY
DISCHARGE
Start: 2023-04-07 | End: 2023-04-14

## 2023-04-07 RX ORDER — MULTIPLE VITAMINS W/ MINERALS TAB 9MG-400MCG
3 TAB ORAL DAILY
Status: DISCONTINUED | OUTPATIENT
Start: 2023-04-08 | End: 2023-04-07 | Stop reason: HOSPADM

## 2023-04-07 RX ORDER — OXYCODONE HYDROCHLORIDE 5 MG/1
2.5 TABLET ORAL EVERY 4 HOURS PRN
Qty: 10 TABLET | Refills: 0 | Status: SHIPPED | DISCHARGE
Start: 2023-04-07 | End: 2023-04-13

## 2023-04-07 RX ORDER — HYDROXYZINE HYDROCHLORIDE 25 MG/1
25 TABLET, FILM COATED ORAL EVERY 6 HOURS PRN
DISCHARGE
Start: 2023-04-07 | End: 2023-04-13

## 2023-04-07 RX ORDER — MULTIPLE VITAMINS W/ MINERALS TAB 9MG-400MCG
1 TAB ORAL DAILY
DISCHARGE
Start: 2023-04-07

## 2023-04-07 RX ADMIN — LISINOPRIL 20 MG: 20 TABLET ORAL at 08:51

## 2023-04-07 RX ADMIN — OXYCODONE HYDROCHLORIDE 5 MG: 5 TABLET ORAL at 15:13

## 2023-04-07 RX ADMIN — ACETAMINOPHEN 975 MG: 325 TABLET, FILM COATED ORAL at 13:16

## 2023-04-07 RX ADMIN — GABAPENTIN 200 MG: 100 CAPSULE ORAL at 08:51

## 2023-04-07 RX ADMIN — MULTIPLE VITAMINS W/ MINERALS TAB 1 TABLET: TAB at 08:50

## 2023-04-07 RX ADMIN — BUPROPION HYDROCHLORIDE 150 MG: 150 TABLET, FILM COATED, EXTENDED RELEASE ORAL at 08:51

## 2023-04-07 RX ADMIN — ACETAMINOPHEN 975 MG: 325 TABLET, FILM COATED ORAL at 04:55

## 2023-04-07 RX ADMIN — FLUOXETINE 40 MG: 20 CAPSULE ORAL at 08:51

## 2023-04-07 RX ADMIN — OXYCODONE HYDROCHLORIDE 5 MG: 5 TABLET ORAL at 02:20

## 2023-04-07 RX ADMIN — LEVOTHYROXINE SODIUM 75 MCG: 0.07 TABLET ORAL at 08:51

## 2023-04-07 RX ADMIN — OXYCODONE HYDROCHLORIDE 5 MG: 5 TABLET ORAL at 11:14

## 2023-04-07 ASSESSMENT — ACTIVITIES OF DAILY LIVING (ADL)
ADLS_ACUITY_SCORE: 45
ADLS_ACUITY_SCORE: 47

## 2023-04-07 NOTE — PROGRESS NOTES
Care Management Follow Up    Length of Stay (days): 3    Expected Discharge Date: 04/07/2023     Concerns to be Addressed:       Patient plan of care discussed at interdisciplinary rounds: Yes    Anticipated Discharge Disposition: Transitional Care     Anticipated Discharge Services:    Anticipated Discharge DME:      Patient/family educated on Medicare website which has current facility and service quality ratings:    Education Provided on the Discharge Plan:    Patient/Family in Agreement with the Plan:      Referrals Placed by CM/SW:    Private pay costs discussed: Not applicable    Additional Information:  SW met with patient & her son to discuss the recommendation for a TCU at discharge. Patient is agreeable to TCU placement & deferred to family to make the decision for where to place referrals. Patient's son requested referrals be sent to [facility] & [facility]. Referrals pending at this time. SW will continue to follow to assist with discharge planning prn.       DASHA Mckeon updated patient & her son that patient has been accepted at [facility]. Patient is agreeable to placement & son confirmed family will provide transportation today. He voiced they will plan to arrive at [facility] at 4:30 PM & leave the hospital after 3:45 PM. BRYAN updated [facility] admissions who was agreeable with the plan & patient's RN.     DASHA Mckeon  [facility]  4/7/2023  2:14 PM

## 2023-04-07 NOTE — PROGRESS NOTES
Assessments: AxOx4. VSS on RA. Ax1 w/ TLSO and walker. Back pain managed w/ scheduled Tylenol overnight. Denies n/v/SOB/chest pain. Voiding adequately. PIV SL.     Treatment Plan: conservative treatment, pain management, pending TCU placement    Bedside Nurse: Trice Sinha RN

## 2023-04-07 NOTE — PROGRESS NOTES
[facility]  Hospitalist Progress Note  Tony Chery MD 04/07/2023      Reason for Stay (Diagnosis): Fall with T12 fracture         Assessment and Plan:      Summary of Stay: Debbie Boucher is a 77 year old female with past medical history significant for HTN, hypothyroidism, DGD, migraine headaches, hearing loss, MDD, Anxiety, prior gastric bypass surgery, who was admitted on 4/4/2023 with T12 compression fracture.    Essentially, prior to admission Lost balance cleaning cat litter box fell backwards with immediate mid to low back pain.  Neurologically intact.  No head injury or LOC.  Advanced imaging was performed CT thoracic and lumbar spine, pelvis, chest and abdomen.  Found to have acute fracture of the superior endplate T12 with 30% loss of height and 2 mm of retropulsion without additional fractures.  Osteopenic bones.  Neurosurgery was contacted from the emergency department who recommended bedrest until TLSO. No surgical plans were currently recommended.    She underwent TLSO brace which has given her significant relief.  She now is working with physical therapy and TCU has been recommended.  Referrals have been sent and she is medically stable for discharge to TCU once placement is found.         Fall suspected mechanical  T12 compression fracture  DGD  Osteoporosis   -TLSO when out of bed  -NSG consult appreciated  -Analgesia PRN: Scheduled Tylenol, as needed Toradol, as needed oxycodone oral and as needed IV Dilaudid  -added lidoderm patch, gabapentin  -TCU upon discharge.     Incidental Significant biliary dilatation: Apparently this is chronic and noted on a CT scan as far back as 2012.  --Given absence of abdominal symptoms or LFT abnormalities we will hold off on further evaluation for now.    Murmur   Asymptomatic, no symptoms of CHF does have a systolic murmur on exam which is not previously reported or documented. No recent echocardiogram.  -if there are any surgical  "plans, obtain echo while admitted otherwise outpatient     Lung, Pleural Scarring  Noted on CT after initial evaluation of patient.  CT scan demonstrates moderate biapical pleural-parenchymal scarring and intralobular septal thickening bilaterally that may represent early fibrosis.  - monitor resp symptoms  - consider outpatient Pulmonology follow up, PFTs etc.      Hypertension  - resume home BP meds with parameters      History of gastric bypass surgery  - Weight 42.2 kg on admit, 44.6 last April 2022 per chart review.   - monitor weight, intake.      Hearing Loss   -Patient is quite hard of hearing without hearing aids in and there is concern from family about where that she is understanding questions are discussing medical plans.  Family, daughter would like to be involved with discussions about care plan etc.  -Pocket talker as needed     LEXIE  MDD  Migraine, Headache   - resume PRN medications           Diet:  Regular diet      DVT Prophylaxis: Enoxaparin (Lovenox) SQ  Perez Catheter: Not present  Cardiac Monitoring: None  Code Status:   Full code discussed with the patient on admission, she would not like long-term life support or exhaustive efforts if outcome is expected to be poor.   Disposition: Okay to discharge to TCU once placement is found    Clinically Significant Risk Factors                         # Cachexia: Estimated body mass index is 17.59 kg/m  as calculated from the following:    Height as of this encounter: 1.549 m (5' 1\").    Weight as of this encounter: 42.2 kg (93 lb 1.6 oz)., PRESENT ON ADMISSION               Interval History (Subjective):        Symptoms significantly better since her TLSO brace was placed  No new issues otherwise, she has had bowel movements  Stable for discharge to TCU once placement is found  Family updated at the bedside           Physical Exam:      Last Vital Signs:  BP (!) 152/64 (BP Location: Left arm)   Pulse 64   Temp 98.2  F (36.8  C) (Oral)   Resp 20   " "Ht 1.549 m (5' 1\")   Wt 42.2 kg (93 lb 1.6 oz)   SpO2 96%   BMI 17.59 kg/m        Intake/Output Summary (Last 24 hours) at 4/5/2023 1139  Last data filed at 4/5/2023 0556  Gross per 24 hour   Intake --   Output 250 ml   Net -250 ml       General: Alert, awake, no acute distress.  Sitting up in chair with TLSO brace in place.  HEENT: NC/AT, eyes anicteric, external occular movements intact, face symmetric.  Pulmonary: Normal chest rise, normal work of breathing.   Abdomen: Brace in place.  Extremities: no deformities.  Warm, well perfused.  Skin: no rashes or lesions noted.  Warm and Dry.  Neuro: No focal deficits noted.  Speech clear.    Psych: Appropriate affect.         Medications:      All current medications were reviewed with changes reflected in problem list.         Data:      All new lab and imaging data was reviewed.   Labs:  Recent Labs   Lab 04/05/23  0742      POTASSIUM 4.0   CHLORIDE 103   CO2 29   ANIONGAP 6*   GLC 97   BUN 17.1   CR 0.70   GFRESTIMATED 89   NAMAN 8.7*     Recent Labs   Lab 04/07/23  0545 04/05/23  0742   WBC  --  4.4   HGB  --  11.2*   HCT  --  34.6*   MCV  --  106*    185      Imaging:   Recent Results (from the past 48 hour(s))   Lumbar spine CT w/o contrast    Narrative    EXAM: CT LUMBAR SPINE W/O CONTRAST  LOCATION: [facility]  DATE/TIME: 4/4/2023 5:12 PM    INDICATION: Fall, back injury  COMPARISON: None.  TECHNIQUE: Routine CT Lumbar Spine without IV contrast. Multiplanar reformats. Dose reduction techniques were used.     FINDINGS:  VERTEBRA: Acute fracture superior endplate of T12 with 30% loss of height, 2 mm retropulsion. No additional fractures. Grade 1 anterolisthesis L4-L5. Minor dextrocurvature. Osteopenic bones.     CANAL/FORAMINA: No canal or neural foraminal stenosis.    PARASPINAL: No extraspinal abnormality.      Impression    IMPRESSION:  1.  Acute fracture superior endplate of T12 with slight loss of height, minimal " retropulsion.   CT Pelvis Bone wo Contrast    Narrative    EXAM: CT PELVIS BONE WO CONTRAST  LOCATION: [facility]  DATE/TIME: 4/4/2023 5:13 PM    INDICATION: Trauma, fall, pain.  COMPARISON: None.  TECHNIQUE: CT scan of the pelvis was performed without IV contrast. Multiplanar reformats were obtained. Dose reduction techniques were used.  CONTRAST: None.    FINDINGS:    PELVIS ORGANS: Vascular calcifications are noted. There is thinning of the anterior abdominal wall musculature, partially imaged.    MUSCULOSKELETAL: There is diffuse severe osseous demineralization, which decreases the CT sensitivity for the detection of osseous pathology.    There are mild degenerative changes in both hips. No acute fracture or dislocation is identified.      Impression    IMPRESSION:  1.  No acute fracture is identified. If there is persistent concern, MRI would be more sensitive given the patient's degree of osseous demineralization.  2.  Mild degenerative changes in both hips.  3.  The lumbar spine is better evaluated on the separately dictated CT lumbar spine from the same day, please see that report for details.   CT Chest Abodmen w/o Contrast    Narrative    EXAM: CT CHEST ABDOMEN W/O CONTRAST  LOCATION: [facility]  DATE/TIME: 4/4/2023 6:34 PM    INDICATION: Trauma, upper abdominal pain. contrast allergy  COMPARISON: None.  TECHNIQUE: CT scan of the chest and abdomen was performed without IV contrast. Multiplanar reformats were obtained. Dose reduction techniques were used. Lack of intravenous contrast limits sensitivity and specificity.   CONTRAST: None.    FINDINGS:   LUNGS AND PLEURA: Moderate biapical pleural-parenchymal scarring. Subpleural intralobular septal thickening bilaterally without honeycombing may represent early fibrosis, right greater than left. Mild right lower lobe traction bronchiectasis. No   pneumothorax, lobar consolidation, or  effusion.    MEDIASTINUM/AXILLAE: No mediastinal hematoma. Calcifications in the region of the aortic valve may be seen in setting of stenosis. Small hiatal hernia.    CORONARY ARTERY CALCIFICATION: Severe.    HEPATOBILIARY: Significant biliary dilatation is greater than would be expected for postcholecystectomy state. The common bile duct measures up to 18 mm in diameter.    PANCREAS: Unremarkable    SPLEEN: Unremarkable    ADRENAL GLANDS: Bilateral adrenal hyperplasia, left greater than right.    KIDNEYS/BLADDER: No hydronephrosis or perinephric collections.    BOWEL: Prior gastric surgery with small bowel anastomotic suture line in the left midabdomen.    LYMPH NODES: No upper abdominal adenopathy    VASCULATURE: No abdominal aortic aneurysm. Scattered atherosclerotic plaque.    MUSCULOSKELETAL: Previous ventral hernia repair with mesh in place. Bilateral breast implants. Anterior left seventh rib sclerotic region may represent a bone island. Mild thoracic scoliosis. Less than 50% T12 compression fracture. Generalized   osteopenia.      Impression    IMPRESSION:  1.  T12 compression fracture. Please see accompanying thoracic spine CT report.  2.  Significant common duct dilatation. Short-term follow-up MRCP (with and without IV contrast) recommended if prior studies are unavailable to document stability.   CT Thoracic Spine w/o Contrast    Narrative    EXAM: CT THORACIC SPINE W/O CONTRAST  LOCATION: [facility]  DATE/TIME: 4/4/2023 6:34 PM    INDICATION: Trauma  COMPARISON: None.  TECHNIQUE: Routine CT Thoracic Spine without IV contrast. Multiplanar reformats. Dose reduction techniques were used.     FINDINGS:  VERTEBRA:  Acute T12 burst fracture with approximately 25% vertebral body height loss and 3 mm retropulsion of the posterior superior cortex. No posttraumatic subluxation.     CANAL/FORAMINA: No significant canal or neural foraminal stenosis.    PARASPINAL:  Very mild prevertebral  edema at the level of the fracture. The thyroid glands appear atrophic.      Impression    IMPRESSION:  1.  Acute T12 burst fracture with mild height loss and minimal retropulsion.   2.  No high-grade canal stenosis or traumatic subluxation.         Tony Chery MD .    I've spent 40 minutes in chart review, ordering medications and tests, obtaining additional history as needed, evaluating the patient and in documentation for this encounter.

## 2023-04-07 NOTE — DISCHARGE SUMMARY
[facility]  Discharge Summary  Name: Debbie Boucher    MRN: [id number]  YOB: 1945    Age: 77 year old  Date of Discharge:  4/7/2023  Date of Admission: 4/4/2023  Primary Care Provider: Arlin Young  Discharge Physician:  Scar Chery MD  Discharging Service:  Hospitalist      Hospital Course/Discharge Diagnoses:  Debbie Boucher is a 77 year old female with past medical history significant for HTN, hypothyroidism, DGD, migraine headaches, hearing loss, MDD, Anxiety, prior gastric bypass surgery, who was admitted on 4/4/2023 with T12 compression fracture.     Essentially, prior to admission Lost balance cleaning cat litter box fell backwards with immediate mid to low back pain.  Neurologically intact.  No head injury or LOC.  Advanced imaging was performed CT thoracic and lumbar spine, pelvis, chest and abdomen.  Found to have acute fracture of the superior endplate T12 with 30% loss of height and 2 mm of retropulsion without additional fractures.  Osteopenic bones.  Neurosurgery was contacted from the emergency department who recommended bedrest until TLSO. No surgical plans were currently recommended.     She underwent TLSO brace which has given her significant relief.  She now is working with physical therapy and TCU has been recommended.  Referrals have been sent and she is medically stable for discharge to TCU once placement is found.           Fall suspected mechanical  T12 compression fracture  DGD  Osteoporosis   -TLSO when out of bed  -NSG consult appreciated  -Analgesia PRN: Scheduled Tylenol, as needed Toradol, as needed oxycodone oral and as needed IV Dilaudid  -added lidoderm patch, gabapentin  -TCU upon discharge.     Incidental Significant biliary dilatation: Apparently this is chronic and noted on a CT scan as far back as 2012.  --Given absence of abdominal symptoms or LFT abnormalities we will hold off on further evaluation for  now.     Murmur   Asymptomatic, no symptoms of CHF does have a systolic murmur on exam which is not previously reported or documented. No recent echocardiogram.  -if there are any surgical plans, obtain echo while admitted otherwise outpatient     Lung, Pleural Scarring  Noted on CT after initial evaluation of patient.  CT scan demonstrates moderate biapical pleural-parenchymal scarring and intralobular septal thickening bilaterally that may represent early fibrosis.  - monitor resp symptoms  - consider outpatient Pulmonology follow up, PFTs etc.      Hypertension  - resume home BP meds with parameters      History of gastric bypass surgery  - Weight 42.2 kg on admit, 44.6 last April 2022 per chart review.   - monitor weight, intake.      Hearing Loss   -Patient is quite hard of hearing without hearing aids in and there is concern from family about where that she is understanding questions are discussing medical plans.  Family, daughter would like to be involved with discussions about care plan etc.  -Pocket talker as needed     LEXIE  MDD  Migraine, Headache   - resume PRN medications         Discharge Disposition:  Discharged to short-term care facility     Allergies:  Allergies   Allergen Reactions     Hydrochlorothiazide Hives     Codeine      Contrast Dye      Diagnostic X-Ray Materials Itching     Levofloxacin      Sulfa Drugs Itching     topical        Discharge Medications:        Review of your medicines      START taking      Dose / Directions   * acetaminophen 325 MG tablet  Commonly known as: TYLENOL      Dose: 975 mg  Take 3 tablets (975 mg) by mouth every 8 hours for 7 days  Refills: 0     * acetaminophen 325 MG tablet  Commonly known as: TYLENOL      Dose: 650 mg  Take 2 tablets (650 mg) by mouth every 6 hours as needed for mild pain Do not exceed 4000 mg of tylenol per day.  Refills: 0     gabapentin 100 MG capsule  Commonly known as: NEURONTIN      Dose: 200 mg  Take 2 capsules (200 mg) by mouth 3  times daily for 7 days  Refills: 0     hydrOXYzine 25 MG tablet  Commonly known as: ATARAX      Dose: 25 mg  Take 1 tablet (25 mg) by mouth every 6 hours as needed for other or itching (adjuvant pain)  Refills: 0     * oxyCODONE 5 MG tablet  Commonly known as: ROXICODONE  Notes to patient: Given 5mg at 1515 next available at 1915pm      Dose: 2.5 mg  Take 0.5 tablets (2.5 mg) by mouth every 4 hours as needed for moderate pain  Quantity: 10 tablet  Refills: 0     * oxyCODONE 5 MG tablet  Commonly known as: ROXICODONE  Notes to patient: Given 5mg at 1515 next available at 1915pm      Dose: 5 mg  Take 1 tablet (5 mg) by mouth every 4 hours as needed for severe pain (IF pain not managed with non-pharmacological and non-opioid interventions)  Quantity: 10 tablet  Refills: 0     polyethylene glycol 17 GM/Dose powder  Commonly known as: MIRALAX  Notes to patient: 4/7- having loose stools at this time      Dose: 17 g  Take 17 g by mouth 2 times daily as needed for constipation  Quantity: 510 g  Refills: 0         * This list has 4 medication(s) that are the same as other medications prescribed for you. Read the directions carefully, and ask your doctor or other care provider to review them with you.            CONTINUE these medicines which may have CHANGED, or have new prescriptions. If we are uncertain of the size of tablets/capsules you have at home, strength may be listed as something that might have changed.      Dose / Directions   multivitamin w/minerals tablet  This may have changed:     how much to take    when to take this      Dose: 1 tablet  Take 1 tablet by mouth daily  Refills: 0        CONTINUE these medicines which have NOT CHANGED      Dose / Directions   buPROPion 150 MG 24 hr tablet  Commonly known as: WELLBUTRIN XL      Dose: 150 mg  Take 150 mg by mouth every morning  Refills: 0     FLUoxetine 40 MG capsule  Commonly known as: PROzac      Dose: 40 mg  Take 40 mg by mouth daily  Refills: 0    "  LEVOTHYROXINE SODIUM PO  Indication: Underactive Thyroid      Dose: 75 mcg  Take 75 mcg by mouth daily  Refills: 0     lisinopril 20 MG tablet  Commonly known as: ZESTRIL      Dose: 20 mg  Take 20 mg by mouth daily  Refills: 0     MAXALT PO      Dose: 10 mg  Take 10 mg by mouth once as needed for migraine  Refills: 0     omeprazole 40 MG DR capsule  Commonly known as: priLOSEC      Dose: 40 mg  Take 40 mg by mouth daily  Refills: 0     TRAZODONE HCL PO  Indication: Trouble Sleeping, may repeat times 1      Dose: 100 mg  Take 100 mg by mouth At Bedtime  Refills: 0           Where to get your medicines      Information about where to get these medications is not yet available    Ask your nurse or doctor about these medications    oxyCODONE 5 MG tablet    oxyCODONE 5 MG tablet         Condition on Discharge:  Discharge condition: Stable       Code status on discharge: Full Code     History of Illness:  See detailed admission note for full details.    Physical Exam:  Vital signs:  Temp: 98.2  F (36.8  C) Temp src: Oral BP: (!) 152/64 Pulse: 64   Resp: 20 SpO2: 96 % O2 Device: None (Room air) Oxygen Delivery: 2 LPM Height: 154.9 cm (5' 1\") Weight: 42.2 kg (93 lb 1.6 oz)  Estimated body mass index is 17.59 kg/m  as calculated from the following:    Height as of this encounter: 1.549 m (5' 1\").    Weight as of this encounter: 42.2 kg (93 lb 1.6 oz).    Wt Readings from Last 1 Encounters:   04/04/23 42.2 kg (93 lb 1.6 oz)     General: Alert, awake, no acute distress.  Sitting up in chair with TLSO brace in place.  HEENT: NC/AT, eyes anicteric, external occular movements intact, face symmetric.  Pulmonary: Normal chest rise, normal work of breathing.   Abdomen: Brace in place.  Extremities: no deformities.  Warm, well perfused.  Skin: no rashes or lesions noted.  Warm and Dry.  Neuro: No focal deficits noted.  Speech clear.    Psych: Appropriate affect.  Procedures other than Imaging:  none     Imaging:  Results for orders " placed or performed during the hospital encounter of 04/04/23   Lumbar spine CT w/o contrast    Narrative    EXAM: CT LUMBAR SPINE W/O CONTRAST  LOCATION: [facility]  DATE/TIME: 4/4/2023 5:12 PM    INDICATION: Fall, back injury  COMPARISON: None.  TECHNIQUE: Routine CT Lumbar Spine without IV contrast. Multiplanar reformats. Dose reduction techniques were used.     FINDINGS:  VERTEBRA: Acute fracture superior endplate of T12 with 30% loss of height, 2 mm retropulsion. No additional fractures. Grade 1 anterolisthesis L4-L5. Minor dextrocurvature. Osteopenic bones.     CANAL/FORAMINA: No canal or neural foraminal stenosis.    PARASPINAL: No extraspinal abnormality.      Impression    IMPRESSION:  1.  Acute fracture superior endplate of T12 with slight loss of height, minimal retropulsion.   CT Pelvis Bone wo Contrast    Narrative    EXAM: CT PELVIS BONE WO CONTRAST  LOCATION: [facility]  DATE/TIME: 4/4/2023 5:13 PM    INDICATION: Trauma, fall, pain.  COMPARISON: None.  TECHNIQUE: CT scan of the pelvis was performed without IV contrast. Multiplanar reformats were obtained. Dose reduction techniques were used.  CONTRAST: None.    FINDINGS:    PELVIS ORGANS: Vascular calcifications are noted. There is thinning of the anterior abdominal wall musculature, partially imaged.    MUSCULOSKELETAL: There is diffuse severe osseous demineralization, which decreases the CT sensitivity for the detection of osseous pathology.    There are mild degenerative changes in both hips. No acute fracture or dislocation is identified.      Impression    IMPRESSION:  1.  No acute fracture is identified. If there is persistent concern, MRI would be more sensitive given the patient's degree of osseous demineralization.  2.  Mild degenerative changes in both hips.  3.  The lumbar spine is better evaluated on the separately dictated CT lumbar spine from the same day, please see that report for details.    CT Chest Abodmen w/o Contrast    Narrative    EXAM: CT CHEST ABDOMEN W/O CONTRAST  LOCATION: [facility]  DATE/TIME: 4/4/2023 6:34 PM    INDICATION: Trauma, upper abdominal pain. contrast allergy  COMPARISON: None.  TECHNIQUE: CT scan of the chest and abdomen was performed without IV contrast. Multiplanar reformats were obtained. Dose reduction techniques were used. Lack of intravenous contrast limits sensitivity and specificity.   CONTRAST: None.    FINDINGS:   LUNGS AND PLEURA: Moderate biapical pleural-parenchymal scarring. Subpleural intralobular septal thickening bilaterally without honeycombing may represent early fibrosis, right greater than left. Mild right lower lobe traction bronchiectasis. No   pneumothorax, lobar consolidation, or effusion.    MEDIASTINUM/AXILLAE: No mediastinal hematoma. Calcifications in the region of the aortic valve may be seen in setting of stenosis. Small hiatal hernia.    CORONARY ARTERY CALCIFICATION: Severe.    HEPATOBILIARY: Significant biliary dilatation is greater than would be expected for postcholecystectomy state. The common bile duct measures up to 18 mm in diameter.    PANCREAS: Unremarkable    SPLEEN: Unremarkable    ADRENAL GLANDS: Bilateral adrenal hyperplasia, left greater than right.    KIDNEYS/BLADDER: No hydronephrosis or perinephric collections.    BOWEL: Prior gastric surgery with small bowel anastomotic suture line in the left midabdomen.    LYMPH NODES: No upper abdominal adenopathy    VASCULATURE: No abdominal aortic aneurysm. Scattered atherosclerotic plaque.    MUSCULOSKELETAL: Previous ventral hernia repair with mesh in place. Bilateral breast implants. Anterior left seventh rib sclerotic region may represent a bone island. Mild thoracic scoliosis. Less than 50% T12 compression fracture. Generalized   osteopenia.      Impression    IMPRESSION:  1.  T12 compression fracture. Please see accompanying thoracic spine CT report.  2.   Significant common duct dilatation. Short-term follow-up MRCP (with and without IV contrast) recommended if prior studies are unavailable to document stability.   CT Thoracic Spine w/o Contrast    Narrative    EXAM: CT THORACIC SPINE W/O CONTRAST  LOCATION: [facility]  DATE/TIME: 4/4/2023 6:34 PM    INDICATION: Trauma  COMPARISON: None.  TECHNIQUE: Routine CT Thoracic Spine without IV contrast. Multiplanar reformats. Dose reduction techniques were used.     FINDINGS:  VERTEBRA:  Acute T12 burst fracture with approximately 25% vertebral body height loss and 3 mm retropulsion of the posterior superior cortex. No posttraumatic subluxation.     CANAL/FORAMINA: No significant canal or neural foraminal stenosis.    PARASPINAL:  Very mild prevertebral edema at the level of the fracture. The thyroid glands appear atrophic.      Impression    IMPRESSION:  1.  Acute T12 burst fracture with mild height loss and minimal retropulsion.   2.  No high-grade canal stenosis or traumatic subluxation.        Consultations:  Consultation during this admission received from neurosurgery.       Recent Lab Results:  Recent Labs   Lab 04/07/23  0545 04/05/23  0742 04/04/23  1649   WBC  --  4.4 6.5   HGB  --  11.2* 12.3   HCT  --  34.6* 36.3   MCV  --  106* 102*    185 225          Lab Results   Component Value Date     04/05/2023     04/04/2023     02/01/2017    NA  02/01/2017     Canceled, Test credited   Unsatisfactory specimen - hemolyzed  REQUESTED RECOLLECT ON TRACKBOARD @1604 BT       10/23/2007    Lab Results   Component Value Date    CHLORIDE 103 04/05/2023    CHLORIDE 105 04/04/2023    CHLORIDE 104 02/01/2017    CHLORIDE  02/01/2017     Canceled, Test credited   Unsatisfactory specimen - hemolyzed  REQUESTED RECOLLECT ON TRACKBOARD @1604 BT      CHLORIDE 101 10/23/2007    Lab Results   Component Value Date    BUN 17.1 04/05/2023    BUN 18.4 04/04/2023    BUN 9 02/01/2017    BUN   02/01/2017     Canceled, Test credited   Unsatisfactory specimen - hemolyzed  REQUESTED RECOLLECT ON TRACKBOARD @1604 BT      BUN 13 10/23/2007      Lab Results   Component Value Date    POTASSIUM 4.0 04/05/2023    POTASSIUM 4.8 04/04/2023    POTASSIUM 3.8 02/01/2017    POTASSIUM  02/01/2017     Canceled, Test credited   Unsatisfactory specimen - hemolyzed  REQUESTED RECOLLECT ON TRACKBOARD @1604 BT      POTASSIUM 4.3 10/23/2007    Lab Results   Component Value Date    CO2 29 04/05/2023    CO2 27 04/04/2023    CO2 31 02/01/2017    CO2  02/01/2017     Canceled, Test credited   Unsatisfactory specimen - hemolyzed  REQUESTED RECOLLECT ON TRACKBOARD @1604 BT      CO2 33 10/23/2007    Lab Results   Component Value Date    CR 0.70 04/05/2023    CR 0.74 04/04/2023    CR 0.71 02/01/2017    CR  02/01/2017     Canceled, Test credited   Unsatisfactory specimen - hemolyzed  REQUESTED RECOLLECT ON TRACKBOARD @1604 BT      CR 0.59 10/23/2007             Pending Results:    Unresulted Labs Ordered in the Past 30 Days of this Admission     No orders found from 3/5/2023 to 4/5/2023.           Discharge Instructions and Follow-Up:   Discharge Procedure Orders   General info for SNF   Order Comments: Length of Stay Estimate: Short Term Care: Estimated # of Days <30  Condition at Discharge: Stable  Level of care:skilled   Rehabilitation Potential: Good  Admission H&P remains valid and up-to-date: Yes  Recent Chemotherapy: N/A  Use Nursing Home Standing Orders: Yes     Mantoux instructions   Order Comments: Give two-step Mantoux (PPD) Per Facility Policy Yes     Reason for your hospital stay   Order Comments: You were admitted for a thoracic fracture     Activity - Up ad charla   Order Comments: TLSO brace on when out of bed.  Ok to remove for bathing.     Order Specific Question Answer Comments   Is discharge order? Yes      Follow-up and recommended labs and tests    Order Comments: Follow up routinely with TCU provider.    Follow up  with neurosurgery clinic in 6 weeks for x-rays    Follow up with your primary care doctor for osteoporosis eval/treatment.     Physical Therapy Adult Consult   Order Comments: Evaluate and treat as clinically indicated.    Reason:  thoracic fracture     Occupational Therapy Adult Consult   Order Comments: Evaluate and treat as clinically indicated.    Reason:  thoracic fracture     Fall precautions     Diet   Order Comments: Follow this diet upon discharge: Orders Placed This Encounter      Combination Diet Regular Diet Adult     Order Specific Question Answer Comments   Is discharge order? Yes        Total time spent in face to face contact with the patient and coordinating discharge was:  40 Minutes.

## 2023-04-07 NOTE — PLAN OF CARE
Assessments: AxOx4. VSS. On room air, no SoB. On TLSO brace, patient preferred to wear it overnight. Refused lidocaine patch, scheduled Tylenol and PRN Oxycodone given with relief. Up with Ax1 with walker    Treatment Plan: conservative treatment, pending TCU placement    Bedside Nurse: Brandon Falcon RN

## 2023-04-07 NOTE — PLAN OF CARE
Physical Therapy Discharge Summary    Reason for therapy discharge:    Discharged to transitional care facility.    Progress towards therapy goal(s). See goals on Care Plan in [facility] electronic health record for goal details.  Goals not met.  Barriers to achieving goals:   limited tolerance for therapy and discharge from facility.    Therapy recommendation(s):    Continued therapy is recommended.  Rationale/Recommendations:  TCU to maximize return to PLOF.

## 2023-04-07 NOTE — PLAN OF CARE
Occupational Therapy Discharge Summary    Reason for therapy discharge:    Discharged to transitional care facility.    Progress towards therapy goal(s). See goals on Care Plan in [facility] electronic health record for goal details.  Goals partially met.  Barriers to achieving goals:   discharge from facility.    Therapy recommendation(s):    Continued therapy is recommended.  Rationale/Recommendations:  patient is below baseline for ADL and functional mobility, now required to wear TLSO and use spine precautions. Has been having worsening balance concerns. Would benefit from OT in IP and TCU setting to maximize ADl and mobility indp for safe return home..      **Pt not seen by writer on this date, note written based on previous treating OT's note and recommendations.

## 2023-04-07 NOTE — PROGRESS NOTES
Care Management Follow Up    Length of Stay (days): 3    Expected Discharge Date: 04/07/2023     Additional Information:  You have successfully submitted the preadmission screening (PAS) to the [employer] on:  4/7/2023 2:46 PM  Your confirmation number is: [id number]  Level of Care: Based on the information you provided, it appears this person meets level of care for purposes of MA payment.   OBRA: It appears this person does not need an OBRA Level II assessment.       Marley Troy, RN      Marley Troy RN Case Manager  Inpatient Care Coordination  [facility]   [phone]

## 2023-04-07 NOTE — PLAN OF CARE
Goal Outcome Evaluation:             Pt discharged to TCU at [facility], discussed discharge papers with pt and son Joaquín. Denies questions or concerns at this time. TLSO brace in place at all times. Assisted to shower with assist of 1 staff. Oxycodone 5 mg 1515 prior to discharge. Printed script in discharge packet    Unfortunately this writer did forget to give the discharge packet to the pt or family at time of discharge. Left message with patient and daughter Jen.

## 2023-04-08 ENCOUNTER — PATIENT OUTREACH (OUTPATIENT)
Dept: CARE COORDINATION | Facility: CLINIC | Age: 78
End: 2023-04-08
Payer: MEDICARE

## 2023-04-08 NOTE — PROGRESS NOTES
[facility] Care Resource [place]    Background: Transitional Care Management program identified per system criteria and reviewed by [facility] team for possible outreach.    Assessment: Upon chart review, [facility] Team member will not proceed with patient outreach related to this episode of Transitional Care Management program due to reason below:    Patient has discharged to a Memory Care, Long-term Care, Assisted Living or Group Home where patient is receiving on-site support with their daily cares, including support with hospital follow up plan.     Skilled Nursing Facility   Plan: Transitional Care Management episode addressed appropriately per reason noted above.      ANNE Lemus  Connected Care Resource [place], [facility]    *Connected Care Resource Team does NOT follow patient ongoing. Referrals are identified based on internal discharge reports and the outreach is to ensure patient has an understanding of their discharge instructions.

## 2023-04-10 ENCOUNTER — TRANSITIONAL CARE UNIT VISIT (OUTPATIENT)
Dept: GERIATRICS | Facility: CLINIC | Age: 78
End: 2023-04-10
Payer: MEDICARE

## 2023-04-10 VITALS
RESPIRATION RATE: 18 BRPM | WEIGHT: 95.6 LBS | SYSTOLIC BLOOD PRESSURE: 142 MMHG | HEIGHT: 61 IN | BODY MASS INDEX: 18.05 KG/M2 | DIASTOLIC BLOOD PRESSURE: 68 MMHG | TEMPERATURE: 97.7 F | HEART RATE: 66 BPM | OXYGEN SATURATION: 95 %

## 2023-04-10 DIAGNOSIS — R01.1 HEART MURMUR: ICD-10-CM

## 2023-04-10 DIAGNOSIS — I10 HYPERTENSION, UNSPECIFIED TYPE: ICD-10-CM

## 2023-04-10 DIAGNOSIS — S22.081A BURST FRACTURE OF T12 VERTEBRA (H): Primary | ICD-10-CM

## 2023-04-10 DIAGNOSIS — F33.1 MODERATE EPISODE OF RECURRENT MAJOR DEPRESSIVE DISORDER (H): ICD-10-CM

## 2023-04-10 DIAGNOSIS — G43.909 MIGRAINE WITHOUT STATUS MIGRAINOSUS, NOT INTRACTABLE, UNSPECIFIED MIGRAINE TYPE: ICD-10-CM

## 2023-04-10 PROCEDURE — 86481 TB AG RESPONSE T-CELL SUSP: CPT | Performed by: PHYSICIAN ASSISTANT

## 2023-04-10 PROCEDURE — 99310 SBSQ NF CARE HIGH MDM 45: CPT | Performed by: PHYSICIAN ASSISTANT

## 2023-04-10 PROCEDURE — 36415 COLL VENOUS BLD VENIPUNCTURE: CPT | Performed by: PHYSICIAN ASSISTANT

## 2023-04-10 PROCEDURE — P9603 ONE-WAY ALLOW PRORATED MILES: HCPCS | Performed by: PHYSICIAN ASSISTANT

## 2023-04-10 NOTE — LETTER
4/10/2023        RE: Debbie Boucher  [address]  [facility] [address]        [facility] GERIATRICS    PRIMARY CARE PROVIDER AND CLINIC:  Arlin Young MD, [facility] [address] / [place]*  Chief Complaint   Patient presents with     Hospital F/U      [place] Medical Record Number:  [id number]  Place of Service where encounter took place:  [facility] ([facility]) [[id number]]    Debbie Boucher  is a 77 year old  (1945), admitted to the above facility from  [facility]. Hospital stay 4/4/23 through 4/7/23..   HPI:    Notes from hospitalization reviewed including H&P  and D/c summary    Debbie Boucher is a 77-year-old female with past medical history of hypertension, hypothyroidism, migraines, depression, anxiety who was recently hospitalized at [facility] for T12 compression fracture.  Suffered a fall while losing her balance at home with immediate onset of mid-low back pain.  CT of the thoracic and lumbar spine was notable for endplate T12 compression fracture with 30% loss and 2 mm of  retropulsion.  Assessed by neurosurgery who recommended TLSO brace and repeat imaging in 6 weeks.  Initiated on multimodal pain regimen.  Assessed by physical therapy and discharged to [facility].    Debbie is evaluated in her room. Just finished with PT. Wearing TLSO brace. Pain is controlled with oxycodone. Using primarily 5 mg dose approx 2 doses per day. No BM since admission and suggested change Miralax to scheduled and she is in agreement. Notes good appetitie. No dizziness or lightheadness.  No lower extremity weakness.    Also spoke with daughter, Jen, and updated regarding POC    Review of nursing home EMR: -150    CODE STATUS/ADVANCE DIRECTIVES DISCUSSION:  Prior  CPR/Full code   ALLERGIES:   Allergies   Allergen Reactions     Hydrochlorothiazide Hives     Codeine      Contrast Dye      Diagnostic X-Ray Materials Itching      Levofloxacin      Sulfa Drugs Itching     topical      PAST MEDICAL HISTORY:   Past Medical History:   Diagnosis Date     Anxiety      Depressive disorder      Migraines       PAST SURGICAL HISTORY:   has a past surgical history that includes ENT surgery; appendectomy; Cholecystectomy; hernia repair; GYN surgery; orthopedic surgery; and Abdomen surgery.  FAMILY HISTORY: family history is not on file.  SOCIAL HISTORY:     Patient's living condition: lives alone    Post Discharge Medication Reconciliation Status:   MED REC REQUIRED  Post Medication Reconciliation Status: discharge medications reconciled and changed, per note/orders       Current Outpatient Medications   Medication Sig     acetaminophen (TYLENOL) 325 MG tablet Take 3 tablets (975 mg) by mouth every 8 hours for 7 days     acetaminophen (TYLENOL) 325 MG tablet Take 2 tablets (650 mg) by mouth every 6 hours as needed for mild pain Do not exceed 4000 mg of tylenol per day.     buPROPion (WELLBUTRIN XL) 150 MG 24 hr tablet Take 150 mg by mouth every morning     FLUoxetine (PROZAC) 40 MG capsule Take 40 mg by mouth daily     gabapentin (NEURONTIN) 100 MG capsule Take 2 capsules (200 mg) by mouth 3 times daily for 7 days     LEVOTHYROXINE SODIUM PO Take 75 mcg by mouth daily     omeprazole (PRILOSEC) 40 MG DR capsule Take 40 mg by mouth daily     oxyCODONE (ROXICODONE) 5 MG tablet Take 0.5 tablets (2.5 mg) by mouth every 4 hours as needed for moderate pain     oxyCODONE (ROXICODONE) 5 MG tablet Take 1 tablet (5 mg) by mouth every 4 hours as needed for severe pain (IF pain not managed with non-pharmacological and non-opioid interventions)     polyethylene glycol (MIRALAX) 17 GM/Dose powder Take 17 g by mouth 2 times daily as needed for constipation     Rizatriptan Benzoate (MAXALT PO) Take 10 mg by mouth once as needed for migraine     hydrOXYzine (ATARAX) 25 MG tablet Take 1 tablet (25 mg) by mouth every 6 hours as needed for other or itching (adjuvant pain)  "    lisinopril (ZESTRIL) 20 MG tablet Take 20 mg by mouth daily     multivitamin w/minerals (THERA-VIT-M) tablet Take 1 tablet by mouth daily     TRAZODONE HCL PO Take 100 mg by mouth At Bedtime      No current facility-administered medications for this visit.       ROS:  4 point ROS including Respiratory, CV, GI and , other than that noted in the HPI,  is negative    Vitals:  BP (!) 142/68   Pulse 66   Temp 97.7  F (36.5  C)   Resp 18   Ht 1.549 m (5' 1\")   Wt 43.4 kg (95 lb 9.6 oz)   SpO2 95%   BMI 18.06 kg/m    Exam:  Physical Exam  Constitutional:       Appearance: Normal appearance.   HENT:      Head: Normocephalic and atraumatic.   Eyes:      General: No scleral icterus.  Cardiovascular:      Comments: Unable to osculate heart tones due to TLSO brace   Pulmonary:      Effort: Pulmonary effort is normal.      Breath sounds: No wheezing.   Abdominal:      Tenderness: There is no abdominal tenderness.   Musculoskeletal:      Left lower leg: No edema.   Skin:     General: Skin is warm and dry.      Findings: No rash.   Neurological:      General: No focal deficit present.      Mental Status: She is alert.      Cranial Nerves: No cranial nerve deficit.   Psychiatric:         Mood and Affect: Mood normal.           Lab/Diagnostic data:  Recent labs in [facility] reviewed by me today.  and   Most Recent 3 CBC's:Recent Labs   Lab Test 04/07/23  0545 04/05/23 0742 04/04/23 1649 02/01/17  1537   WBC  --  4.4 6.5 6.6   HGB  --  11.2* 12.3 14.6   MCV  --  106* 102* 97    185 225 230     Most Recent 3 BMP's:Recent Labs   Lab Test 04/05/23  0742 04/04/23 1649 02/01/17  1704    141 142   POTASSIUM 4.0 4.8 3.8   CHLORIDE 103 105 104   CO2 29 27 31   BUN 17.1 18.4 9   CR 0.70 0.74 0.71   ANIONGAP 6* 9 7   NAMAN 8.7* 9.5 8.3*   GLC 97 111* 94     Most Recent 3 Hemoglobins:Recent Labs   Lab Test 04/05/23  0742 04/04/23 1649 02/01/17  1537   HGB 11.2* 12.3 14.6     Most Recent TSH and T4:Recent Labs   Lab Test " 02/01/17  1704   TSH 0.65       ASSESSMENT/PLAN:  Fall  T12 compression fracture:  - Continue TLSO brace when out of bed.  No bending/twisting.  Has follow-up with neurosurgery 5/18  - Continue pain control with scheduled Tylenol and as needed oxycodone.  Discontinue duplicate as needed Tylenol  - Discontinue Atarax  -Started on gabapentin 200 mg 3 times daily in the hospital.  Current end date for 414.  Will leave end date in place and monitor  -Start vitamin D 2000 units daily  - Follow-up with primary care provider for osteoporosis management    Hypertension: BP well controlled  - Continue lisinopril    Migraine: Denies recent issues with migraines  - Maxalt remains available as needed    Depression  Anxiety;  -Continue Prozac and Wellbutrin    Heart Murmur: Noted per hospital discharge paperwork.  Could not appropriately auscultate heart tones today due to TLSO brace while up sitting in bed.  I do see PCP was evaluating for possible syncope with Zio patch.  Could not see previous TTE  - Follow-up with PCP, consider TTE as outpatient        Orders:  D/c prn tylenol  D/c Atarax  Vit D 2000 units daily    A total 55 min were spent on this initial visit including chart review, medication reconciliation, evaluating patient, separate phone call to daughter documenting      This note was completed in part using Dragon voice recognition software. Although reviewed after completion, some word and grammatical errors may occur.      Electronically signed by:  Iveth Brice CNA                      Sincerely,        Luz Dominguez PA-C

## 2023-04-10 NOTE — PROGRESS NOTES
[facility] GERIATRICS    PRIMARY CARE PROVIDER AND CLINIC:  Arlin Young MD, [facility] [address] / [place]*  Chief Complaint   Patient presents with     Hospital F/U      [place] Medical Record Number:  [id number]  Place of Service where encounter took place:  [facility] ([facility]) [[id number]]    Debbie Boucher  is a 77 year old  (1945), admitted to the above facility from  [facility]. Hospital stay 4/4/23 through 4/7/23..   HPI:    Notes from hospitalization reviewed including H&P  and D/c summary    Debbie Boucher is a 77-year-old female with past medical history of hypertension, hypothyroidism, migraines, depression, anxiety who was recently hospitalized at [facility] for T12 compression fracture.  Suffered a fall while losing her balance at home with immediate onset of mid-low back pain.  CT of the thoracic and lumbar spine was notable for endplate T12 compression fracture with 30% loss and 2 mm of  retropulsion.  Assessed by neurosurgery who recommended TLSO brace and repeat imaging in 6 weeks.  Initiated on multimodal pain regimen.  Assessed by physical therapy and discharged to [facility].    Debbie is evaluated in her room. Just finished with PT. Wearing TLSO brace. Pain is controlled with oxycodone. Using primarily 5 mg dose approx 2 doses per day. No BM since admission and suggested change Miralax to scheduled and she is in agreement. Notes good appetitie. No dizziness or lightheadness.  No lower extremity weakness.    Also spoke with daughter, Jen, and updated regarding POC    Review of nursing home EMR: -150    CODE STATUS/ADVANCE DIRECTIVES DISCUSSION:  Prior  CPR/Full code   ALLERGIES:   Allergies   Allergen Reactions     Hydrochlorothiazide Hives     Codeine      Contrast Dye      Diagnostic X-Ray Materials Itching     Levofloxacin      Sulfa Drugs Itching     topical      PAST MEDICAL HISTORY:   Past  Medical History:   Diagnosis Date     Anxiety      Depressive disorder      Migraines       PAST SURGICAL HISTORY:   has a past surgical history that includes ENT surgery; appendectomy; Cholecystectomy; hernia repair; GYN surgery; orthopedic surgery; and Abdomen surgery.  FAMILY HISTORY: family history is not on file.  SOCIAL HISTORY:     Patient's living condition: lives alone    Post Discharge Medication Reconciliation Status:   MED REC REQUIRED  Post Medication Reconciliation Status: discharge medications reconciled and changed, per note/orders       Current Outpatient Medications   Medication Sig     acetaminophen (TYLENOL) 325 MG tablet Take 3 tablets (975 mg) by mouth every 8 hours for 7 days     acetaminophen (TYLENOL) 325 MG tablet Take 2 tablets (650 mg) by mouth every 6 hours as needed for mild pain Do not exceed 4000 mg of tylenol per day.     buPROPion (WELLBUTRIN XL) 150 MG 24 hr tablet Take 150 mg by mouth every morning     FLUoxetine (PROZAC) 40 MG capsule Take 40 mg by mouth daily     gabapentin (NEURONTIN) 100 MG capsule Take 2 capsules (200 mg) by mouth 3 times daily for 7 days     LEVOTHYROXINE SODIUM PO Take 75 mcg by mouth daily     omeprazole (PRILOSEC) 40 MG DR capsule Take 40 mg by mouth daily     oxyCODONE (ROXICODONE) 5 MG tablet Take 0.5 tablets (2.5 mg) by mouth every 4 hours as needed for moderate pain     oxyCODONE (ROXICODONE) 5 MG tablet Take 1 tablet (5 mg) by mouth every 4 hours as needed for severe pain (IF pain not managed with non-pharmacological and non-opioid interventions)     polyethylene glycol (MIRALAX) 17 GM/Dose powder Take 17 g by mouth 2 times daily as needed for constipation     Rizatriptan Benzoate (MAXALT PO) Take 10 mg by mouth once as needed for migraine     hydrOXYzine (ATARAX) 25 MG tablet Take 1 tablet (25 mg) by mouth every 6 hours as needed for other or itching (adjuvant pain)     lisinopril (ZESTRIL) 20 MG tablet Take 20 mg by mouth daily     multivitamin  "w/minerals (THERA-VIT-M) tablet Take 1 tablet by mouth daily     TRAZODONE HCL PO Take 100 mg by mouth At Bedtime      No current facility-administered medications for this visit.       ROS:  4 point ROS including Respiratory, CV, GI and , other than that noted in the HPI,  is negative    Vitals:  BP (!) 142/68   Pulse 66   Temp 97.7  F (36.5  C)   Resp 18   Ht 1.549 m (5' 1\")   Wt 43.4 kg (95 lb 9.6 oz)   SpO2 95%   BMI 18.06 kg/m    Exam:  Physical Exam  Constitutional:       Appearance: Normal appearance.   HENT:      Head: Normocephalic and atraumatic.   Eyes:      General: No scleral icterus.  Cardiovascular:      Comments: Unable to osculate heart tones due to TLSO brace   Pulmonary:      Effort: Pulmonary effort is normal.      Breath sounds: No wheezing.   Abdominal:      Tenderness: There is no abdominal tenderness.   Musculoskeletal:      Left lower leg: No edema.   Skin:     General: Skin is warm and dry.      Findings: No rash.   Neurological:      General: No focal deficit present.      Mental Status: She is alert.      Cranial Nerves: No cranial nerve deficit.   Psychiatric:         Mood and Affect: Mood normal.           Lab/Diagnostic data:  Recent labs in [facility] reviewed by me today.  and   Most Recent 3 CBC's:Recent Labs   Lab Test 04/07/23  0545 04/05/23 0742 04/04/23 1649 02/01/17  1537   WBC  --  4.4 6.5 6.6   HGB  --  11.2* 12.3 14.6   MCV  --  106* 102* 97    185 225 230     Most Recent 3 BMP's:Recent Labs   Lab Test 04/05/23  0742 04/04/23 1649 02/01/17  1704    141 142   POTASSIUM 4.0 4.8 3.8   CHLORIDE 103 105 104   CO2 29 27 31   BUN 17.1 18.4 9   CR 0.70 0.74 0.71   ANIONGAP 6* 9 7   NAMAN 8.7* 9.5 8.3*   GLC 97 111* 94     Most Recent 3 Hemoglobins:Recent Labs   Lab Test 04/05/23  0742 04/04/23 1649 02/01/17  1537   HGB 11.2* 12.3 14.6     Most Recent TSH and T4:Recent Labs   Lab Test 02/01/17  1704   TSH 0.65       ASSESSMENT/PLAN:  Fall  T12 compression " fracture:  - Continue TLSO brace when out of bed.  No bending/twisting.  Has follow-up with neurosurgery 5/18  - Continue pain control with scheduled Tylenol and as needed oxycodone.  Discontinue duplicate as needed Tylenol  - Discontinue Atarax  -Started on gabapentin 200 mg 3 times daily in the hospital.  Current end date for 414.  Will leave end date in place and monitor  -Start vitamin D 2000 units daily  - Follow-up with primary care provider for osteoporosis management    Hypertension: BP well controlled  - Continue lisinopril    Migraine: Denies recent issues with migraines  - Maxalt remains available as needed    Depression  Anxiety;  -Continue Prozac and Wellbutrin    Heart Murmur: Noted per hospital discharge paperwork.  Could not appropriately auscultate heart tones today due to TLSO brace while up sitting in bed.  I do see PCP was evaluating for possible syncope with Zio patch.  Could not see previous TTE  - Follow-up with PCP, consider TTE as outpatient        Orders:  D/c prn tylenol  D/c Atarax  Vit D 2000 units daily    A total 55 min were spent on this initial visit including chart review, medication reconciliation, evaluating patient, separate phone call to daughter documenting      This note was completed in part using Dragon voice recognition software. Although reviewed after completion, some word and grammatical errors may occur.      Electronically signed by:  Iveth Brice CNA

## 2023-04-11 LAB
GAMMA INTERFERON BACKGROUND BLD IA-ACNC: 0.04 IU/ML
M TB IFN-G BLD-IMP: NEGATIVE
M TB IFN-G CD4+ BCKGRND COR BLD-ACNC: 9.96 IU/ML
MITOGEN IGNF BCKGRD COR BLD-ACNC: 0.01 IU/ML
MITOGEN IGNF BCKGRD COR BLD-ACNC: 0.04 IU/ML
QUANTIFERON MITOGEN: 10 IU/ML
QUANTIFERON NIL TUBE: 0.04 IU/ML
QUANTIFERON TB1 TUBE: 0.05 IU/ML
QUANTIFERON TB2 TUBE: 0.08

## 2023-04-13 ENCOUNTER — DISCHARGE SUMMARY NURSING HOME (OUTPATIENT)
Dept: GERIATRICS | Facility: CLINIC | Age: 78
End: 2023-04-13
Payer: MEDICARE

## 2023-04-13 VITALS
DIASTOLIC BLOOD PRESSURE: 58 MMHG | RESPIRATION RATE: 14 BRPM | OXYGEN SATURATION: 93 % | HEIGHT: 61 IN | BODY MASS INDEX: 18.05 KG/M2 | WEIGHT: 95.6 LBS | HEART RATE: 63 BPM | SYSTOLIC BLOOD PRESSURE: 127 MMHG | TEMPERATURE: 97.8 F
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
## 2023-04-13 DIAGNOSIS — S22.081A BURST FRACTURE OF T12 VERTEBRA (H): ICD-10-CM

## 2023-04-13 DIAGNOSIS — F33.1 MODERATE EPISODE OF RECURRENT MAJOR DEPRESSIVE DISORDER (H): ICD-10-CM

## 2023-04-13 DIAGNOSIS — I10 HYPERTENSION, UNSPECIFIED TYPE: Primary | ICD-10-CM

## 2023-04-13 DIAGNOSIS — R01.1 HEART MURMUR: ICD-10-CM

## 2023-04-13 PROCEDURE — 99316 NF DSCHRG MGMT 30 MIN+: CPT | Performed by: PHYSICIAN ASSISTANT

## 2023-04-13 RX ORDER — OXYCODONE HYDROCHLORIDE 5 MG/1
2.5 TABLET ORAL EVERY 4 HOURS PRN
Qty: 20 TABLET | Refills: 0 | Status: SHIPPED | OUTPATIENT
Start: 2023-04-13 | End: 2023-04-18

## 2023-04-13 NOTE — LETTER
4/13/2023        RE: Debbie Boucher  [address]  [facility] [address]        [facility] GERIATRICS DISCHARGE SUMMARY  PATIENT'S NAME: Debbie Boucher  YOB: 1945  MEDICAL RECORD NUMBER:  [id number]  Place of Service where encounter took place:  [facility] ([facility]) [[id number]]    PRIMARY CARE PROVIDER AND CLINIC RESPONSIBLE AFTER TRANSFER:   Arlin Young MD, [facility] [address] / [place]*    Direct transfer to [facility]     Transferring providers: Luz Dominguez PA-C, Brit Kinney MD  Recent Hospitalization/ED:  [facility] Hospital stay 4/4/23 to 4/7/23.  Date of SNF Admission: April 07, 2023  Date of SNF (anticipated) Discharge: April 13, 2023  Discharged to: New TCU  DME: No new DME needed    CODE STATUS/ADVANCE DIRECTIVES DISCUSSION:  Full  ALLERGIES: Hydrochlorothiazide, Codeine, Contrast dye, Diagnostic x-ray materials, Levofloxacin, and Sulfa drugs    NURSING FACILITY COURSE   Medication Changes/Rationale:     None    Summary of nursing facility stay:     Debbie Boucher is a 77-year-old female with past medical history of hypertension, hypothyroidism, migraines, depression, anxiety who was recently hospitalized at [facility] for T12 compression fracture.  Suffered a fall while losing her balance at home with immediate onset of mid-low back pain.  CT of the thoracic and lumbar spine was notable for endplate T12 compression fracture with 30% loss and 2 mm of  retropulsion.  Assessed by neurosurgery who recommended TLSO brace and repeat imaging in 6 weeks.  Initiated on multimodal pain regimen.  Assessed by physical therapy and discharged to TCU.    Patient is electing to transfer to alternative TCU    Fall  T12 compression fracture:  - Continue TLSO brace when out of bed.  No bending/twisting.  Has follow-up with neurosurgery 5/18  - Continue pain control with scheduled Tylenol and as needed  oxycodone.    -Started on gabapentin 200 mg 3 times daily in the hospital.  Current end date for 414.  Will leave end date in place and monitor  -Started vitamin D 2000 units daily on TCU admission  - Follow-up with primary care provider for osteoporosis management    Hypertension: BP well controlled  - Continue lisinopril    Migraine: Denies recent issues with migraines  - Maxalt remains available as needed    Depression  Anxiety;  -Continue Prozac and Wellbutrin    Heart Murmur: Noted per hospital discharge paperwork.    I do see PCP was evaluating for possible syncope with Zio patch.  Could not see previous TTE  - Follow-up with PCP, consider TTE as outpatient    Discharge Medications:  MED REC REQUIRED  Post Medication Reconciliation Status: discharge medications reconciled and changed, per note/orders       Current Outpatient Medications   Medication Sig Dispense Refill     acetaminophen (TYLENOL) 325 MG tablet Take 3 tablets (975 mg) by mouth every 8 hours for 7 days       buPROPion (WELLBUTRIN XL) 150 MG 24 hr tablet Take 150 mg by mouth every morning       FLUoxetine (PROZAC) 40 MG capsule Take 40 mg by mouth daily       gabapentin (NEURONTIN) 100 MG capsule Take 2 capsules (200 mg) by mouth 3 times daily for 7 days       LEVOTHYROXINE SODIUM PO Take 75 mcg by mouth daily       lisinopril (ZESTRIL) 20 MG tablet Take 20 mg by mouth daily       oxyCODONE (ROXICODONE) 5 MG tablet Take 0.5 tablets (2.5 mg) by mouth every 4 hours as needed for moderate pain 20 tablet 0     multivitamin w/minerals (THERA-VIT-M) tablet Take 1 tablet by mouth daily       omeprazole (PRILOSEC) 40 MG DR capsule Take 40 mg by mouth daily       polyethylene glycol (MIRALAX) 17 GM/Dose powder Take 17 g by mouth 2 times daily as needed for constipation 510 g      Rizatriptan Benzoate (MAXALT PO) Take 10 mg by mouth once as needed for migraine       TRAZODONE HCL PO Take 100 mg by mouth At Bedtime             Controlled medications:  "  Oxycodone 2.5 mg sent to [facility] pharmacy     Past Medical History:   Past Medical History:   Diagnosis Date     Anxiety      Depressive disorder      Migraines      Physical Exam:   Vitals: /58   Pulse 63   Temp 97.8  F (36.6  C)   Resp 14   Ht 1.549 m (5' 1\")   Wt 43.4 kg (95 lb 9.6 oz)   SpO2 93%   BMI 18.06 kg/m    BMI: Body mass index is 18.06 kg/m .  Physical Exam  Constitutional:       Appearance: Normal appearance.   HENT:      Head: Normocephalic and atraumatic.   Cardiovascular:      Rate and Rhythm: Normal rate and regular rhythm.      Heart sounds: Murmur heard.   Pulmonary:      Breath sounds: No wheezing.   Musculoskeletal:      Right lower leg: No edema.      Left lower leg: No edema.   Skin:     General: Skin is warm and dry.      Findings: No rash.   Neurological:      General: No focal deficit present.      Mental Status: She is alert.   Psychiatric:         Mood and Affect: Mood normal.           SNF labs: Recent labs in [facility] reviewed by me today.     DISCHARGE PLAN:    Follow up labs: No labs orders/due    Medical Follow Up:      Follow up with TCU provider    Current [place] scheduled appointments:  Next 5 appointments (look out 90 days)    May 18, 2023 11:30 AM  Return Visit with Mayuri Li PA-C  [facility] [place] ([facility] ) [address] [address] [address] [phone]        TOTAL DISCHARGE TIME:   Greater than 30 minutes  Electronically signed by:  Luz Dominguez PA-C                     Sincerely,        Luz Dominguez PA-C      "

## 2023-04-13 NOTE — PROGRESS NOTES
[facility] GERIATRICS DISCHARGE SUMMARY  PATIENT'S NAME: Debbie Boucher  YOB: 1945  MEDICAL RECORD NUMBER:  [id number]  Place of Service where encounter took place:  [facility] ([facility]) [[id number]]    PRIMARY CARE PROVIDER AND CLINIC RESPONSIBLE AFTER TRANSFER:   Arlin Young MD, [facility] [address] / [place]*    Direct transfer to [facility]     Transferring providers: Luz Dominguez PA-C, Brit Kinney MD  Recent Hospitalization/ED:  [facility] Hospital stay 4/4/23 to 4/7/23.  Date of SNF Admission: April 07, 2023  Date of SNF (anticipated) Discharge: April 13, 2023  Discharged to: New TCU  DME: No new DME needed    CODE STATUS/ADVANCE DIRECTIVES DISCUSSION:  Full  ALLERGIES: Hydrochlorothiazide, Codeine, Contrast dye, Diagnostic x-ray materials, Levofloxacin, and Sulfa drugs    NURSING FACILITY COURSE   Medication Changes/Rationale:     None    Summary of nursing facility stay:     Debbie Boucher is a 77-year-old female with past medical history of hypertension, hypothyroidism, migraines, depression, anxiety who was recently hospitalized at [facility] for T12 compression fracture.  Suffered a fall while losing her balance at home with immediate onset of mid-low back pain.  CT of the thoracic and lumbar spine was notable for endplate T12 compression fracture with 30% loss and 2 mm of  retropulsion.  Assessed by neurosurgery who recommended TLSO brace and repeat imaging in 6 weeks.  Initiated on multimodal pain regimen.  Assessed by physical therapy and discharged to TCU.    Patient is electing to transfer to alternative TCU    Fall  T12 compression fracture:  - Continue TLSO brace when out of bed.  No bending/twisting.  Has follow-up with neurosurgery 5/18  - Continue pain control with scheduled Tylenol and as needed oxycodone.    -Started on gabapentin 200 mg 3 times daily in the hospital.  Current end  date for 414.  Will leave end date in place and monitor  -Started vitamin D 2000 units daily on TCU admission  - Follow-up with primary care provider for osteoporosis management    Hypertension: BP well controlled  - Continue lisinopril    Migraine: Denies recent issues with migraines  - Maxalt remains available as needed    Depression  Anxiety;  -Continue Prozac and Wellbutrin    Heart Murmur: Noted per hospital discharge paperwork.    I do see PCP was evaluating for possible syncope with Zio patch.  Could not see previous TTE  - Follow-up with PCP, consider TTE as outpatient    Discharge Medications:  MED REC REQUIRED  Post Medication Reconciliation Status: discharge medications reconciled and changed, per note/orders       Current Outpatient Medications   Medication Sig Dispense Refill     acetaminophen (TYLENOL) 325 MG tablet Take 3 tablets (975 mg) by mouth every 8 hours for 7 days       buPROPion (WELLBUTRIN XL) 150 MG 24 hr tablet Take 150 mg by mouth every morning       FLUoxetine (PROZAC) 40 MG capsule Take 40 mg by mouth daily       gabapentin (NEURONTIN) 100 MG capsule Take 2 capsules (200 mg) by mouth 3 times daily for 7 days       LEVOTHYROXINE SODIUM PO Take 75 mcg by mouth daily       lisinopril (ZESTRIL) 20 MG tablet Take 20 mg by mouth daily       oxyCODONE (ROXICODONE) 5 MG tablet Take 0.5 tablets (2.5 mg) by mouth every 4 hours as needed for moderate pain 20 tablet 0     multivitamin w/minerals (THERA-VIT-M) tablet Take 1 tablet by mouth daily       omeprazole (PRILOSEC) 40 MG DR capsule Take 40 mg by mouth daily       polyethylene glycol (MIRALAX) 17 GM/Dose powder Take 17 g by mouth 2 times daily as needed for constipation 510 g      Rizatriptan Benzoate (MAXALT PO) Take 10 mg by mouth once as needed for migraine       TRAZODONE HCL PO Take 100 mg by mouth At Bedtime             Controlled medications:   Oxycodone 2.5 mg sent to [facility] pharmacy     Past Medical History:   Past Medical History:  "  Diagnosis Date     Anxiety      Depressive disorder      Migraines      Physical Exam:   Vitals: /58   Pulse 63   Temp 97.8  F (36.6  C)   Resp 14   Ht 1.549 m (5' 1\")   Wt 43.4 kg (95 lb 9.6 oz)   SpO2 93%   BMI 18.06 kg/m    BMI: Body mass index is 18.06 kg/m .  Physical Exam  Constitutional:       Appearance: Normal appearance.   HENT:      Head: Normocephalic and atraumatic.   Cardiovascular:      Rate and Rhythm: Normal rate and regular rhythm.      Heart sounds: Murmur heard.   Pulmonary:      Breath sounds: No wheezing.   Musculoskeletal:      Right lower leg: No edema.      Left lower leg: No edema.   Skin:     General: Skin is warm and dry.      Findings: No rash.   Neurological:      General: No focal deficit present.      Mental Status: She is alert.   Psychiatric:         Mood and Affect: Mood normal.           SNF labs: Recent labs in [facility] reviewed by me today.     DISCHARGE PLAN:    Follow up labs: No labs orders/due    Medical Follow Up:      Follow up with TCU provider    [facility] scheduled appointments:  Next 5 appointments (look out 90 days)    May 18, 2023 11:30 AM  Return Visit with Mayuri Li PA-C  [facility] [place] ([facility] ) [address] [address] [address] [phone]        TOTAL DISCHARGE TIME:   Greater than 30 minutes  Electronically signed by:  Luz Dominguez PA-C               "

## 2023-04-14 ENCOUNTER — TRANSITIONAL CARE UNIT VISIT (OUTPATIENT)
Dept: GERIATRICS | Facility: CLINIC | Age: 78
End: 2023-04-14
Payer: MEDICARE

## 2023-04-14 VITALS
RESPIRATION RATE: 18 BRPM | HEART RATE: 64 BPM | DIASTOLIC BLOOD PRESSURE: 72 MMHG | HEIGHT: 61 IN | BODY MASS INDEX: 17.18 KG/M2 | OXYGEN SATURATION: 94 % | SYSTOLIC BLOOD PRESSURE: 163 MMHG | TEMPERATURE: 98.1 F | WEIGHT: 91 LBS

## 2023-04-14 DIAGNOSIS — K59.01 SLOW TRANSIT CONSTIPATION: ICD-10-CM

## 2023-04-14 DIAGNOSIS — R01.1 HEART MURMUR: ICD-10-CM

## 2023-04-14 DIAGNOSIS — R53.81 PHYSICAL DECONDITIONING: ICD-10-CM

## 2023-04-14 DIAGNOSIS — S22.081A BURST FRACTURE OF T12 VERTEBRA (H): Primary | ICD-10-CM

## 2023-04-14 DIAGNOSIS — K21.9 GASTROESOPHAGEAL REFLUX DISEASE, UNSPECIFIED WHETHER ESOPHAGITIS PRESENT: ICD-10-CM

## 2023-04-14 DIAGNOSIS — I10 BENIGN ESSENTIAL HYPERTENSION: ICD-10-CM

## 2023-04-14 DIAGNOSIS — F41.9 ANXIETY: ICD-10-CM

## 2023-04-14 DIAGNOSIS — E03.9 HYPOTHYROIDISM, UNSPECIFIED TYPE: ICD-10-CM

## 2023-04-14 DIAGNOSIS — D64.9 ACUTE ANEMIA: ICD-10-CM

## 2023-04-14 DIAGNOSIS — G43.909 MIGRAINE WITHOUT STATUS MIGRAINOSUS, NOT INTRACTABLE, UNSPECIFIED MIGRAINE TYPE: ICD-10-CM
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
## 2023-04-14 DIAGNOSIS — F32.A DEPRESSION, UNSPECIFIED DEPRESSION TYPE: ICD-10-CM

## 2023-04-14 PROCEDURE — 99310 SBSQ NF CARE HIGH MDM 45: CPT | Performed by: NURSE PRACTITIONER

## 2023-04-14 RX ORDER — GABAPENTIN 100 MG/1
200 CAPSULE ORAL 3 TIMES DAILY
Start: 2023-04-14 | End: 2023-08-07

## 2023-04-14 RX ORDER — SENNA AND DOCUSATE SODIUM 50; 8.6 MG/1; MG/1
2 TABLET, FILM COATED ORAL DAILY PRN
Start: 2023-04-14 | End: 2023-08-07

## 2023-04-14 RX ORDER — CHOLECALCIFEROL (VITAMIN D3) 50 MCG
1 TABLET ORAL DAILY
Start: 2023-04-14

## 2023-04-14 NOTE — PATIENT INSTRUCTIONS
Orders  Debbie Boucher  1945  1) Administer senna s 2 tabs STAT now, then daily PRN for constipation  2)  If no BM by tonight administer suppository per JAC. Update provider if not effective  3) CBC, vitamin B12, folic acid level 4/18. Diagnosis: anemia  Vanna Overton, APRN CNP on 4/14/2023 at 10:24 AM

## 2023-04-14 NOTE — PROGRESS NOTES
[facility] GERIATRICS    PRIMARY CARE PROVIDER AND CLINIC:  Arlin Young MD, [facility] [address] / [place]*  Chief Complaint   Patient presents with     Hospital F/U      [place] Medical Record Number:  [id number]  Place of Service where encounter took place:  [facility]  ([facility]) [[id number]]    Debbie Boucher  is a 77 year old  (1945), [facility]   HPI:    PMH significant for HTN, hypothyroidism, migraines, depression, anxiety, GERD, history of gastric bypass surgery.     Summary of recent hospitalization:   Patient recently hospitalized 4/4-4/7/2023 at [facility] for T12 compression fracture secondary to fall. Laboratory evaluation on admission unremarkable BMP and CBC. CT pelvis negative for acute fracture.  CT thoracic spine and lumbar spine reveal acute T12 burst fracture with mild height loss and minimal retropulsion. Neurosurgery was consulted and recommended TLSO. Avoid twisting, bending and heavy lifting. Follow-up with neurosurgery outpatient in 6 weeks with xray. Follow up with PCP for osteoporosis management outpatient.  Hemoglobin dropped inpatient to 11.2 on 4/5/2023, likely secondary to fracture.  Also noted to have elevation of transaminases, alkaline phosphatase 115, ALT 42, AST 58 on 4/5/2023. Discharged to U for physical rehabilitation and medical management. Patient then decided to transfer to [facility] on 4/13/2023.    Reviewed ER note, H&P, consultant notes and discharge summary from recent hospitalization, as well as recent provider note.    Patient was seen today sitting up in bed.  She is oriented x3.  She has her TLSO brace in place.  Reports pain currently is 7-8/10, as she just finished working with therapy.  We reviewed her pain regimen and she would like to continue with the gabapentin and remove the end date at this time.  She denies any radiation of pain down  her legs.  She denies shortness of breath, chest pain, dizziness/lightheadedness.  She tells me her last bowel movement was 7 days ago.  She denies abdominal pain, nausea.  Reports her appetite is okay, which is her baseline.  She denies dysuria/trouble voiding.  She tells me that she lives in [place] in a condo with her 2 cats alone.  She has family that lives nearby and good support system.  We reviewed what to expect in the TCU.    Reviewed facility EMR including medications, recent nursing progress notes, vital signs.  Discussed patient with nursing staff including plan of care as below.    Spoke to daughterJen and updated on plan of care, answered her questions.    CODE STATUS/ADVANCE DIRECTIVES DISCUSSION:  CPR/Full code   ALLERGIES:   Allergies   Allergen Reactions     Hydrochlorothiazide Hives     Codeine      Contrast Dye      Diagnostic X-Ray Materials Itching     Levofloxacin      Sulfa Drugs Itching     topical      PAST MEDICAL HISTORY:   Past Medical History:   Diagnosis Date     Anxiety      Depressive disorder      Migraines       PAST SURGICAL HISTORY:   has a past surgical history that includes ENT surgery; appendectomy; Cholecystectomy; hernia repair; GYN surgery; orthopedic surgery; and Abdomen surgery.  FAMILY HISTORY: family history is not on file.  SOCIAL HISTORY:     Patient's living condition: lives alone    Post Discharge Medication Reconciliation Status:   MED REC REQUIRED  Post Medication Reconciliation Status:  Discharge medications reconciled and changed, see notes/orders    Current Outpatient Medications   Medication Sig     acetaminophen (TYLENOL) 325 MG tablet Take 3 tablets (975 mg) by mouth every 8 hours for 7 days     buPROPion (WELLBUTRIN XL) 150 MG 24 hr tablet Take 150 mg by mouth every morning     FLUoxetine (PROZAC) 40 MG capsule Take 40 mg by mouth daily     gabapentin (NEURONTIN) 100 MG capsule Take 2 capsules (200 mg) by mouth 3 times daily     LEVOTHYROXINE SODIUM  "PO Take 75 mcg by mouth daily     lisinopril (ZESTRIL) 20 MG tablet Take 20 mg by mouth daily     multivitamin w/minerals (THERA-VIT-M) tablet Take 1 tablet by mouth daily     omeprazole (PRILOSEC) 40 MG DR capsule Take 40 mg by mouth daily     oxyCODONE (ROXICODONE) 5 MG tablet Take 0.5 tablets (2.5 mg) by mouth every 4 hours as needed for moderate pain     polyethylene glycol (MIRALAX) 17 GM/Dose powder Take 17 g by mouth 2 times daily as needed for constipation     Rizatriptan Benzoate (MAXALT PO) Take 10 mg by mouth once as needed for migraine     SENNA-docusate sodium (SENNA S) 8.6-50 MG tablet Take 2 tablets by mouth daily as needed (constipation)     TRAZODONE HCL PO Take 100 mg by mouth At Bedtime      vitamin D3 (CHOLECALCIFEROL) 50 mcg (2000 units) tablet Take 1 tablet (50 mcg) by mouth daily     No current facility-administered medications for this visit.       ROS:  10 point ROS of systems including Constitutional, Eyes, Respiratory, Cardiovascular, Gastroenterology, Genitourinary, Integumentary, Musculoskeletal, Psychiatric were all negative except for pertinent positives noted in my HPI.    Vitals:  BP (!) 163/72   Pulse 64   Temp 98.1  F (36.7  C)   Resp 18   Ht 1.549 m (5' 1\")   Wt 41.3 kg (91 lb)   SpO2 94%   BMI 17.19 kg/m    Exam:  GENERAL APPEARANCE:  Alert, in NAD  HEENT: normocephalic, moist mucous membranes, nose without drainage or crusting  RESP:  respiratory effort normal, no respiratory distress, Lung sounds clear, patient is on RA  CV: auscultation of heart done, rate and rhythm regular.   ABDOMEN: + bowel sounds, soft, nontender, no grimacing or guarding with palpation.  M/S: no lower extremity edema; TLSO brace in place  SKIN:  Inspection and palpation of skin and subcutaneous tissue: skin warm, dry without rashes  NEURO: cranial nerves 2-12 grossly intact and at patient's baseline; moves extremities freely, no focal weakness  PSYCH: oriented x 3, affect and mood " normal      Lab/Diagnostic data:  [facility]F [facility] Please refer to them using EPIC/Care Everywhere. and Recent labs in [facility] reviewed by me today.     ASSESSMENT/PLAN:  T12 compression fracture  Fall, subsequent encounter  -neurosurgery recommended non operative management  -reports severe pain at times, but reports it is managed  Plan: Continue tylenol 975 mg q8h scheduled, oxycodone 2.5-5 mg q4h PRN, gabapentin 200 mg TID indefinitely for now. Continue vitamin D3 daily. Continue TLSO. Avoid twisting, bending and heavy lifting. Follow-up with neurosurgery outpatient in 6 weeks with xray. Follow up with PCP for osteoporosis management outpatient.    Acute anemia  -secondary to fracture, though with macrocytosis for several years  -baseline hemoglobin 12  -hemoglobin 11.2 on 4/5/2023  Plan: CBC, vitamin B12, folic acid level 4/18.    Essential hypertension  -BP fair control, suspect elevation is due to pain  Plan: Continue lisinopril 20 mg daily. VS per policy. Adjust medications as needed.    Heart murmur  -Noted inpatient as having a new systolic murmur on exam  Plan: Follow-up outpatient for further evaluation.    Migraine  -reports occasional headaches, usually improve after eating  Plan: Continue maxalt 10 mg daily PRN. Monitor headaches    Depression  Anxiety  Plan: Continue fluoxetine 40 mg daily, bupropion  mg daily, trazodone 100 mg at bedtime. Monitor mood and symptoms. ACP referral PRN.    Hypothyroidism  -TSH 3.91 on 4/29/2022  Plan: Continue levothyroxine 75 mcg daily.     GERD  Plan: Continue omeprazole 40 mg daily.    Slow transit constipation  -no BM x7 days  Plan: Administer senna s 2 tabs STAT now, then daily PRN. Continue miralax 17 gram daily. If no BM by tonight administer suppository. Monitor bowels.    History of gastric bypass  BMI 17.19  -daughter reports she has been struggling with weight loss, doesn't eat well at home per discussion with daughter, gets meals on  wheels  Plan: Continue juice house supplement BID.    Physical deconditioning  -secondary to fracture  Plan: Encourage participation in physical therapy/occupational therapy for strengthening and deconditioning. Discharge planning per their recommendation. Social work to assist with d/c planning.    Incidental finding  Lung, pleural scarring noted on CT that could represent early fibrosis  Plan: Follow-up outpatient for further evaluation.      Disclaimer: This note may contain text created using speech-recognition software and may contain unintended word substitutions.       Electronically signed by:  JAYCEE Kunz CNP

## 2023-04-14 NOTE — PROGRESS NOTES
[facility] GERIATRIC SERVICES  INITIAL VISIT NOTE  April 17, 2023    PRIMARY CARE PROVIDER AND CLINIC:  Bergeron, Wendy M [facility] [address] / [place]    CHIEF COMPLAINT:  Hospital follow-up/Initial visit    HPI:    Debbie Boucher is a 77 year old  (1945) female [facility].     Medical history is notable for hypertension, cardiac murmur, hypothyroidism, GERD, migraine, anxiety, depression, osteoporosis, and gastric bypass surgery.    Summary of hospital course:  Patient was hospitalized at [facility] from April 4 through April 7, 2023 for fall resulting in T12 compression fracture.  Imaging studies revealed acute T12 burst fracture, moderate biapical pleural-parenchymal scarring, significant common bile duct dilation, and no pelvic/hip fracture.  Neurosurgery recommended TLSO brace.  LFT was also slightly abnormal with ALT of 42, AST of 58, and alkaline phosphatase of 115. Notably, hemoglobin dropped to 11.2 from initial 12.3.   [facility].  She was not content with her care at [facility], therefore she was transferred to this facility on April 13, 2023 for ongoing rehab and nursing care.    Of note, history was obtained from patient, facility RN, and extensive review of the chart.    Today's visit:  Patient was seen in her room, while lying in bed.  She appears frail but comfortable.  Her back pain was well controlled overnight but this morning she rates her back pain at 8 out of 10.  She has no radicular pain.  She had a bowel movement yesterday and she reports no melena or hematochezia.  She denies fever, chills, chest pain, palpitation, dyspnea, nausea, vomiting, abdominal pain, or urinary symptoms.      CODE STATUS:   CPR/Full code     PAST MEDICAL HISTORY:   Fall in April 2023 resulting in T12 compression fracture  Common bile duct  dilation, per CT on April 4, 2023  Moderate biapical pleural-parenchymal scarring, per CT chest on April 4, 2023  Hypertension  Cardiac murmur  Hypothyroidism  GERD  Migraine  Anxiety  Depression  Gastric bypass surgery in 2003 or 2004  Osteoporosis  Hearing loss    Past Medical History:   Diagnosis Date     Anxiety      Depressive disorder      Migraines        PAST SURGICAL HISTORY:   Past Surgical History:   Procedure Laterality Date     APPENDECTOMY       CHOLECYSTECTOMY       ENT SURGERY      t and a      GENITOURINARY SURGERY      bladder neck suspension     GYN SURGERY      hysterectomy     HERNIA REPAIR       ORTHOPEDIC SURGERY         FAMILY HISTORY:   Family history is significant for heart failure and diabetes in her mother, VSD in her father, diabetes in her son, and hypothyroidism in her daughter.    SOCIAL HISTORY:  Social History     Tobacco Use     Smoking status: Not on file     Smokeless tobacco: Not on file   Vaping Use     Vaping status: Not on file   Substance Use Topics     Alcohol use: Not on file       MEDICATIONS:  Current Outpatient Medications   Medication Sig Dispense Refill     acetaminophen (TYLENOL) 325 MG tablet Take 3 tablets (975 mg) by mouth every 8 hours for 7 days       buPROPion (WELLBUTRIN XL) 150 MG 24 hr tablet Take 150 mg by mouth every morning       FLUoxetine (PROZAC) 40 MG capsule Take 40 mg by mouth daily       gabapentin (NEURONTIN) 100 MG capsule Take 2 capsules (200 mg) by mouth 3 times daily       LEVOTHYROXINE SODIUM PO Take 75 mcg by mouth daily       lisinopril (ZESTRIL) 20 MG tablet Take 20 mg by mouth daily       multivitamin w/minerals (THERA-VIT-M) tablet Take 1 tablet by mouth daily       omeprazole (PRILOSEC) 40 MG DR capsule Take 40 mg by mouth daily       oxyCODONE (ROXICODONE) 5 MG tablet Take 0.5 tablets (2.5 mg) by mouth every 4 hours as needed for moderate pain 20 tablet 0     polyethylene glycol (MIRALAX) 17 GM/Dose powder Take 17 g by mouth 2 times  "daily as needed for constipation 510 g      Rizatriptan Benzoate (MAXALT PO) Take 10 mg by mouth once as needed for migraine       SENNA-docusate sodium (SENNA S) 8.6-50 MG tablet Take 2 tablets by mouth daily as needed (constipation)       TRAZODONE HCL PO Take 100 mg by mouth At Bedtime        vitamin D3 (CHOLECALCIFEROL) 50 mcg (2000 units) tablet Take 1 tablet (50 mcg) by mouth daily         MED REC REQUIRED  Post Medication Reconciliation Status: medication reconcilation previously completed during another office visit      ALLERGIES:  Allergies   Allergen Reactions     Hydrochlorothiazide Hives     Codeine      Contrast Dye      Diagnostic X-Ray Materials Itching     Levofloxacin      Sulfa Drugs Itching     topical       ROS:  10 point ROS were negative other than the symptoms noted above in the HPI.    PHYSICAL EXAM:  Vital signs were reviewed in the chart.  Vital Signs: BP (!) 143/63   Pulse 67   Temp 98.6  F (37  C)   Resp 18   Ht 1.549 m (5' 1\")   Wt 41.3 kg (91 lb)   SpO2 92%   BMI 17.19 kg/m    General: Frail appearing but comfortable and in no acute distress  HEENT: Mild conjunctival pallor, no scleral icterus or injection, moist oral mucosa  Cardiovascular: Normal S1, S2, RRR, grade 2/6 murmur  Respiratory: Lungs clear to auscultation bilaterally  GI: Abdomen soft, non-tender, non-distended, +BS  Extremities: No LE edema  Neuro: CX II-XII grossly intact; ROM in all four extremities grossly intact  Psych: Alert and oriented x3; normal affect  Skin: No acute rash    LABORATORY/IMAGING DATA:  All relevant labs and imaging data in [facility] and/or Care Everywhere were personally reviewed today.      Most Recent 3 CBC's:Recent Labs   Lab Test 04/07/23  0545 04/05/23  0742 04/04/23  1649 02/01/17  1537   WBC  --  4.4 6.5 6.6   HGB  --  11.2* 12.3 14.6   MCV  --  106* 102* 97    185 225 230     Most Recent 3 BMP's:Recent Labs   Lab Test 04/05/23  0742 04/04/23  1649 02/01/17  1704    141 142 "   POTASSIUM 4.0 4.8 3.8   CHLORIDE 103 105 104   CO2 29 27 31   BUN 17.1 18.4 9   CR 0.70 0.74 0.71   ANIONGAP 6* 9 7   NAMAN 8.7* 9.5 8.3*   GLC 97 111* 94     Most Recent 2 LFT's:Recent Labs   Lab Test 04/05/23  0742 04/04/23  1649   AST 58* 29   ALT 42* 21   ALKPHOS 115* 81   BILITOTAL 0.4 0.2         ASSESSMENT/PLAN:  Mechanical fall, subsequent encounter,  Acute T12 compression fracture, subsequent encounter,  Age-related osteoporosis with current pathologic fracture,  Physical deconditioning.  Patient is hemodynamically stable.  Moderate-severe pain in her back this morning  Plan:  Fall precautions  Continue pain management with scheduled acetaminophen 975 mg every 8 hours, gabapentin 200 mg 3 times daily, and PRN oxycodone 2.5-5 mg every 4 hours PRN  Management of osteoporosis as outpatient  Continue PT/OT evaluation and therapy    Macrocytic anemia,  History of gastric bypass surgery in 2003 or 2004.  Baseline hemoglobin around 12 with chronic macrocytosis.  Last CBC on April 5 with hemoglobin of 11.2 (down from 12.3 on April 4) and MCV of 106.  Plan:  B12 and folate level on April 18 as ordered  Monitor hemoglobin periodically (next CBC on April 18)    Common bile duct dilation,  Abnormal LFT.  Biliary dilation noted on CT from April 4, 2023.  Plan:  Monitor LFT periodically  Further work-up as outpatient    Moderate biapical pleural-parenchymal scarring.  Noted per CT chest on April 4, 2023.  Plan:  Follow-up as outpatient    Essential hypertension.  Blood pressure is fairly controlled.  Plan:  Continue lisinopril 20 mg daily  Monitor blood pressure     Hypothyroidism.  Last TSH was 3.91 on April 29, 2022.    Plan:  Continue levothyroxine 75 mcg daily    GERD.  Plan:  Continue omeprazole 40 mg daily    Anxiety,  Depression,  Insomnia.  Plan:  Continue bupropion  mg daily  Continue fluoxetine 40 mg daily  Continue trazodone 100 mg at bedtime  Monitor symptoms  Refer to ACP PRN    Slow transit  constipation.  Plan:  Continue the bowel regimen          Orders written by provider at facility:  None.      Disclaimer: This note may contain text created using speech-recognition software and may contain unintended word substitutions.      Electronically signed by:  Roxy Puga MD

## 2023-04-16 ENCOUNTER — LAB REQUISITION (OUTPATIENT)
Dept: LAB | Facility: CLINIC | Age: 78
End: 2023-04-16
Payer: MEDICARE

## 2023-04-16 VITALS
HEIGHT: 61 IN | SYSTOLIC BLOOD PRESSURE: 143 MMHG | DIASTOLIC BLOOD PRESSURE: 63 MMHG | RESPIRATION RATE: 18 BRPM | OXYGEN SATURATION: 92 % | BODY MASS INDEX: 17.18 KG/M2 | WEIGHT: 91 LBS | TEMPERATURE: 98.6 F | HEART RATE: 67 BPM

## 2023-04-16 DIAGNOSIS — D64.9 ANEMIA, UNSPECIFIED: ICD-10-CM

## 2023-04-17 ENCOUNTER — TRANSITIONAL CARE UNIT VISIT (OUTPATIENT)
Dept: GERIATRICS | Facility: CLINIC | Age: 78
End: 2023-04-17
Payer: MEDICARE

## 2023-04-17 ENCOUNTER — LAB REQUISITION (OUTPATIENT)
Dept: LAB | Facility: CLINIC | Age: 78
End: 2023-04-17
Payer: MEDICARE

## 2023-04-17 DIAGNOSIS — K59.01 SLOW TRANSIT CONSTIPATION: ICD-10-CM

## 2023-04-17 DIAGNOSIS — K83.8 COMMON BILE DUCT DILATION: ICD-10-CM
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
## 2023-04-17 DIAGNOSIS — U07.1 COVID-19: ICD-10-CM

## 2023-04-17 DIAGNOSIS — R79.89 ABNORMAL LFTS: ICD-10-CM

## 2023-04-17 DIAGNOSIS — R53.81 PHYSICAL DECONDITIONING: ICD-10-CM

## 2023-04-17 DIAGNOSIS — F41.9 ANXIETY: ICD-10-CM

## 2023-04-17 DIAGNOSIS — D53.9 MACROCYTIC ANEMIA: ICD-10-CM

## 2023-04-17 DIAGNOSIS — M80.00XD AGE-RELATED OSTEOPOROSIS WITH CURRENT PATHOLOGICAL FRACTURE WITH ROUTINE HEALING, SUBSEQUENT ENCOUNTER: ICD-10-CM

## 2023-04-17 DIAGNOSIS — E03.9 HYPOTHYROIDISM, UNSPECIFIED TYPE: ICD-10-CM

## 2023-04-17 DIAGNOSIS — Z98.84 H/O GASTRIC BYPASS: ICD-10-CM

## 2023-04-17 DIAGNOSIS — K21.9 GASTROESOPHAGEAL REFLUX DISEASE, UNSPECIFIED WHETHER ESOPHAGITIS PRESENT: ICD-10-CM

## 2023-04-17 DIAGNOSIS — F32.A DEPRESSION, UNSPECIFIED DEPRESSION TYPE: ICD-10-CM

## 2023-04-17 DIAGNOSIS — I10 ESSENTIAL HYPERTENSION: ICD-10-CM

## 2023-04-17 DIAGNOSIS — S22.080D COMPRESSION FRACTURE OF T12 VERTEBRA WITH ROUTINE HEALING, SUBSEQUENT ENCOUNTER: ICD-10-CM

## 2023-04-17 LAB
ERYTHROCYTE [DISTWIDTH] IN BLOOD BY AUTOMATED COUNT: 11.3 % (ref 10–15)
FOLATE SERPL-MCNC: 28.4 NG/ML (ref 4.6–34.8)
HCT VFR BLD AUTO: 35.6 % (ref 35–47)
HGB BLD-MCNC: 11.4 G/DL (ref 11.7–15.7)
MCH RBC QN AUTO: 34 PG (ref 26.5–33)
MCHC RBC AUTO-ENTMCNC: 32 G/DL (ref 31.5–36.5)
MCV RBC AUTO: 106 FL (ref 78–100)
PLATELET # BLD AUTO: 259 10E3/UL (ref 150–450)
RBC # BLD AUTO: 3.35 10E6/UL (ref 3.8–5.2)
VIT B12 SERPL-MCNC: 289 PG/ML (ref 232–1245)
WBC # BLD AUTO: 3.7 10E3/UL (ref 4–11)

## 2023-04-17 PROCEDURE — 86481 TB AG RESPONSE T-CELL SUSP: CPT | Performed by: NURSE PRACTITIONER

## 2023-04-17 PROCEDURE — 85027 COMPLETE CBC AUTOMATED: CPT | Performed by: NURSE PRACTITIONER

## 2023-04-17 PROCEDURE — U0005 INFEC AGEN DETEC AMPLI PROBE: HCPCS | Performed by: NURSE PRACTITIONER

## 2023-04-17 PROCEDURE — 82746 ASSAY OF FOLIC ACID SERUM: CPT | Performed by: NURSE PRACTITIONER

## 2023-04-17 PROCEDURE — 99305 1ST NF CARE MODERATE MDM 35: CPT | Performed by: INTERNAL MEDICINE

## 2023-04-17 PROCEDURE — 36415 COLL VENOUS BLD VENIPUNCTURE: CPT | Performed by: NURSE PRACTITIONER

## 2023-04-17 PROCEDURE — 82607 VITAMIN B-12: CPT | Performed by: NURSE PRACTITIONER

## 2023-04-18 DIAGNOSIS — S22.081A BURST FRACTURE OF T12 VERTEBRA (H): ICD-10-CM

## 2023-04-18 LAB
GAMMA INTERFERON BACKGROUND BLD IA-ACNC: 0.05 IU/ML
M TB IFN-G BLD-IMP: NEGATIVE
M TB IFN-G CD4+ BCKGRND COR BLD-ACNC: 9.95 IU/ML
MITOGEN IGNF BCKGRD COR BLD-ACNC: 0 IU/ML
MITOGEN IGNF BCKGRD COR BLD-ACNC: 0 IU/ML
QUANTIFERON MITOGEN: 10 IU/ML
QUANTIFERON NIL TUBE: 0.05 IU/ML
QUANTIFERON TB1 TUBE: 0.05 IU/ML
QUANTIFERON TB2 TUBE: 0.05
SARS-COV-2 RNA RESP QL NAA+PROBE: NEGATIVE

## 2023-04-18 RX ORDER — OXYCODONE HYDROCHLORIDE 5 MG/1
2.5 TABLET ORAL EVERY 4 HOURS PRN
Qty: 20 TABLET | Refills: 0 | Status: SHIPPED | OUTPATIENT
Start: 2023-04-18 | End: 2023-04-20

## 2023-04-20 ENCOUNTER — LAB REQUISITION (OUTPATIENT)
Dept: LAB | Facility: CLINIC | Age: 78
End: 2023-04-20
Payer: MEDICARE

## 2023-04-20 ENCOUNTER — DISCHARGE SUMMARY NURSING HOME (OUTPATIENT)
Dept: GERIATRICS | Facility: CLINIC | Age: 78
End: 2023-04-20
Payer: MEDICARE

## 2023-04-20 VITALS
TEMPERATURE: 98.2 F | OXYGEN SATURATION: 93 % | HEART RATE: 66 BPM | RESPIRATION RATE: 18 BRPM | SYSTOLIC BLOOD PRESSURE: 165 MMHG | BODY MASS INDEX: 17.56 KG/M2 | HEIGHT: 61 IN | DIASTOLIC BLOOD PRESSURE: 82 MMHG | WEIGHT: 93 LBS

## 2023-04-20 DIAGNOSIS — U07.1 COVID-19: ICD-10-CM

## 2023-04-20 DIAGNOSIS — Z98.84 H/O GASTRIC BYPASS: ICD-10-CM

## 2023-04-20 DIAGNOSIS — S22.081A BURST FRACTURE OF T12 VERTEBRA (H): Primary | ICD-10-CM
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
## 2023-04-20 DIAGNOSIS — K83.8 COMMON BILE DUCT DILATION: ICD-10-CM

## 2023-04-20 DIAGNOSIS — G43.909 MIGRAINE WITHOUT STATUS MIGRAINOSUS, NOT INTRACTABLE, UNSPECIFIED MIGRAINE TYPE: ICD-10-CM

## 2023-04-20 DIAGNOSIS — F41.9 ANXIETY: ICD-10-CM

## 2023-04-20 DIAGNOSIS — R79.89 ABNORMAL LFTS: ICD-10-CM

## 2023-04-20 DIAGNOSIS — K59.01 SLOW TRANSIT CONSTIPATION: ICD-10-CM

## 2023-04-20 DIAGNOSIS — D64.9 ACUTE ANEMIA: ICD-10-CM

## 2023-04-20 DIAGNOSIS — D53.9 MACROCYTIC ANEMIA: ICD-10-CM

## 2023-04-20 DIAGNOSIS — R01.1 HEART MURMUR: ICD-10-CM

## 2023-04-20 DIAGNOSIS — I10 ESSENTIAL HYPERTENSION: ICD-10-CM

## 2023-04-20 DIAGNOSIS — E03.9 HYPOTHYROIDISM, UNSPECIFIED TYPE: ICD-10-CM

## 2023-04-20 DIAGNOSIS — M80.00XD AGE-RELATED OSTEOPOROSIS WITH CURRENT PATHOLOGICAL FRACTURE WITH ROUTINE HEALING, SUBSEQUENT ENCOUNTER: ICD-10-CM

## 2023-04-20 DIAGNOSIS — R53.81 PHYSICAL DECONDITIONING: ICD-10-CM

## 2023-04-20 DIAGNOSIS — F32.A DEPRESSION, UNSPECIFIED DEPRESSION TYPE: ICD-10-CM

## 2023-04-20 DIAGNOSIS — K21.9 GASTROESOPHAGEAL REFLUX DISEASE, UNSPECIFIED WHETHER ESOPHAGITIS PRESENT: ICD-10-CM

## 2023-04-20 PROCEDURE — 99316 NF DSCHRG MGMT 30 MIN+: CPT | Performed by: NURSE PRACTITIONER

## 2023-04-20 PROCEDURE — U0003 INFECTIOUS AGENT DETECTION BY NUCLEIC ACID (DNA OR RNA); SEVERE ACUTE RESPIRATORY SYNDROME CORONAVIRUS 2 (SARS-COV-2) (CORONAVIRUS DISEASE [COVID-19]), AMPLIFIED PROBE TECHNIQUE, MAKING USE OF HIGH THROUGHPUT TECHNOLOGIES AS DESCRIBED BY CMS-2020-01-R: HCPCS | Performed by: NURSE PRACTITIONER

## 2023-04-20 RX ORDER — OXYCODONE HYDROCHLORIDE 5 MG/1
2.5-5 TABLET ORAL EVERY 4 HOURS PRN
Qty: 20 TABLET | Refills: 0 | Status: SHIPPED | OUTPATIENT
Start: 2023-04-20 | End: 2023-08-07

## 2023-04-20 RX ORDER — ACETAMINOPHEN 325 MG/1
975 TABLET ORAL EVERY 8 HOURS
Start: 2023-04-20 | End: 2023-09-25

## 2023-04-20 RX ORDER — POLYETHYLENE GLYCOL 3350 17 G/17G
17 POWDER, FOR SOLUTION ORAL DAILY
Start: 2023-04-20 | End: 2023-08-07

## 2023-04-20 RX ORDER — FOLIC ACID 1 MG/1
1 TABLET ORAL DAILY
Start: 2023-04-20 | End: 2023-11-22

## 2023-04-20 RX ORDER — UBIDECARENONE 75 MG
100 CAPSULE ORAL DAILY
Start: 2023-04-20

## 2023-04-20 RX ORDER — LISINOPRIL 20 MG/1
20 TABLET ORAL DAILY
COMMUNITY

## 2023-04-20 NOTE — PATIENT INSTRUCTIONS
Orders  Debbie F Sander  1945  1) Start methocarbamol 500 mg TID PRN for pain. Update provider if not effective for pain  2) Start folic acid 1 mg daily for nutritional supplment  3) Increase lisinopril to 30 mg daily. Diagnosis: HTN  4) Start vitamin B12 supplement 100 mcg daily for nutritional supplement  5) OK to discharge home with current medications, orders, and treatment plan. OK for 30 day supply of medications at discharge. OK to send 20 tabs of oxycodone with patient at discharge from TCU. Follow up with PCP in 1-2 weeks. Follow up with specialists : neurosurgery 5/18 as scheduled. Home care to include physical therapy, occupational therapy, RN, HHA.  JAYCEE Kunz CNP on 4/20/2023 at 10:21 AM

## 2023-04-20 NOTE — PROGRESS NOTES
[facility] GERIATRICS DISCHARGE SUMMARY  PATIENT'S NAME: Debbie Boucher  YOB: 1945  MEDICAL RECORD NUMBER:  [id number]  Place of Service where encounter took place:  [facility]  ([facility]) [[id number]]    PRIMARY CARE PROVIDER AND CLINIC RESPONSIBLE AFTER TRANSFER:   Arlin Young MD, [facility] [address] / [place]*    Non-[facility] Provider     Transferring providers: JAYCEE Kunz CNP, Roxy Puga MD  Recent Hospitalization/ED:  [facility] Hospital stay 4/4/23 to 4/7/23.  Date of SNF Admission: April 07, 2023  Date of SNF (anticipated) Discharge: April 22, 2023  Discharged to: home  Cognitive Scores: SLUMS 30/30  Physical Function: Ambulates 300 feet with four-wheel walker standby assist, standby assist for transfers and bed mobility, standby assist for upper and lower body dressing and grooming/hygiene      CODE STATUS/ADVANCE DIRECTIVES DISCUSSION:  Full code  ALLERGIES: Hydrochlorothiazide, Codeine, Contrast dye, Diagnostic x-ray materials, Levofloxacin, and Sulfa drugs    NURSING FACILITY COURSE     PMH significant for HTN, hypothyroidism, migraines, depression, anxiety, GERD, history of gastric bypass surgery.      Summary of recent hospitalization:   Patient recently hospitalized 4/4-4/7/2023 at [facility] for T12 compression fracture secondary to fall. Laboratory evaluation on admission unremarkable BMP and CBC. CT pelvis negative for acute fracture.  CT thoracic spine and lumbar spine reveal acute T12 burst fracture with mild height loss and minimal retropulsion. Neurosurgery was consulted and recommended TLSO. Avoid twisting, bending and heavy lifting. Follow-up with neurosurgery outpatient in 6 weeks with xray. Follow up with PCP for osteoporosis management outpatient.  Hemoglobin dropped inpatient to 11.2 on 4/5/2023, likely secondary to fracture.  Also noted to have elevation of transaminases,  alkaline phosphatase 115, ALT 42, AST 58 on 4/5/2023. Discharged to TCU for physical rehabilitation and medical management. Patient then decided to transfer to [facility] TCU on 4/13/2023.    Summary of nursing facility stay:   Patient continues to have severe pain at times. Denies radiation of pain. Denies SOB, CP, dizziness, lightheadedness. Bowels moving regularly. Denies dysuria trouble voiding.    T12 compression fracture  Age related osteoporosis  Fall, subsequent encounter  -neurosurgery recommended non operative management  -reports severe pain at times, but reports it is managed  -using oxycodone on average 2 times per day  Plan: Start methocarbamol 500 mg TID PRN. Continue tylenol 975 mg q8h scheduled, oxycodone 2.5-5 mg q4h PRN, gabapentin 200 mg TID indefinitely for now. Continue vitamin D3 daily. Continue TLSO. Avoid twisting, bending and heavy lifting. Follow-up with neurosurgery outpatient in 6 weeks with xray. Follow up with PCP for osteoporosis management outpatient.     Acute anemia  -secondary to fracture, though with macrocytosis for several years  -baseline hemoglobin 12  -hemoglobin 11.4 on 4/17/2023  -folate 28.4 and vitamin B12 289 on 4/17/2023- per discussion with dietician due to gastric bypass would recommend supplementation of these nutrients. Folic acid and B12 supplement added.  Plan: CBC PRN     Common bile duct dilation  Abnormal LFT  -biliary dilation noted on CT from 4/4/2023  Plan: CMP through home care 1 week. Further work up outpatient    Essential hypertension  -BP elevated- likely pain is contributing, but having some SBP in 160-170s  Plan: Increase lisinopril 30 mg daily. BMP 1 week through home care. FOLLOW UP with PCP outpatient.     Heart murmur  -Noted inpatient as having a new systolic murmur on exam  Plan: Follow-up outpatient for further evaluation.     Migraine  -reports occasional headaches, usually improve after eating  Plan: Continue maxalt 10 mg daily PRN. Monitor  headaches     Depression  Anxiety  Plan: Continue fluoxetine 40 mg daily, bupropion  mg daily, trazodone 100 mg at bedtime. Monitor mood and symptoms. Follow up with PCP     Hypothyroidism  -TSH 3.91 on 4/29/2022  Plan: Continue levothyroxine 75 mcg daily.      GERD  Plan: Continue omeprazole 40 mg daily.     Slow transit constipation  -reports bowels moving regularly  Plan: Continue senna s 2 tabs daily PRN. Continue miralax 17 gram daily. Monitor bowels.     History of gastric bypass  BMI 17.19  -daughter reports she has been struggling with weight loss, doesn't eat well at home per discussion with daughter, gets meals on wheels  Plan: Dietician followed in TCU. Start vitamin B12 and folic acid supplement due to absorption issues after gastric bypass per discussion with dietician. Continue juice house supplement BID. Follow up with PCP outpatient     Physical deconditioning  -secondary to fracture  Plan: Encourage participation in physical therapy/occupational therapy for strengthening and deconditioning. Discharge planning per their recommendation. Social work to assist with d/c planning.     Incidental finding  Lung, pleural scarring noted on CT that could represent early fibrosis  Plan: Follow-up outpatient for further evaluation.       Discharge Medications:  MED REC REQUIRED{  Post Medication Reconciliation Status:  Medication reconciliation previously completed during another office visit       Current Outpatient Medications   Medication Sig Dispense Refill     acetaminophen (TYLENOL) 325 MG tablet Take 3 tablets (975 mg) by mouth every 8 hours       buPROPion (WELLBUTRIN XL) 150 MG 24 hr tablet Take 150 mg by mouth every morning       cyanocobalamin (VITAMIN B-12) 100 MCG tablet Take 1 tablet (100 mcg) by mouth daily       FLUoxetine (PROZAC) 40 MG capsule Take 40 mg by mouth daily       folic acid (FOLVITE) 1 MG tablet Take 1 tablet (1 mg) by mouth daily       gabapentin (NEURONTIN) 100 MG capsule  "Take 2 capsules (200 mg) by mouth 3 times daily       LEVOTHYROXINE SODIUM PO Take 75 mcg by mouth daily       lisinopril (ZESTRIL) 30 MG tablet Take 30 mg by mouth daily       multivitamin w/minerals (THERA-VIT-M) tablet Take 1 tablet by mouth daily       omeprazole (PRILOSEC) 40 MG DR capsule Take 40 mg by mouth daily       oxyCODONE (ROXICODONE) 5 MG tablet Take 0.5-1 tablets (2.5-5 mg) by mouth every 4 hours as needed for moderate pain 20 tablet 0     polyethylene glycol (MIRALAX) 17 GM/Dose powder Take 17 g by mouth daily       Rizatriptan Benzoate (MAXALT PO) Take 10 mg by mouth once as needed for migraine       SENNA-docusate sodium (SENNA S) 8.6-50 MG tablet Take 2 tablets by mouth daily as needed (constipation)       TRAZODONE HCL PO Take 100 mg by mouth At Bedtime        vitamin D3 (CHOLECALCIFEROL) 50 mcg (2000 units) tablet Take 1 tablet (50 mcg) by mouth daily          Controlled medications:   Medication: oxycodone  , 20 tabs given to patient at the time of discharge to take home     Past Medical History:   Past Medical History:   Diagnosis Date     Anxiety      Depressive disorder      Migraines      Physical Exam:   Vitals: BP (!) 165/82   Pulse 66   Temp 98.2  F (36.8  C)   Resp 18   Ht 1.549 m (5' 0.98\")   Wt 42.2 kg (93 lb)   SpO2 93%   BMI 17.58 kg/m    BMI: Body mass index is 17.58 kg/m .  GENERAL APPEARANCE:  Alert, in NAD  HEENT: normocephalic, moist mucous membranes, nose without drainage or crusting  RESP:  respiratory effort normal, no respiratory distress, Lung sounds clear, patient is on RA  CV: auscultation of heart done, rate and rhythm regular  ABDOMEN: + bowel sounds, soft, nontender, no grimacing or guarding with palpation.  M/S: no lower extremity edema  NEURO: cranial nerves 2-12 grossly intact and at patient's baseline; no facial asymmetry, no speech deficits and able to follow directions; no focal weakness  PSYCH: affect and mood normal      SNF labs: Labs done in SNF are " in [facility]. Please refer to them using EPIC/Care Everywhere. and Recent labs in [facility] reviewed by me today.     DISCHARGE PLAN:    Follow up labs: CMP 4/27 week through home care with results to PCP    Medical Follow Up:      Follow up with primary care provider in 1-2 weeks and neurosurgery as scheduled    Current [place] scheduled appointments:  Next 5 appointments (look out 90 days)    May 18, 2023 11:30 AM  Return Visit with Mayuri Li PA-C  [facility] [place] ([facility] ) [address] [facility] [address] [place] [id number] [phone]          Discharge Services: Home Care:  Occupational Therapy, Physical Therapy, Registered Nurse, Home Health Aide and From:  [facility]    Discharge Instructions Verbalized to Patient at Discharge:     No bending, twisting or lifting >10 lb    TLSO brace on when out of bed. OK to remove for bathing     Juice supplement twice a day    TOTAL DISCHARGE TIME:   Greater than 30 minutes  Electronically signed by:  JAYCEE Kunz CNP     Home care Face to Face documentation done in [facility] attached to Home care orders for [facility].

## 2023-04-21 LAB — SARS-COV-2 RNA RESP QL NAA+PROBE: NEGATIVE

## 2023-04-27 ENCOUNTER — LAB (OUTPATIENT)
Dept: LAB | Facility: CLINIC | Age: 78
End: 2023-04-27
Payer: MEDICARE

## 2023-04-27 DIAGNOSIS — K83.8 BILE DUCT PROLIFERATION: Primary | ICD-10-CM

## 2023-04-27 LAB
ALBUMIN SERPL BCG-MCNC: 4.1 G/DL (ref 3.5–5.2)
ALP SERPL-CCNC: 116 U/L (ref 35–104)
ALT SERPL W P-5'-P-CCNC: 10 U/L (ref 10–35)
ANION GAP SERPL CALCULATED.3IONS-SCNC: 13 MMOL/L (ref 7–15)
AST SERPL W P-5'-P-CCNC: 23 U/L (ref 10–35)
BILIRUB SERPL-MCNC: 0.3 MG/DL
BUN SERPL-MCNC: 14.3 MG/DL (ref 8–23)
CALCIUM SERPL-MCNC: 9.6 MG/DL (ref 8.8–10.2)
CHLORIDE SERPL-SCNC: 102 MMOL/L (ref 98–107)
CREAT SERPL-MCNC: 0.87 MG/DL (ref 0.51–0.95)
DEPRECATED HCO3 PLAS-SCNC: 26 MMOL/L (ref 22–29)
GFR SERPL CREATININE-BSD FRML MDRD: 69 ML/MIN/1.73M2
GLUCOSE SERPL-MCNC: 111 MG/DL (ref 70–99)
POTASSIUM SERPL-SCNC: 4.3 MMOL/L (ref 3.4–5.3)
PROT SERPL-MCNC: 6.7 G/DL (ref 6.4–8.3)
SODIUM SERPL-SCNC: 141 MMOL/L (ref 136–145)

## 2023-04-27 PROCEDURE — 80053 COMPREHEN METABOLIC PANEL: CPT

## 2023-05-23 ENCOUNTER — ANCILLARY PROCEDURE (OUTPATIENT)
Dept: GENERAL RADIOLOGY | Facility: CLINIC | Age: 78
End: 2023-05-23
Attending: NURSE PRACTITIONER
Payer: MEDICARE

## 2023-05-23 ENCOUNTER — OFFICE VISIT (OUTPATIENT)
Dept: NEUROSURGERY | Facility: CLINIC | Age: 78
End: 2023-05-23
Attending: PHYSICIAN ASSISTANT
Payer: MEDICARE

## 2023-05-23 ENCOUNTER — TELEPHONE (OUTPATIENT)
Dept: NEUROSURGERY | Facility: CLINIC | Age: 78
End: 2023-05-23

## 2023-05-23 VITALS — HEART RATE: 68 BPM | DIASTOLIC BLOOD PRESSURE: 80 MMHG | OXYGEN SATURATION: 97 % | SYSTOLIC BLOOD PRESSURE: 136 MMHG

## 2023-05-23 DIAGNOSIS — S22.081A BURST FRACTURE OF T12 VERTEBRA (H): ICD-10-CM

## 2023-05-23 DIAGNOSIS — S22.081A BURST FRACTURE OF T12 VERTEBRA (H): Primary | ICD-10-CM

## 2023-05-23 PROCEDURE — G0463 HOSPITAL OUTPT CLINIC VISIT: HCPCS | Performed by: PHYSICIAN ASSISTANT

## 2023-05-23 PROCEDURE — 72070 X-RAY EXAM THORAC SPINE 2VWS: CPT | Mod: TC | Performed by: RADIOLOGY

## 2023-05-23 PROCEDURE — 99203 OFFICE O/P NEW LOW 30 MIN: CPT | Performed by: PHYSICIAN ASSISTANT

## 2023-05-23 ASSESSMENT — PAIN SCALES - GENERAL: PAINLEVEL: MILD PAIN (2)

## 2023-05-23 NOTE — Clinical Note
Can we please send handicap parking to patient until next appointment? Follow up in 5 week with XR prior, orders placed Thanks

## 2023-05-23 NOTE — PROGRESS NOTES
NEUROSURGERY CLINIC CONSULT NOTE     DATE OF VISIT: 5/23/2023     SUBJECTIVE:     Debbie Boucher is a 77 year old female with history of osteoporosis who was admitted on 4/4/2023 with back pain s/p mechanical fall. Radiographic findings include an acute T12 burst fracture with approximately 25% vertebral body height loss and minimal retropulsion. Recommendations included TLSO brace, light activity, conservative management. Patient presents to the clinic today for 7 week follow up.     Since discharge, Debbie has been doing well. Her day to day average pain is 2-3 localized in her back around her fracture. Denies radicular symptoms, bowel/bladder changes, saddle anesthesia, weakness. She takes muscle relaxant PRN and gabapentin daily. She does note her balance is still not at baseline however has improved since hospital stay. She has been wearing her brace when out of bed and ambulating and off when sleeping. She asked about taking off to eat as it will ride up and cause her to feel like she is choking.         Current Outpatient Medications:      acetaminophen (TYLENOL) 325 MG tablet, Take 3 tablets (975 mg) by mouth every 8 hours, Disp: , Rfl:      buPROPion (WELLBUTRIN XL) 150 MG 24 hr tablet, Take 150 mg by mouth every morning, Disp: , Rfl:      cyanocobalamin (VITAMIN B-12) 100 MCG tablet, Take 1 tablet (100 mcg) by mouth daily, Disp: , Rfl:      FLUoxetine (PROZAC) 40 MG capsule, Take 40 mg by mouth daily, Disp: , Rfl:      folic acid (FOLVITE) 1 MG tablet, Take 1 tablet (1 mg) by mouth daily, Disp: , Rfl:      gabapentin (NEURONTIN) 100 MG capsule, Take 2 capsules (200 mg) by mouth 3 times daily, Disp: , Rfl:      LEVOTHYROXINE SODIUM PO, Take 75 mcg by mouth daily, Disp: , Rfl:      lisinopril (ZESTRIL) 30 MG tablet, Take 30 mg by mouth daily, Disp: , Rfl:      multivitamin w/minerals (THERA-VIT-M) tablet, Take 1 tablet by mouth daily, Disp: , Rfl:      omeprazole (PRILOSEC) 40 MG DR capsule, Take 40 mg by  mouth daily, Disp: , Rfl:      oxyCODONE (ROXICODONE) 5 MG tablet, Take 0.5-1 tablets (2.5-5 mg) by mouth every 4 hours as needed for moderate pain, Disp: 20 tablet, Rfl: 0     polyethylene glycol (MIRALAX) 17 GM/Dose powder, Take 17 g by mouth daily, Disp: , Rfl:      Rizatriptan Benzoate (MAXALT PO), Take 10 mg by mouth once as needed for migraine, Disp: , Rfl:      SENNA-docusate sodium (SENNA S) 8.6-50 MG tablet, Take 2 tablets by mouth daily as needed (constipation), Disp: , Rfl:      TRAZODONE HCL PO, Take 100 mg by mouth At Bedtime , Disp: , Rfl:      vitamin D3 (CHOLECALCIFEROL) 50 mcg (2000 units) tablet, Take 1 tablet (50 mcg) by mouth daily, Disp: , Rfl:      Allergies   Allergen Reactions     Hydrochlorothiazide Hives     Codeine      Contrast Dye      Iodinated Contrast Media Itching     Levofloxacin      Sulfa Antibiotics Itching     topical        Past Medical History:   Diagnosis Date     Anxiety      Depressive disorder      Migraines         ROS: 10 point review of symptoms are negative other than the symptoms noted above in the HPI.     Family History has been reviewed with the patient, there are no pertinent findings to presenting concern.     Past Surgical History:   Procedure Laterality Date     APPENDECTOMY       CHOLECYSTECTOMY       ENT SURGERY      t and a      GENITOURINARY SURGERY      bladder neck suspension     GYN SURGERY      hysterectomy     HERNIA REPAIR       ORTHOPEDIC SURGERY                OBJECTIVE:   /80   Pulse 68   SpO2 97%    There is no height or weight on file to calculate BMI.     Imaging:     THORACIC SPINE TWO VIEWS  5/23/2023 12:38 PM      COMPARISON: Thoracic spine CT 4/4/2023.     HISTORY: T12 fracture follow-up. Burst fracture of T12 vertebra (H).                                                                      IMPRESSION: Anterior wedge compression fracture deformity of T12 again  noted. There has been continued interval anterior wedging of the  T12  vertebral body with approximately 80% loss of vertebral body height  anteriorly currently. No new fractures. Alignment remains normal.      Exam:   CN II-XII grossly intact, alert and appropriate with conversation and following commands.   Gait is non-antalgic. Unable to tandem walk 2/2 balance. Able to stand on toes and heels without difficulty.   Bilateral bicep 2/4 and tricep reflexes 1/4. Sensation intact throughout upper extremities.     UE muscle strength  Right  Left    Deltoid  5/5  5/5    Biceps  5/5  5/5    Triceps  5/5  5/5    Hand intrinsics  5/5  5/5    Hand grasp  5/5  5/5           TLSO brace in place  Intact sensation throughout lower extremities.   Bilateral patellar 2/4 and achilles reflex 1/4.    LE muscle strength  Right  Left    Iliopsoas (hip flexion)  5/5  5/5    Quad (knee extension)  5/5  5/5    Hamstring (knee flexion)  5/5  5/5    Gastrocnemius (PF)  5/5  5/5    Tibialis Ant. (DF)  5/5  5/5    EHL  5/5  5/5      Negative for clonus.   Calves are soft and non-tender bilaterally.     ASSESSMENT/PLAN:     Debbie Boucher is a 77 year old female with history of osteoporosis who was admitted on 4/4/2023 with back pain s/p mechanical fall. Radiographic findings include an acute T12 burst fracture with approximately 25% vertebral body height loss and minimal retropulsion. Recommendations included TLSO brace, light activity, conservative management. Patient presents to the clinic today for 7 week follow up.     Since discharge, Debbie has been doing well. Her day to day average pain is 2-3 localized in her back around her fracture. Denies radicular symptoms, bowel/bladder changes, saddle anesthesia, weakness. She takes muscle relaxant PRN and gabapentin daily. She does note her balance is still not at baseline however has improved since hospital stay. She has been wearing her brace when out of bed and ambulating and off when sleeping. She asked about taking off to eat as it will ride up and  cause her to feel like she is choking.     Recommendations/plans:   - XR reviewed with Dr. Bailey and with patient and daughter over phone. Patient is doing well, wearing brace as indicated, no new or worsening symptoms. Recommend continuing current plans with brace and follow up in 6 weeks with XR prior. Should there be concerns prior, recommend contacting our office for an earlier appointment. Patient and daughter in agreement. Dr. Bailey in agreement.   -Gradually increase activities as tolerated (I.e., lift additional 2 lbs per week as tolerated)  -Please limit your lifting to no more that ten pounds and avoid excessive bending, twisting and turning at the lumbar spine. You should also avoid excessive jostling and jarring activities.   -Follow up in 5 weeks with XR; our office will coordinate  -OK for gentle massage. NO deep tissue massages  -OK to remove brace for eating.     Respectfully,     Mayuri Li PA-C  [facility]  [address] [address]    Tel [phone]

## 2023-05-23 NOTE — TELEPHONE ENCOUNTER
Patient's daughter Jen called to say she missed a call from our clinic and asked for a call back. Writer does not know who called as there are no notes as of yet.

## 2023-05-23 NOTE — NURSING NOTE
"Debbie Boucher is a 77 year old female who presents for:  Chief Complaint   Patient presents with     Hospital F/U        Vitals:    Vitals:    05/23/23 1246   BP: 136/80   Pulse: 68   SpO2: 97%       BMI:  Estimated body mass index is 17.58 kg/m  as calculated from the following:    Height as of 4/20/23: 5' 0.98\" (1.549 m).    Weight as of 4/20/23: 93 lb (42.2 kg).    Pain Score:  Mild Pain (2)        Amendo Phorn      "

## 2023-05-23 NOTE — PATIENT INSTRUCTIONS
-Gradually increase activities as tolerated (I.e., lift additional 2 lbs per week as tolerated)  -Please limit your lifting to no more that ten pounds and avoid excessive bending, twisting and turning at the lumbar spine. You should also avoid excessive jostling and jarring activities.   -Wean off gabapentin. I will call with instructions.  -Follow up in 5 weeks with XR  -OK for gentle massage. NO deep tissue massages  -OK to remove brace for eating.       Mayuri Li PA-C  [facility]surgery  [address] [address]    Tel [phone]

## 2023-05-23 NOTE — LETTER
5/23/2023         RE: Debbie Boucher  [address] [facility] [address]        Dear Colleague,    Thank you for referring your patient, Debbie Boucher, to the [facility] NEUROSURGERY CLINIC [place]. Please see a copy of my visit note below.    NEUROSURGERY CLINIC CONSULT NOTE     DATE OF VISIT: 5/23/2023     SUBJECTIVE:     Debbie Boucher is a 77 year old female with history of osteoporosis who was admitted on 4/4/2023 with back pain s/p mechanical fall. Radiographic findings include an acute T12 burst fracture with approximately 25% vertebral body height loss and minimal retropulsion. Recommendations included TLSO brace, light activity, conservative management. Patient presents to the clinic today for 7 week follow up.     Since discharge, Debbie has been doing well. Her day to day average pain is 2-3 localized in her back around her fracture. Denies radicular symptoms, bowel/bladder changes, saddle anesthesia, weakness. She takes muscle relaxant PRN and gabapentin daily. She does note her balance is still not at baseline however has improved since hospital stay. She has been wearing her brace when out of bed and ambulating and off when sleeping. She asked about taking off to eat as it will ride up and cause her to feel like she is choking.         Current Outpatient Medications:      acetaminophen (TYLENOL) 325 MG tablet, Take 3 tablets (975 mg) by mouth every 8 hours, Disp: , Rfl:      buPROPion (WELLBUTRIN XL) 150 MG 24 hr tablet, Take 150 mg by mouth every morning, Disp: , Rfl:      cyanocobalamin (VITAMIN B-12) 100 MCG tablet, Take 1 tablet (100 mcg) by mouth daily, Disp: , Rfl:      FLUoxetine (PROZAC) 40 MG capsule, Take 40 mg by mouth daily, Disp: , Rfl:      folic acid (FOLVITE) 1 MG tablet, Take 1 tablet (1 mg) by mouth daily, Disp: , Rfl:      gabapentin (NEURONTIN) 100 MG capsule, Take 2 capsules (200 mg) by mouth 3 times daily, Disp: , Rfl:      LEVOTHYROXINE SODIUM PO,  Take 75 mcg by mouth daily, Disp: , Rfl:      lisinopril (ZESTRIL) 30 MG tablet, Take 30 mg by mouth daily, Disp: , Rfl:      multivitamin w/minerals (THERA-VIT-M) tablet, Take 1 tablet by mouth daily, Disp: , Rfl:      omeprazole (PRILOSEC) 40 MG DR capsule, Take 40 mg by mouth daily, Disp: , Rfl:      oxyCODONE (ROXICODONE) 5 MG tablet, Take 0.5-1 tablets (2.5-5 mg) by mouth every 4 hours as needed for moderate pain, Disp: 20 tablet, Rfl: 0     polyethylene glycol (MIRALAX) 17 GM/Dose powder, Take 17 g by mouth daily, Disp: , Rfl:      Rizatriptan Benzoate (MAXALT PO), Take 10 mg by mouth once as needed for migraine, Disp: , Rfl:      SENNA-docusate sodium (SENNA S) 8.6-50 MG tablet, Take 2 tablets by mouth daily as needed (constipation), Disp: , Rfl:      TRAZODONE HCL PO, Take 100 mg by mouth At Bedtime , Disp: , Rfl:      vitamin D3 (CHOLECALCIFEROL) 50 mcg (2000 units) tablet, Take 1 tablet (50 mcg) by mouth daily, Disp: , Rfl:      Allergies   Allergen Reactions     Hydrochlorothiazide Hives     Codeine      Contrast Dye      Iodinated Contrast Media Itching     Levofloxacin      Sulfa Antibiotics Itching     topical        Past Medical History:   Diagnosis Date     Anxiety      Depressive disorder      Migraines         ROS: 10 point review of symptoms are negative other than the symptoms noted above in the HPI.     Family History has been reviewed with the patient, there are no pertinent findings to presenting concern.     Past Surgical History:   Procedure Laterality Date     APPENDECTOMY       CHOLECYSTECTOMY       ENT SURGERY      t and a      GENITOURINARY SURGERY      bladder neck suspension     GYN SURGERY      hysterectomy     HERNIA REPAIR       ORTHOPEDIC SURGERY                OBJECTIVE:   /80   Pulse 68   SpO2 97%    There is no height or weight on file to calculate BMI.     Imaging:     THORACIC SPINE TWO VIEWS  5/23/2023 12:38 PM      COMPARISON: Thoracic spine CT 4/4/2023.     HISTORY:  T12 fracture follow-up. Burst fracture of T12 vertebra (H).                                                                      IMPRESSION: Anterior wedge compression fracture deformity of T12 again  noted. There has been continued interval anterior wedging of the T12  vertebral body with approximately 80% loss of vertebral body height  anteriorly currently. No new fractures. Alignment remains normal.      Exam:   CN II-XII grossly intact, alert and appropriate with conversation and following commands.   Gait is non-antalgic. Unable to tandem walk 2/2 balance. Able to stand on toes and heels without difficulty.   Bilateral bicep 2/4 and tricep reflexes 1/4. Sensation intact throughout upper extremities.     UE muscle strength  Right  Left    Deltoid  5/5  5/5    Biceps  5/5  5/5    Triceps  5/5  5/5    Hand intrinsics  5/5  5/5    Hand grasp  5/5  5/5           TLSO brace in place  Intact sensation throughout lower extremities.   Bilateral patellar 2/4 and achilles reflex 1/4.    LE muscle strength  Right  Left    Iliopsoas (hip flexion)  5/5  5/5    Quad (knee extension)  5/5  5/5    Hamstring (knee flexion)  5/5  5/5    Gastrocnemius (PF)  5/5  5/5    Tibialis Ant. (DF)  5/5  5/5    EHL  5/5  5/5      Negative for clonus.   Calves are soft and non-tender bilaterally.     ASSESSMENT/PLAN:     Debbie Boucher is a 77 year old female with history of osteoporosis who was admitted on 4/4/2023 with back pain s/p mechanical fall. Radiographic findings include an acute T12 burst fracture with approximately 25% vertebral body height loss and minimal retropulsion. Recommendations included TLSO brace, light activity, conservative management. Patient presents to the clinic today for 7 week follow up.     Since discharge, Debbie has been doing well. Her day to day average pain is 2-3 localized in her back around her fracture. Denies radicular symptoms, bowel/bladder changes, saddle anesthesia, weakness. She takes muscle  relaxant PRN and gabapentin daily. She does note her balance is still not at baseline however has improved since hospital stay. She has been wearing her brace when out of bed and ambulating and off when sleeping. She asked about taking off to eat as it will ride up and cause her to feel like she is choking.     Recommendations/plans:   - XR reviewed with Dr. Bailey and with patient and daughter over phone. Patient is doing well, wearing brace as indicated, no new or worsening symptoms. Recommend continuing current plans with brace and follow up in 6 weeks with XR prior. Should there be concerns prior, recommend contacting our office for an earlier appointment. Patient and daughter in agreement. Dr. Bailey in agreement.   -Gradually increase activities as tolerated (I.e., lift additional 2 lbs per week as tolerated)  -Please limit your lifting to no more that ten pounds and avoid excessive bending, twisting and turning at the lumbar spine. You should also avoid excessive jostling and jarring activities.   -Follow up in 5 weeks with XR; our office will coordinate  -OK for gentle massage. NO deep tissue massages  -OK to remove brace for eating.     Respectfully,     Mayuri Li PA-C  [facility] Neurosurgery  [address] [address]    Tel [phone]            Again, thank you for allowing me to participate in the care of your patient.        Sincerely,        Mayuri Li PA-C

## 2023-05-24 ENCOUNTER — TELEPHONE (OUTPATIENT)
Dept: NEUROSURGERY | Facility: CLINIC | Age: 78
End: 2023-05-24
Payer: MEDICARE

## 2023-05-24 NOTE — TELEPHONE ENCOUNTER
Returned call to Jen, she stated that when Mayuri relayed XR results with patient yesterday she could not hear very well and was unable to provide the update to her daughter.     Patients daughter is requesting Mayuri Li PA-C call her back with XR results/recommendations.     Message routed to provider for review.

## 2023-05-24 NOTE — TELEPHONE ENCOUNTER
Called daughter, LVM to return call to clinic     Mayuri BERNSTEIN [facility] Neurosurgery  [address] [place] [address]    Tel [phone]

## 2023-05-25 DIAGNOSIS — S22.081A BURST FRACTURE OF T12 VERTEBRA (H): Primary | ICD-10-CM

## 2023-05-25 NOTE — PROGRESS NOTES
Spoke with daughter, Jen  Reviewed XR findings and plans for follow up with XR   Jen in agreement    Also recommended endocrinology referral for osteoporosis management (advised they will be out quite a few months) and in the meantime continue with PCP    Mayuri Li PA-C  [facility] Neurosurgery  [address] [address]    Tel [phone]

## 2023-06-20 ENCOUNTER — TELEPHONE (OUTPATIENT)
Dept: NEUROSURGERY | Facility: CLINIC | Age: 78
End: 2023-06-20

## 2023-06-20 NOTE — TELEPHONE ENCOUNTER
Called patient need to r/s today's appt with Mayuri Padilla on 6/22 or 6/23. She needs to check with daughter and will call clinic back.

## 2023-06-29 ENCOUNTER — OFFICE VISIT (OUTPATIENT)
Dept: NEUROSURGERY | Facility: CLINIC | Age: 78
End: 2023-06-29
Attending: NURSE PRACTITIONER
Payer: MEDICARE

## 2023-06-29 ENCOUNTER — ANCILLARY PROCEDURE (OUTPATIENT)
Dept: GENERAL RADIOLOGY | Facility: CLINIC | Age: 78
End: 2023-06-29
Attending: PHYSICIAN ASSISTANT
Payer: MEDICARE

## 2023-06-29 VITALS
DIASTOLIC BLOOD PRESSURE: 76 MMHG | WEIGHT: 93 LBS | BODY MASS INDEX: 17.56 KG/M2 | SYSTOLIC BLOOD PRESSURE: 172 MMHG | OXYGEN SATURATION: 98 % | HEIGHT: 61 IN | HEART RATE: 52 BPM

## 2023-06-29 DIAGNOSIS — S22.081A BURST FRACTURE OF T12 VERTEBRA (H): ICD-10-CM

## 2023-06-29 DIAGNOSIS — S22.081A BURST FRACTURE OF T12 VERTEBRA (H): Primary | ICD-10-CM

## 2023-06-29 PROCEDURE — 99213 OFFICE O/P EST LOW 20 MIN: CPT | Performed by: NURSE PRACTITIONER

## 2023-06-29 PROCEDURE — 72070 X-RAY EXAM THORAC SPINE 2VWS: CPT | Mod: TC | Performed by: RADIOLOGY

## 2023-06-29 PROCEDURE — G0463 HOSPITAL OUTPT CLINIC VISIT: HCPCS

## 2023-06-29 ASSESSMENT — PAIN SCALES - GENERAL: PAINLEVEL: NO PAIN (0)

## 2023-06-29 NOTE — PROGRESS NOTES
[facility] Neurosurgery Clinic Visit    Primary Care Provider: Arlin Young    Reason For Visit:   Follow-up, T12 fracture       HPI: Debbie Boucher is a 78 year old female with history of osteoporosis who presents for 3 month follow-up of T12 burst fracture s/p mechanical fall on 4/4/23. Patient is doing well. Denies back pain but reports some muscle fatigue in low back. Denies any radicular pain, numbness, weakness, or new symptoms. She has been wearing TLSO brace as instructed. Currently taking tylenol as needed. Scheduled to start outpatient PT. Patient is hoping to discontinue brace today.     Current pain: 0/10     Past Medical History:   Diagnosis Date     Anxiety      Depressive disorder      Migraines        Past Medical History reviewed with patient during visit.    Past Surgical History:   Procedure Laterality Date     APPENDECTOMY       CHOLECYSTECTOMY       ENT SURGERY      t and a      GENITOURINARY SURGERY      bladder neck suspension     GYN SURGERY      hysterectomy     HERNIA REPAIR       ORTHOPEDIC SURGERY       Past Surgical History reviewed with patient during visit.    Current Outpatient Medications   Medication     acetaminophen (TYLENOL) 325 MG tablet     buPROPion (WELLBUTRIN XL) 150 MG 24 hr tablet     cyanocobalamin (VITAMIN B-12) 100 MCG tablet     FLUoxetine (PROZAC) 40 MG capsule     folic acid (FOLVITE) 1 MG tablet     LEVOTHYROXINE SODIUM PO     lisinopril (ZESTRIL) 30 MG tablet     multivitamin w/minerals (THERA-VIT-M) tablet     omeprazole (PRILOSEC) 40 MG DR capsule     Rizatriptan Benzoate (MAXALT PO)     TRAZODONE HCL PO     vitamin D3 (CHOLECALCIFEROL) 50 mcg (2000 units) tablet     gabapentin (NEURONTIN) 100 MG capsule     oxyCODONE (ROXICODONE) 5 MG tablet     polyethylene glycol (MIRALAX) 17 GM/Dose powder     SENNA-docusate sodium (SENNA S) 8.6-50 MG tablet     No current facility-administered medications for this visit.       Allergies   Allergen Reactions  "    Hydrochlorothiazide Hives     Codeine      Contrast Dye      Iodinated Contrast Media Itching     Levofloxacin      Sulfa Antibiotics Itching     topical       Social History     Socioeconomic History     Marital status: Single       No family history on file.    ROS: 10 point ROS neg other than the symptoms noted above in the HPI.    Vital Signs: BP (!) 172/76   Pulse 52   Ht 1.549 m (5' 0.98\")   Wt 42.2 kg (93 lb)   SpO2 98%   BMI 17.58 kg/m      Neurological Examination:  Awake  Alert  Oriented x 3  Speech clear    Motor exam:  Iliopsoas  (hip flexion)               Right: 5/5  Left:  5/5  Quadriceps  (knee extension)       Right:  5/5  Left:  5/5  Hamstrings  (knee flexion)            Right:  5/5  Left:  5/5  Gastroc Soleus  (PF)                          Right:  5/5  Left:  5/5  Tibialis Ant  (DF)                          Right:  5/5  Left:  5/5  EHL                          Right:  5/5  Left:  5/5         Sensation normal to BLE    Reflexes are 2+ in the patellar and Achilles. There is no clonus.     Musculoskeletal:  Gait: Able to stand from a seated position. Normal non-antalgic, non-myelopathic gait.   No tenderness of the spine or paraspinous muscles.  Negative straight leg raise bilaterally.      Imaging:   THORACIC SPINE 2 VIEWS 6/29/2023 11:04 AM   IMPRESSION: Again seen is a T12 burst fracture with at least  moderate/moderate to severe anterior vertebral height loss/anterior  wedging and some retropulsion of the posterior aspect of the superior  endplate into the ventral aspect of the epidural space/spinal canal.  This appears grossly unchanged compared to most recent radiographs.  There is diffuse osseous demineralization, limiting evaluation for  fracture. Minimal height loss of a few other thoracic vertebrae in the  mid thoracic region, as before. No definite new gross vertebral body  height loss. Unchanged focal kyphosis centered at T12. Unchanged mild  levoconvex curvature. Moderate disc " space narrowing at T11-T12 with  minimal disc space narrowing elsewhere, as before. Overall, no  significant change compared to most recent radiographs.    Assessment/Plan:   78 year old female with history of osteoporosis who presents for 3 month follow-up of T12 burst fracture s/p mechanical fall on 4/4/23. Patient is doing well. XR shows stable T12 burst fracture. Reviewed imaging with patient and family today.     - Okay to wean out of brace as tolerated  - Continue with PT as scheduled  - Follow up with endocrinology for osteoporosis / bone health management   - Follow up with our clinic as needed     Patient and family voiced understanding and agreement.      Monie Woods, [facility] Neurosurgery  [address] [address]  Tel [phone]  Pager [phone]

## 2023-06-29 NOTE — NURSING NOTE
"Debbie Boucher is a 78 year old female who presents for:  Chief Complaint   Patient presents with     RECHECK     T-12 Burst Fracture         Initial Vitals:  BP (!) 172/76   Pulse 52   Ht 5' 0.98\" (1.549 m)   Wt 93 lb (42.2 kg)   SpO2 98%   BMI 17.58 kg/m   Estimated body mass index is 17.58 kg/m  as calculated from the following:    Height as of this encounter: 5' 0.98\" (1.549 m).    Weight as of this encounter: 93 lb (42.2 kg).. Body surface area is 1.35 meters squared. BP completed using cuff size: pediatric  No Pain (0)        Lisa Grant  "

## 2023-06-29 NOTE — LETTER
6/29/2023         RE: Debbie Boucher  [address] [facility] [address]        Dear Colleague,    Thank you for referring your patient, Debbie Boucher, to the [facility] NEUROSURGERY CLINIC [place]. Please see a copy of my visit note below.    [facility] Neurosurgery Clinic Visit    Primary Care Provider: Arlin Young    Reason For Visit:   Follow-up, T12 fracture       HPI: Debbie Boucher is a 78 year old female with history of osteoporosis who presents for 3 month follow-up of T12 burst fracture s/p mechanical fall on 4/4/23. Patient is doing well. Denies back pain but reports some muscle fatigue in low back. Denies any radicular pain, numbness, weakness, or new symptoms. She has been wearing TLSO brace as instructed. Currently taking tylenol as needed. Scheduled to start outpatient PT. Patient is hoping to discontinue brace today.     Current pain: 0/10     Past Medical History:   Diagnosis Date     Anxiety      Depressive disorder      Migraines        Past Medical History reviewed with patient during visit.    Past Surgical History:   Procedure Laterality Date     APPENDECTOMY       CHOLECYSTECTOMY       ENT SURGERY      t and a      GENITOURINARY SURGERY      bladder neck suspension     GYN SURGERY      hysterectomy     HERNIA REPAIR       ORTHOPEDIC SURGERY       Past Surgical History reviewed with patient during visit.    Current Outpatient Medications   Medication     acetaminophen (TYLENOL) 325 MG tablet     buPROPion (WELLBUTRIN XL) 150 MG 24 hr tablet     cyanocobalamin (VITAMIN B-12) 100 MCG tablet     FLUoxetine (PROZAC) 40 MG capsule     folic acid (FOLVITE) 1 MG tablet     LEVOTHYROXINE SODIUM PO     lisinopril (ZESTRIL) 30 MG tablet     multivitamin w/minerals (THERA-VIT-M) tablet     omeprazole (PRILOSEC) 40 MG DR capsule     Rizatriptan Benzoate (MAXALT PO)     TRAZODONE HCL PO     vitamin D3 (CHOLECALCIFEROL) 50 mcg (2000 units) tablet     gabapentin  "(NEURONTIN) 100 MG capsule     oxyCODONE (ROXICODONE) 5 MG tablet     polyethylene glycol (MIRALAX) 17 GM/Dose powder     SENNA-docusate sodium (SENNA S) 8.6-50 MG tablet     No current facility-administered medications for this visit.       Allergies   Allergen Reactions     Hydrochlorothiazide Hives     Codeine      Contrast Dye      Iodinated Contrast Media Itching     Levofloxacin      Sulfa Antibiotics Itching     topical       Social History     Socioeconomic History     Marital status: Single       No family history on file.    ROS: 10 point ROS neg other than the symptoms noted above in the HPI.    Vital Signs: BP (!) 172/76   Pulse 52   Ht 1.549 m (5' 0.98\")   Wt 42.2 kg (93 lb)   SpO2 98%   BMI 17.58 kg/m      Neurological Examination:  Awake  Alert  Oriented x 3  Speech clear    Motor exam:  Iliopsoas  (hip flexion)               Right: 5/5  Left:  5/5  Quadriceps  (knee extension)       Right:  5/5  Left:  5/5  Hamstrings  (knee flexion)            Right:  5/5  Left:  5/5  Gastroc Soleus  (PF)                          Right:  5/5  Left:  5/5  Tibialis Ant  (DF)                          Right:  5/5  Left:  5/5  EHL                          Right:  5/5  Left:  5/5         Sensation normal to BLE    Reflexes are 2+ in the patellar and Achilles. There is no clonus.     Musculoskeletal:  Gait: Able to stand from a seated position. Normal non-antalgic, non-myelopathic gait.   No tenderness of the spine or paraspinous muscles.  Negative straight leg raise bilaterally.      Imaging:   THORACIC SPINE 2 VIEWS 6/29/2023 11:04 AM   IMPRESSION: Again seen is a T12 burst fracture with at least  moderate/moderate to severe anterior vertebral height loss/anterior  wedging and some retropulsion of the posterior aspect of the superior  endplate into the ventral aspect of the epidural space/spinal canal.  This appears grossly unchanged compared to most recent radiographs.  There is diffuse osseous demineralization, " limiting evaluation for  fracture. Minimal height loss of a few other thoracic vertebrae in the  mid thoracic region, as before. No definite new gross vertebral body  height loss. Unchanged focal kyphosis centered at T12. Unchanged mild  levoconvex curvature. Moderate disc space narrowing at T11-T12 with  minimal disc space narrowing elsewhere, as before. Overall, no  significant change compared to most recent radiographs.    Assessment/Plan:   78 year old female with history of osteoporosis who presents for 3 month follow-up of T12 burst fracture s/p mechanical fall on 4/4/23. Patient is doing well. XR shows stable T12 burst fracture. Reviewed imaging with patient and family today.     - Okay to wean out of brace as tolerated  - Continue with PT as scheduled  - Follow up with endocrinology for osteoporosis / bone health management   - Follow up with our clinic as needed     Patient and family voiced understanding and agreement.      Monie Woods CNP  [facility] Neurosurgery  [address] [address]  Tel [phone]  Pager [phone]          Again, thank you for allowing me to participate in the care of your patient.        Sincerely,        Monie Woods, TREV

## 2023-07-07 ENCOUNTER — THERAPY VISIT (OUTPATIENT)
Dept: PHYSICAL THERAPY | Facility: CLINIC | Age: 78
End: 2023-07-07
Attending: PHYSICIAN ASSISTANT
Payer: MEDICARE

## 2023-07-07 DIAGNOSIS — S22.081A BURST FRACTURE OF T12 VERTEBRA (H): ICD-10-CM

## 2023-07-07 PROCEDURE — 97530 THERAPEUTIC ACTIVITIES: CPT | Mod: GP | Performed by: PHYSICAL THERAPIST

## 2023-07-07 PROCEDURE — 97110 THERAPEUTIC EXERCISES: CPT | Mod: GP | Performed by: PHYSICAL THERAPIST

## 2023-07-07 PROCEDURE — 97161 PT EVAL LOW COMPLEX 20 MIN: CPT | Mod: GP | Performed by: PHYSICAL THERAPIST

## 2023-07-07 NOTE — PROGRESS NOTES
PHYSICAL THERAPY EVALUATION  Type of Visit: Evaluation    See electronic medical record for Abuse and Falls Screening details.    Subjective         Pt c/o LBP and instability S/P T12 fracture.  Pt states she fell cleaning out cat litter box 4/4/2023.  ER imaging showed T12 burst fx.  Pt was placed in [facility]O at that time.  Was in [facility] x 2 weeks and then discharged to home (with family support). She was released from [facility]O 6/29/203 after imaging showed stable T12 fx.  States she only uses TLSO sparingly when fatigued now..  Not using assistive device prior to fall, now using small quad cane.      Presenting condition or subjective complaint: burst fracture T12  Date of onset: 04/04/23    Relevant medical history: Depression; High blood pressure; Hearing problems; Migraines or headaches; Thyroid problems; Osteoporosis   Dates & types of surgery:      Prior diagnostic imaging/testing results: X-ray; CT scan     Prior therapy history for the same diagnosis, illness or injury: Yes home PT    Prior Level of Function   Transfers: Independent  Ambulation: Independent  ADL: Independent  IADL: Driving, Finances, Housekeeping, Laundry, Meal preparation, Medication management    Living Environment  Social support: Alone   Type of home: Apartment/condo   Stairs to enter the home: No       Ramp: No   Stairs inside the home: No       Help at home: None  Equipment owned: Four-point cane; Walker with wheels; Bedrail; Grab bars     Employment: No    Hobbies/Interests: reading, painting and playing cards    Patient goals for therapy: walk without feeling like I'm going to fall    Pain assessment: Pain present     Objective   LUMBAR SPINE EVALUATION  PAIN: Pain Level at Rest: 3/10  Pain Level with Use: 3/10  Pain Location: lumbar spine  Pain Quality: Aching, Shooting and Throbbing  Pain Frequency: constant  Pain is Worst: daytime  Pain is Exacerbated By: walking, bending ,lifting, twisting, transfers, prolonged positions (stiffness)  Pain  is Relieved By: rest and use  Pain Progression: Improved  INTEGUMENTARY (edema, incisions):   POSTURE: WFL  GAIT:   Weightbearing Status: WBAT  Assistive Device(s): Cane (quad)  Gait Deviations: Gudelia decreased  Trunk flexion  BALANCE/PROPRIOCEPTION: Single Leg Stance Eyes Open (seconds): 1-2 sec B. closed dbl stance 15-20sec with moderate sway  Timed up and go 12.3 sec  WEIGHTBEARING ALIGNMENT:   NON-WEIGHTBEARING ALIGNMENT:    ROM:   (Degrees) Left AROM Left PROM  Right AROM Right PROM   Hip Flexion       Hip Extension       Hip Abduction       Hip Adduction       Hip Internal Rotation       Hip External Rotation       Knee Flexion       Knee Extension       Lumbar Side glide     Lumbar Flexion Min loss +/-   Lumbar Extension Mod/max loss +     Lx ROM:  B mod loss +/-    Pain:   End feel:     PELVIC/SI SCREEN:   STRENGTH: Fair core stab recruitment with quick fatigue. B GMed 4/5, glute max 4/5    MYOTOMES: WNL  DTR S:   CORD SIGNS:   DERMATOMES: WNL  NEURAL TENSION:   FLEXIBILITY: WFL  LUMBAR/HIP Special Tests:    PELVIS/SI SPECIAL TESTS:   FUNCTIONAL TESTS:   PALPATION: Tx/Lx paraspinal and T12 vert  SPINAL SEGMENTAL CONCLUSIONS:       Assessment & Plan   CLINICAL IMPRESSIONS   Medical Diagnosis: Burst fracture of T12 vertebra    Treatment Diagnosis: LBP   Impression/Assessment: Patient is a 78 year old female with LBP with gait instability  complaints.  The following significant findings have been identified: Pain, Decreased ROM/flexibility, Decreased strength, Decreased proprioception, Impaired gait, Impaired muscle performance, Decreased activity tolerance and Impaired posture. These impairments interfere with their ability to perform self care tasks, recreational activities, household chores, driving , household mobility and community mobility as compared to previous level of function.     Clinical Decision Making (Complexity):   Clinical Presentation: Stable/Uncomplicated  Clinical Presentation Rationale:  based on medical and personal factors listed in PT evaluation  Clinical Decision Making (Complexity): Low complexity    PLAN OF CARE  Treatment Interventions:  Interventions: Gait Training, Neuromuscular Re-education, Therapeutic Activity, Therapeutic Exercise    Long Term Goals            Frequency of Treatment: 1x/week  Duration of Treatment: 8 weeks    Recommended Referrals to Other Professionals:   Education Assessment:   Learner/Method: Patient;Demonstration;Pictures/Video    Risks and benefits of evaluation/treatment have been explained.   Patient/Family/caregiver agrees with Plan of Care.     Evaluation Time:     PT Eval, Low Complexity Minutes (70090): 20      Signing Clinician: YVROSE [facility]                                                                                   OUTPATIENT PHYSICAL THERAPY      PLAN OF TREATMENT FOR OUTPATIENT REHABILITATION   Patient's Last Name, First Name, Debbie Salmeron YOB: 1945   Provider's Name   [facility]   Medical Record No.  [id number]     Onset Date: 04/04/23  Start of Care Date: 07/07/23     Medical Diagnosis:  Burst fracture of T12 vertebra      PT Treatment Diagnosis:  LBP Plan of Treatment  Frequency/Duration: 1x/week/ 8 weeks    Certification date from 07/07/23 to 09/01/23         See note for plan of treatment details and functional goals     Clemente Good, PT                         I CERTIFY THE NEED FOR THESE SERVICES FURNISHED UNDER        THIS PLAN OF TREATMENT AND WHILE UNDER MY CARE     (Physician attestation of this document indicates review and certification of the therapy plan).                  Referring Provider:   Mayuri Li      Initial Assessment  See Epic Evaluation- Start of Care Date: 07/07/23

## 2023-07-14 ENCOUNTER — THERAPY VISIT (OUTPATIENT)
Dept: PHYSICAL THERAPY | Facility: CLINIC | Age: 78
End: 2023-07-14
Payer: MEDICARE

## 2023-07-14 DIAGNOSIS — S22.081A BURST FRACTURE OF T12 VERTEBRA (H): Primary | ICD-10-CM

## 2023-07-14 PROCEDURE — 97110 THERAPEUTIC EXERCISES: CPT | Mod: GP | Performed by: PHYSICAL THERAPIST

## 2023-07-14 PROCEDURE — 97112 NEUROMUSCULAR REEDUCATION: CPT | Mod: GP | Performed by: PHYSICAL THERAPIST

## 2023-07-21 ENCOUNTER — THERAPY VISIT (OUTPATIENT)
Dept: PHYSICAL THERAPY | Facility: CLINIC | Age: 78
End: 2023-07-21
Payer: MEDICARE

## 2023-07-21 DIAGNOSIS — S22.081A BURST FRACTURE OF T12 VERTEBRA (H): Primary | ICD-10-CM

## 2023-07-21 PROCEDURE — 97112 NEUROMUSCULAR REEDUCATION: CPT | Mod: GP | Performed by: PHYSICAL THERAPIST

## 2023-07-21 PROCEDURE — 97110 THERAPEUTIC EXERCISES: CPT | Mod: GP | Performed by: PHYSICAL THERAPIST

## 2023-08-05 ENCOUNTER — OFFICE VISIT (OUTPATIENT)
Dept: URGENT CARE | Facility: URGENT CARE | Age: 78
End: 2023-08-05
Payer: MEDICARE

## 2023-08-05 VITALS
SYSTOLIC BLOOD PRESSURE: 164 MMHG | TEMPERATURE: 97.4 F | BODY MASS INDEX: 17.58 KG/M2 | DIASTOLIC BLOOD PRESSURE: 76 MMHG | OXYGEN SATURATION: 100 % | WEIGHT: 93 LBS | HEART RATE: 61 BPM

## 2023-08-05 DIAGNOSIS — Z23 NEED FOR DIPHTHERIA-TETANUS-PERTUSSIS (TDAP) VACCINE: ICD-10-CM
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
## 2023-08-05 PROCEDURE — 90715 TDAP VACCINE 7 YRS/> IM: CPT | Performed by: FAMILY MEDICINE

## 2023-08-05 PROCEDURE — 96372 THER/PROPH/DIAG INJ SC/IM: CPT | Performed by: FAMILY MEDICINE

## 2023-08-05 PROCEDURE — 90471 IMMUNIZATION ADMIN: CPT | Performed by: FAMILY MEDICINE

## 2023-08-05 PROCEDURE — 99213 OFFICE O/P EST LOW 20 MIN: CPT | Mod: 25 | Performed by: FAMILY MEDICINE

## 2023-08-05 RX ORDER — CEFTRIAXONE SODIUM 1 G
1 VIAL (EA) INJECTION ONCE
Status: COMPLETED | OUTPATIENT
Start: 2023-08-05 | End: 2023-08-05

## 2023-08-05 RX ADMIN — Medication 1 G: at 12:53

## 2023-08-05 NOTE — PATIENT INSTRUCTIONS
WE are going to give Td and antibiotic    I sent oral antibiotics    If you have worsening pain/fever/illness than to hospital.

## 2023-08-05 NOTE — PROGRESS NOTES
Assessment & Plan     Need for diphtheria-tetanus-pertussis (Tdap) vaccine  Update Td from 2013     Cat bite, initial encounter  Discussed possible need for hospital consultation with hand surgery if this doesn't improve as currently red/swollen and painful.  Okay to monitor but if worse than to ER  Rocephin/augmentin  Follow up with PCP already scheduled for Monday.  Has great family to monitor as well  - amoxicillin-clavulanate (AUGMENTIN) 875-125 MG tablet  Dispense: 20 tablet; Refill: 0  - cefTRIAXone (ROCEPHIN) in lidocaine 1% (PF) for IM administration 1 g             No follow-ups on file.    Scott Giraldo MD  [facility] [place] is a 78 year old female who presents to clinic today for the following health issues:  Chief Complaint   Patient presents with    Urgent Care     Left hand cat bite x 3 days red and swollen     HPI    Cat bite.  3 days   Red and swelling  Her cat. Not outside cat        Review of Systems        Objective    BP (!) 164/76 (BP Location: Right arm, Patient Position: Sitting, Cuff Size: Adult Regular)   Pulse 61   Temp 97.4  F (36.3  C) (Tympanic)   Wt 42.2 kg (93 lb)   SpO2 100%   Breastfeeding No   BMI 17.58 kg/m    Physical Exam  Vitals and nursing note reviewed.   Musculoskeletal:         General: Swelling, tenderness and signs of injury present. No deformity.      Comments: Left middle finger with pain/swelling and some extending reddness.

## 2023-08-07 ENCOUNTER — OFFICE VISIT (OUTPATIENT)
Dept: INTERNAL MEDICINE | Facility: CLINIC | Age: 78
End: 2023-08-07
Payer: MEDICARE

## 2023-08-07 VITALS
HEART RATE: 77 BPM | OXYGEN SATURATION: 99 % | WEIGHT: 90.3 LBS | SYSTOLIC BLOOD PRESSURE: 124 MMHG | HEIGHT: 60 IN | TEMPERATURE: 97.5 F | DIASTOLIC BLOOD PRESSURE: 62 MMHG | RESPIRATION RATE: 16 BRPM | BODY MASS INDEX: 17.73 KG/M2

## 2023-08-07 DIAGNOSIS — M81.0 OSTEOPOROSIS, UNSPECIFIED OSTEOPOROSIS TYPE, UNSPECIFIED PATHOLOGICAL FRACTURE PRESENCE: ICD-10-CM

## 2023-08-07 DIAGNOSIS — E03.9 HYPOTHYROIDISM, UNSPECIFIED TYPE: ICD-10-CM
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
## 2023-08-07 DIAGNOSIS — Z02.9 ADMINISTRATIVE ENCOUNTER: ICD-10-CM

## 2023-08-07 PROCEDURE — 99213 OFFICE O/P EST LOW 20 MIN: CPT

## 2023-08-07 ASSESSMENT — PATIENT HEALTH QUESTIONNAIRE - PHQ9
10. IF YOU CHECKED OFF ANY PROBLEMS, HOW DIFFICULT HAVE THESE PROBLEMS MADE IT FOR YOU TO DO YOUR WORK, TAKE CARE OF THINGS AT HOME, OR GET ALONG WITH OTHER PEOPLE: SOMEWHAT DIFFICULT
SUM OF ALL RESPONSES TO PHQ QUESTIONS 1-9: 4
SUM OF ALL RESPONSES TO PHQ QUESTIONS 1-9: 4

## 2023-08-07 NOTE — PROGRESS NOTES
Assessment & Plan     Cat bite, subsequent encounter  Patient got bit by a cat last Thursday.  She was seen in urgent care on Saturday and had a shot of ceftriaxone and 10-day course of Augmentin prescribed.  Redness and swelling has come down considerably since Saturday.  I asked the patient to reach out after course of antibiotics is complete if she is still having redness swelling or heat.  If signs of infection are still could prescribe additional 4 days of Augmentin    Return to clinic if increase in swelling, redness, or area is spreading    Hypothyroidism, unspecified type  On synthoid    Osteoporosis, unspecified osteoporosis type, unspecified pathological fracture presence  Scheduled with endocrine in November, currently in PT. Recent T12 burst fracture    Administrative encounter  Handicap parking signed until december       Tiago Tellez NP  [facility] ALEX Lewis is a 78 year old, presenting for the following health issues:  Hypertension and RECHECK ([facility] follow up on cat bite.)      8/7/2023     9:51 AM   Additional Questions   Roomed by Regine Vail MA   Accompanied by Her Daughter Jen       History of Present Illness       Reason for visit:  Blood pressure and cat bite    She eats 2-3 servings of fruits and vegetables daily.She consumes 2 sweetened beverage(s) daily.She exercises with enough effort to increase her heart rate 9 or less minutes per day.  She exercises with enough effort to increase her heart rate 3 or less days per week.   She is taking medications regularly.       ED/ Followup:    Facility:  [facility]  Date of visit: 8/5/23  Reason for visit: Cat bite  Current Status: Not improving    Cat bite last thurseday. Went to UC on Saturday.     Hand has been getting less swollen and less red, is still painful. The pain has gotten slightly better. Less swollen, less red at this time.     Daughter lives  2 blocks away and has been checking in twice per day    Review of Systems   Constitutional, HEENT, cardiovascular, pulmonary, gi and gu systems are negative, except as otherwise noted.      Objective    BP (!) 145/77 (BP Location: Right arm, Patient Position: Sitting, Cuff Size: Child)   Pulse 77   Temp 97.5  F (36.4  C) (Oral)   Resp 16   Ht 1.524 m (5')   Wt 41 kg (90 lb 4.8 oz)   SpO2 99%   BMI 17.64 kg/m    Body mass index is 17.64 kg/m .  Physical Exam   GENERAL: alert and no distress  EYES: Eyes grossly normal to inspection  NECK: no adenopathy, no asymmetry, masses, or scars and thyroid normal to palpation  RESP: lungs clear to auscultation - no rales, rhonchi or wheezes  CV: regular rate and rhythm, normal S1 S2, no S3 or S4, no murmur, click or rub, no peripheral edema and peripheral pulses strong  ABDOMEN: soft, nontender, no hepatosplenomegaly, no masses and bowel sounds normal  MS: no gross musculoskeletal defects noted, no edema  SKIN: no suspicious lesions or rashes, redness and swelling on left hand pointer and middle MP joint.  NEURO: Normal strength and tone, mentation intact and speech normal  PSYCH: mentation appears normal, affect normal/bright

## 2023-08-07 NOTE — PATIENT INSTRUCTIONS
Check back in if has not resolved when antibiotics are running out. We can send additional antibiotics if there is still improvement, but not completed

## 2023-08-11 ENCOUNTER — THERAPY VISIT (OUTPATIENT)
Dept: PHYSICAL THERAPY | Facility: CLINIC | Age: 78
End: 2023-08-11
Payer: MEDICARE

## 2023-08-11 DIAGNOSIS — S22.081A BURST FRACTURE OF T12 VERTEBRA (H): Primary | ICD-10-CM

## 2023-08-11 PROCEDURE — 97112 NEUROMUSCULAR REEDUCATION: CPT | Mod: GP | Performed by: PHYSICAL THERAPIST

## 2023-08-11 PROCEDURE — 97110 THERAPEUTIC EXERCISES: CPT | Mod: GP | Performed by: PHYSICAL THERAPIST
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
## 2023-08-14 NOTE — TELEPHONE ENCOUNTER
Patient calls to say Tiago told patient to call in if she needed a refill of this medication for cat bite.

## 2023-08-14 NOTE — TELEPHONE ENCOUNTER
Provider is on vacation and patient called checking the status. Please review and advise, call patient at [phone].  Yris Maria   [facility]  Central Scheduler

## 2023-08-15 NOTE — TELEPHONE ENCOUNTER
Per office visit on 8/7/23:  Cat bite, subsequent encounter  Patient got bit by a cat last Thursday.  She was seen in urgent care on Saturday and had a shot of ceftriaxone and 10-day course of Augmentin prescribed.  Redness and swelling has come down considerably since Saturday.  I asked the patient to reach out after course of antibiotics is complete if she is still having redness swelling or heat.  If signs of infection are still could prescribe additional 4 days of Augmentin

## 2023-08-15 NOTE — TELEPHONE ENCOUNTER
Patient calls back and is out of medication.  Patient took last pill yesterday morning. Please send new RX as soon as possible.

## 2023-08-25 ENCOUNTER — THERAPY VISIT (OUTPATIENT)
Dept: PHYSICAL THERAPY | Facility: CLINIC | Age: 78
End: 2023-08-25
Payer: MEDICARE

## 2023-08-25 DIAGNOSIS — S22.081A BURST FRACTURE OF T12 VERTEBRA (H): Primary | ICD-10-CM

## 2023-08-25 PROCEDURE — 97112 NEUROMUSCULAR REEDUCATION: CPT | Mod: GP | Performed by: PHYSICAL THERAPIST

## 2023-08-25 PROCEDURE — 97110 THERAPEUTIC EXERCISES: CPT | Mod: GP | Performed by: PHYSICAL THERAPIST

## 2023-09-08 ENCOUNTER — THERAPY VISIT (OUTPATIENT)
Dept: PHYSICAL THERAPY | Facility: CLINIC | Age: 78
End: 2023-09-08
Payer: MEDICARE

## 2023-09-08 DIAGNOSIS — S22.081A BURST FRACTURE OF T12 VERTEBRA (H): Primary | ICD-10-CM

## 2023-09-08 PROCEDURE — 97110 THERAPEUTIC EXERCISES: CPT | Mod: GP | Performed by: PHYSICAL THERAPIST

## 2023-09-08 PROCEDURE — 97112 NEUROMUSCULAR REEDUCATION: CPT | Mod: GP | Performed by: PHYSICAL THERAPIST

## 2023-09-22 ENCOUNTER — THERAPY VISIT (OUTPATIENT)
Dept: PHYSICAL THERAPY | Facility: CLINIC | Age: 78
End: 2023-09-22
Payer: MEDICARE

## 2023-09-22 DIAGNOSIS — S22.081A BURST FRACTURE OF T12 VERTEBRA (H): Primary | ICD-10-CM

## 2023-09-22 PROCEDURE — 97110 THERAPEUTIC EXERCISES: CPT | Mod: GP | Performed by: PHYSICAL THERAPIST

## 2023-09-22 PROCEDURE — 97112 NEUROMUSCULAR REEDUCATION: CPT | Mod: GP | Performed by: PHYSICAL THERAPIST

## 2023-09-22 NOTE — PROGRESS NOTES
DISCHARGE  Reason for Discharge: Patient has met all goals.    Equipment Issued: none    Discharge Plan: Patient to continue home program.    Referring Provider:  Mayuri Li     09/22/23 0500   Appointment Info   Signing clinician's name / credentials Clemente Good PT   Total/Authorized Visits 12   Visits Used 7   Medical Diagnosis Burst fracture of T12 vertebra   PT Tx Diagnosis LBP, gait imbalance   Quick Adds Certification   Progress Note/Certification   Start of Care Date 07/07/23   Onset of illness/injury or Date of Surgery 04/04/23   Therapy Frequency 1x/week   Predicted Duration 3 months   Certification date from 07/07/23   Certification date to 10/07/23   Progress Note Completed Date 07/07/23   GOALS   PT Goals 2   PT Goal 1   Goal Identifier Ambulation   Goal Description Be able to ambulate 40 minutes without deviation or AD   Rationale   (for safe household ambulation;for safe community ambulation;to maintain proper body mechanics/posture while ambulating to avoid additional compensatory injury due to improper gait mechanics;to promote a healthy and active lifestyle)   Goal Progress 25-30 no AD normal gait   Target Date 10/07/23   PT Goal 2   Goal Identifier Balance   Goal Description Reduce falls/nears falls to 0 x 3 weeks   Rationale   (for safe household ambulation;for safe work ambulation;for safe community ambulation;for safe showering;for safe dressing;for safe standing;for safe homemaking tasks;)   Goal Progress No near falls over past 3-4 weeks   Target Date 10/07/23   Date Met 09/22/23   Subjective Report   Subjective Report Fatigued past few days.  Has been using stationary bike with good tolerance.  No near falls past few weeks.  Feeling comfortable with HEP for getting stronger, improving balance   Objective Measures   Objective Measures Objective Measure 1;Objective Measure 2   Objective Measure 1   Objective Measure Pain 3/10 at IE   Details pain 0-1/10-more constant ache    Objective Measure 2   Objective Measure Able to heel toe walk, high step no support or LOB.  SLS 8-10 sec.  TUG 10.8 sec.  Lx ROM: flex min loss -, ext min/mod loss +/-.  Improved core stab recruitment and endurance   Treatment Interventions (PT)   Interventions Therapeutic Procedure/Exercise;Therapeutic Activity;Neuromuscular Re-education   Therapeutic Procedure/Exercise   Therapeutic Procedures: strength, endurance, ROM, flexibillity minutes (03403) 20   Therapeutic Procedures Ther Proc 2   Ther Proc 1 nu step lv 6, sh 6   Ther Proc 1 - Details 7'   Ther Proc 2 standing hip ext   Ther Proc 2 - Details 5x B supported   PTRx Ther Proc 1 Roll Ins   PTRx Ther Proc 1 - Details 10x 5 sec hold.  Cuing for posture and contraction for mm work vs pain   PTRx Ther Proc 2 Roll Outs   PTRx Ther Proc 2 - Details 10x GTB min postural cuing   PTRx Ther Proc 3 Standing Hip Flexion   PTRx Ther Proc 3 - Details Slow high knee marching. Focus on posture and balance retraining.  30sec x 3  cuing to perform slower to get proprioceptive training as well   PTRx Ther Proc 4 Hip AROM Standing Abduction   PTRx Ther Proc 4 - Details 8x B supported.  cuing for abdominal set and to maintain upright posture.   Therapeutic Activity   Ther Act 1 Discussed Dx/anatomy, POC, pt goals and self control measures (brief body mechanics).  Discussed safety vs balance training with risk of falls   PTRx Ther Act 1 Knee Bends   PTRx Ther Act 1 - Details rev   Neuromuscular Re-education   Neuromuscular re-ed of mvmt, balance, coord, kinesthetic sense, posture, proprioception minutes (31732) 15   Neuromuscular Re-education Neuro Re-ed 2   Neuro Re-ed 1 marching on airex, high knee forward and side, heel toe and retro walking   Neuro Re-ed 1 - Details improved balance. 4 passes   PTRx Neuro Re-ed 1 Romberg Eyes Open   PTRx Neuro Re-ed 1 - Details closed stance at table. 20sec x 3.  Good control. D/C to SLS   PTRx Neuro Re-ed 2 Balance Single Leg Stance  Supported and Unsupported   PTRx Neuro Re-ed 2 - Details 20sec x 3 B with 2 finger support B hand intermittently.  mod postural cuing   PTRx Neuro Re-ed 3 Shoulder Theraband Rows   PTRx Neuro Re-ed 3 - Details 10x GTB chair behind to balance assist. cuing to not sway forward and back to control balance   Education   Learner/Method Patient;Demonstration;Pictures/Video   Education Comments print.   Plan   Home program See PTRx   Updates to plan of care D/C with established HEP   Plan for next session progress core stab and proprioceptive training   Total Session Time   Timed Code Treatment Minutes 35   Total Treatment Time (sum of timed and untimed services) 35

## 2023-09-25 ENCOUNTER — APPOINTMENT (OUTPATIENT)
Dept: GENERAL RADIOLOGY | Facility: CLINIC | Age: 78
DRG: 481 | End: 2023-09-25
Attending: EMERGENCY MEDICINE
Payer: MEDICARE

## 2023-09-25 ENCOUNTER — APPOINTMENT (OUTPATIENT)
Dept: CT IMAGING | Facility: CLINIC | Age: 78
DRG: 481 | End: 2023-09-25
Attending: EMERGENCY MEDICINE
Payer: MEDICARE

## 2023-09-25 ENCOUNTER — HOSPITAL ENCOUNTER (INPATIENT)
Facility: CLINIC | Age: 78
LOS: 6 days | Discharge: NURSING, REHAB OR LONG-TERM CARE | DRG: 481 | End: 2023-10-01
Attending: EMERGENCY MEDICINE | Admitting: INTERNAL MEDICINE
Payer: MEDICARE

## 2023-09-25 DIAGNOSIS — S72.001A HIP FRACTURE, RIGHT, CLOSED, INITIAL ENCOUNTER (H): ICD-10-CM

## 2023-09-25 DIAGNOSIS — S72.141A CLOSED DISPLACED INTERTROCHANTERIC FRACTURE OF RIGHT FEMUR, INITIAL ENCOUNTER (H): Primary | ICD-10-CM

## 2023-09-25 DIAGNOSIS — S52.501A CLOSED FRACTURE OF DISTAL END OF RIGHT RADIUS, UNSPECIFIED FRACTURE MORPHOLOGY, INITIAL ENCOUNTER: ICD-10-CM
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
## 2023-09-25 DIAGNOSIS — K21.9 GASTROESOPHAGEAL REFLUX DISEASE WITHOUT ESOPHAGITIS: ICD-10-CM

## 2023-09-25 LAB
ABO/RH(D): NORMAL
ANION GAP SERPL CALCULATED.3IONS-SCNC: 15 MMOL/L (ref 7–15)
ANTIBODY SCREEN: NEGATIVE
BASOPHILS # BLD AUTO: 0 10E3/UL (ref 0–0.2)
BASOPHILS NFR BLD AUTO: 0 %
BUN SERPL-MCNC: 11.4 MG/DL (ref 8–23)
CALCIUM SERPL-MCNC: 9.3 MG/DL (ref 8.8–10.2)
CHLORIDE SERPL-SCNC: 99 MMOL/L (ref 98–107)
CREAT SERPL-MCNC: 0.74 MG/DL (ref 0.51–0.95)
DEPRECATED CALCIDIOL+CALCIFEROL SERPL-MC: 38 UG/L (ref 20–75)
DEPRECATED HCO3 PLAS-SCNC: 24 MMOL/L (ref 22–29)
EGFRCR SERPLBLD CKD-EPI 2021: 83 ML/MIN/1.73M2
EOSINOPHIL # BLD AUTO: 0 10E3/UL (ref 0–0.7)
EOSINOPHIL NFR BLD AUTO: 0 %
ERYTHROCYTE [DISTWIDTH] IN BLOOD BY AUTOMATED COUNT: 12 % (ref 10–15)
GLUCOSE SERPL-MCNC: 144 MG/DL (ref 70–99)
HCT VFR BLD AUTO: 35.8 % (ref 35–47)
HGB BLD-MCNC: 12 G/DL (ref 11.7–15.7)
HOLD SPECIMEN: NORMAL
IMM GRANULOCYTES # BLD: 0 10E3/UL
IMM GRANULOCYTES NFR BLD: 1 %
LYMPHOCYTES # BLD AUTO: 1.2 10E3/UL (ref 0.8–5.3)
LYMPHOCYTES NFR BLD AUTO: 19 %
MCH RBC QN AUTO: 34.2 PG (ref 26.5–33)
MCHC RBC AUTO-ENTMCNC: 33.5 G/DL (ref 31.5–36.5)
MCV RBC AUTO: 102 FL (ref 78–100)
MONOCYTES # BLD AUTO: 0.4 10E3/UL (ref 0–1.3)
MONOCYTES NFR BLD AUTO: 6 %
NEUTROPHILS # BLD AUTO: 4.6 10E3/UL (ref 1.6–8.3)
NEUTROPHILS NFR BLD AUTO: 74 %
NRBC # BLD AUTO: 0 10E3/UL
NRBC BLD AUTO-RTO: 0 /100
PLATELET # BLD AUTO: 231 10E3/UL (ref 150–450)
POTASSIUM SERPL-SCNC: 3.7 MMOL/L (ref 3.4–5.3)
RBC # BLD AUTO: 3.51 10E6/UL (ref 3.8–5.2)
SODIUM SERPL-SCNC: 138 MMOL/L (ref 136–145)
SPECIMEN EXPIRATION DATE: NORMAL
WBC # BLD AUTO: 6.2 10E3/UL (ref 4–11)

## 2023-09-25 PROCEDURE — 36415 COLL VENOUS BLD VENIPUNCTURE: CPT | Performed by: EMERGENCY MEDICINE

## 2023-09-25 PROCEDURE — 86850 RBC ANTIBODY SCREEN: CPT | Performed by: PHYSICIAN ASSISTANT

## 2023-09-25 PROCEDURE — 99285 EMERGENCY DEPT VISIT HI MDM: CPT | Mod: 25

## 2023-09-25 PROCEDURE — 73552 X-RAY EXAM OF FEMUR 2/>: CPT | Mod: RT

## 2023-09-25 PROCEDURE — 73110 X-RAY EXAM OF WRIST: CPT | Mod: RT

## 2023-09-25 PROCEDURE — 250N000011 HC RX IP 250 OP 636: Performed by: EMERGENCY MEDICINE

## 2023-09-25 PROCEDURE — 86901 BLOOD TYPING SEROLOGIC RH(D): CPT | Performed by: PHYSICIAN ASSISTANT

## 2023-09-25 PROCEDURE — 250N000011 HC RX IP 250 OP 636: Performed by: INTERNAL MEDICINE

## 2023-09-25 PROCEDURE — 99207 PR APP CREDIT; MD BILLING SHARED VISIT: CPT | Mod: FS | Performed by: PHYSICIAN ASSISTANT

## 2023-09-25 PROCEDURE — 250N000013 HC RX MED GY IP 250 OP 250 PS 637: Performed by: PHYSICIAN ASSISTANT

## 2023-09-25 PROCEDURE — 36415 COLL VENOUS BLD VENIPUNCTURE: CPT | Performed by: PHYSICIAN ASSISTANT

## 2023-09-25 PROCEDURE — 72170 X-RAY EXAM OF PELVIS: CPT

## 2023-09-25 PROCEDURE — 85025 COMPLETE CBC W/AUTO DIFF WBC: CPT | Performed by: EMERGENCY MEDICINE

## 2023-09-25 PROCEDURE — 99223 1ST HOSP IP/OBS HIGH 75: CPT | Mod: AI | Performed by: INTERNAL MEDICINE

## 2023-09-25 PROCEDURE — G1010 CDSM STANSON: HCPCS

## 2023-09-25 PROCEDURE — 96374 THER/PROPH/DIAG INJ IV PUSH: CPT

## 2023-09-25 PROCEDURE — 120N000001 HC R&B MED SURG/OB

## 2023-09-25 PROCEDURE — 82306 VITAMIN D 25 HYDROXY: CPT | Performed by: PHYSICIAN ASSISTANT

## 2023-09-25 PROCEDURE — 86923 COMPATIBILITY TEST ELECTRIC: CPT | Performed by: INTERNAL MEDICINE

## 2023-09-25 PROCEDURE — 250N000011 HC RX IP 250 OP 636

## 2023-09-25 PROCEDURE — 80048 BASIC METABOLIC PNL TOTAL CA: CPT | Performed by: EMERGENCY MEDICINE

## 2023-09-25 PROCEDURE — 250N000013 HC RX MED GY IP 250 OP 250 PS 637: Performed by: INTERNAL MEDICINE

## 2023-09-25 PROCEDURE — 258N000003 HC RX IP 258 OP 636: Performed by: PHYSICIAN ASSISTANT

## 2023-09-25 RX ORDER — PROCHLORPERAZINE 25 MG
12.5 SUPPOSITORY, RECTAL RECTAL EVERY 12 HOURS PRN
Status: DISCONTINUED | OUTPATIENT
Start: 2023-09-25 | End: 2023-09-25

## 2023-09-25 RX ORDER — PROCHLORPERAZINE MALEATE 5 MG
5 TABLET ORAL EVERY 6 HOURS PRN
Status: DISCONTINUED | OUTPATIENT
Start: 2023-09-25 | End: 2023-09-26

## 2023-09-25 RX ORDER — AMOXICILLIN 250 MG
2 CAPSULE ORAL 2 TIMES DAILY PRN
Status: DISCONTINUED | OUTPATIENT
Start: 2023-09-25 | End: 2023-09-26

## 2023-09-25 RX ORDER — ACETAMINOPHEN 325 MG/1
975 TABLET ORAL 3 TIMES DAILY
Status: CANCELLED | OUTPATIENT
Start: 2023-09-25

## 2023-09-25 RX ORDER — VITAMIN B COMPLEX
50 TABLET ORAL DAILY
Status: DISCONTINUED | OUTPATIENT
Start: 2023-09-26 | End: 2023-09-25

## 2023-09-25 RX ORDER — HYDROMORPHONE HCL IN WATER/PF 6 MG/30 ML
0.4 PATIENT CONTROLLED ANALGESIA SYRINGE INTRAVENOUS
Status: DISCONTINUED | OUTPATIENT
Start: 2023-09-25 | End: 2023-09-25

## 2023-09-25 RX ORDER — ACETAMINOPHEN 325 MG/1
325-650 TABLET ORAL EVERY 6 HOURS PRN
Status: ON HOLD | COMMUNITY
End: 2023-09-29

## 2023-09-25 RX ORDER — OXYCODONE HYDROCHLORIDE 5 MG/1
5 TABLET ORAL EVERY 4 HOURS PRN
Status: CANCELLED | OUTPATIENT
Start: 2023-09-25

## 2023-09-25 RX ORDER — AMOXICILLIN 250 MG
1 CAPSULE ORAL 2 TIMES DAILY PRN
Status: DISCONTINUED | OUTPATIENT
Start: 2023-09-25 | End: 2023-09-26

## 2023-09-25 RX ORDER — ONDANSETRON 4 MG/1
4 TABLET, ORALLY DISINTEGRATING ORAL EVERY 6 HOURS PRN
Status: DISCONTINUED | OUTPATIENT
Start: 2023-09-25 | End: 2023-09-26

## 2023-09-25 RX ORDER — SODIUM CHLORIDE 9 MG/ML
INJECTION, SOLUTION INTRAVENOUS CONTINUOUS
Status: DISCONTINUED | OUTPATIENT
Start: 2023-09-25 | End: 2023-09-26

## 2023-09-25 RX ORDER — LEVOTHYROXINE SODIUM 75 UG/1
75 TABLET ORAL DAILY
Status: DISCONTINUED | OUTPATIENT
Start: 2023-09-25 | End: 2023-10-01 | Stop reason: HOSPADM

## 2023-09-25 RX ORDER — HYDROXYZINE HYDROCHLORIDE 25 MG/1
25 TABLET, FILM COATED ORAL EVERY 6 HOURS PRN
Status: DISCONTINUED | OUTPATIENT
Start: 2023-09-25 | End: 2023-10-01 | Stop reason: HOSPADM

## 2023-09-25 RX ORDER — FOLIC ACID 1 MG/1
1 TABLET ORAL DAILY
Status: DISCONTINUED | OUTPATIENT
Start: 2023-09-25 | End: 2023-10-01 | Stop reason: HOSPADM

## 2023-09-25 RX ORDER — LISINOPRIL 20 MG/1
20 TABLET ORAL DAILY
Status: DISCONTINUED | OUTPATIENT
Start: 2023-09-25 | End: 2023-09-27

## 2023-09-25 RX ORDER — PANTOPRAZOLE SODIUM 40 MG/1
40 TABLET, DELAYED RELEASE ORAL DAILY
Status: DISCONTINUED | OUTPATIENT
Start: 2023-09-25 | End: 2023-10-01 | Stop reason: HOSPADM

## 2023-09-25 RX ORDER — HYDROMORPHONE HYDROCHLORIDE 1 MG/ML
0.5 INJECTION, SOLUTION INTRAMUSCULAR; INTRAVENOUS; SUBCUTANEOUS
Status: DISCONTINUED | OUTPATIENT
Start: 2023-09-25 | End: 2023-09-26

## 2023-09-25 RX ORDER — ACETAMINOPHEN 325 MG/1
650 TABLET ORAL EVERY 4 HOURS PRN
Status: CANCELLED | OUTPATIENT
Start: 2023-09-28

## 2023-09-25 RX ORDER — HYDROMORPHONE HCL IN WATER/PF 6 MG/30 ML
0.2 PATIENT CONTROLLED ANALGESIA SYRINGE INTRAVENOUS
Status: CANCELLED | OUTPATIENT
Start: 2023-09-25

## 2023-09-25 RX ORDER — ACETAMINOPHEN 325 MG/1
975 TABLET ORAL EVERY 8 HOURS
Status: CANCELLED | OUTPATIENT
Start: 2023-09-25

## 2023-09-25 RX ORDER — OXYCODONE HYDROCHLORIDE 5 MG/1
5 TABLET ORAL EVERY 4 HOURS PRN
Status: DISCONTINUED | OUTPATIENT
Start: 2023-09-25 | End: 2023-09-26

## 2023-09-25 RX ORDER — HYDROMORPHONE HCL IN WATER/PF 6 MG/30 ML
0.1 PATIENT CONTROLLED ANALGESIA SYRINGE INTRAVENOUS
Status: CANCELLED | OUTPATIENT
Start: 2023-09-25

## 2023-09-25 RX ORDER — PROCHLORPERAZINE 25 MG
12.5 SUPPOSITORY, RECTAL RECTAL EVERY 12 HOURS PRN
Status: DISCONTINUED | OUTPATIENT
Start: 2023-09-25 | End: 2023-09-26

## 2023-09-25 RX ORDER — ONDANSETRON 4 MG/1
4 TABLET, ORALLY DISINTEGRATING ORAL EVERY 6 HOURS PRN
Status: DISCONTINUED | OUTPATIENT
Start: 2023-09-25 | End: 2023-09-25

## 2023-09-25 RX ORDER — UBIDECARENONE 75 MG
100 CAPSULE ORAL DAILY
Status: DISCONTINUED | OUTPATIENT
Start: 2023-09-25 | End: 2023-10-01 | Stop reason: HOSPADM

## 2023-09-25 RX ORDER — HYDROXYZINE HYDROCHLORIDE 50 MG/1
50 TABLET, FILM COATED ORAL EVERY 6 HOURS PRN
Status: DISCONTINUED | OUTPATIENT
Start: 2023-09-25 | End: 2023-10-01 | Stop reason: HOSPADM

## 2023-09-25 RX ORDER — ONDANSETRON 2 MG/ML
4 INJECTION INTRAMUSCULAR; INTRAVENOUS EVERY 6 HOURS PRN
Status: DISCONTINUED | OUTPATIENT
Start: 2023-09-25 | End: 2023-09-25

## 2023-09-25 RX ORDER — HYDROMORPHONE HYDROCHLORIDE 1 MG/ML
0.5 INJECTION, SOLUTION INTRAMUSCULAR; INTRAVENOUS; SUBCUTANEOUS ONCE
Status: DISCONTINUED | OUTPATIENT
Start: 2023-09-25 | End: 2023-09-25

## 2023-09-25 RX ORDER — AMOXICILLIN 250 MG
1 CAPSULE ORAL 2 TIMES DAILY
Status: DISCONTINUED | OUTPATIENT
Start: 2023-09-25 | End: 2023-09-26

## 2023-09-25 RX ORDER — HYDROMORPHONE HYDROCHLORIDE 1 MG/ML
0.5 INJECTION, SOLUTION INTRAMUSCULAR; INTRAVENOUS; SUBCUTANEOUS ONCE
Status: COMPLETED | OUTPATIENT
Start: 2023-09-25 | End: 2023-09-25

## 2023-09-25 RX ORDER — HYDROMORPHONE HYDROCHLORIDE 1 MG/ML
0.3 INJECTION, SOLUTION INTRAMUSCULAR; INTRAVENOUS; SUBCUTANEOUS
Status: DISCONTINUED | OUTPATIENT
Start: 2023-09-25 | End: 2023-09-26

## 2023-09-25 RX ORDER — ONDANSETRON 2 MG/ML
4 INJECTION INTRAMUSCULAR; INTRAVENOUS EVERY 6 HOURS PRN
Status: DISCONTINUED | OUTPATIENT
Start: 2023-09-25 | End: 2023-09-26

## 2023-09-25 RX ORDER — VITAMIN B COMPLEX
50 TABLET ORAL DAILY
Status: DISCONTINUED | OUTPATIENT
Start: 2023-09-25 | End: 2023-10-01 | Stop reason: HOSPADM

## 2023-09-25 RX ORDER — ACETAMINOPHEN 325 MG/1
975 TABLET ORAL EVERY 8 HOURS SCHEDULED
Status: DISCONTINUED | OUTPATIENT
Start: 2023-09-25 | End: 2023-09-26 | Stop reason: ALTCHOICE

## 2023-09-25 RX ORDER — PROCHLORPERAZINE MALEATE 5 MG
5 TABLET ORAL EVERY 6 HOURS PRN
Status: DISCONTINUED | OUTPATIENT
Start: 2023-09-25 | End: 2023-09-25

## 2023-09-25 RX ORDER — AMOXICILLIN 250 MG
2 CAPSULE ORAL 2 TIMES DAILY
Status: DISCONTINUED | OUTPATIENT
Start: 2023-09-25 | End: 2023-09-26

## 2023-09-25 RX ORDER — METHOCARBAMOL 500 MG/1
500 TABLET, FILM COATED ORAL 3 TIMES DAILY PRN
Status: DISCONTINUED | OUTPATIENT
Start: 2023-09-25 | End: 2023-10-01 | Stop reason: HOSPADM

## 2023-09-25 RX ORDER — HYDROMORPHONE HCL IN WATER/PF 6 MG/30 ML
0.2 PATIENT CONTROLLED ANALGESIA SYRINGE INTRAVENOUS
Status: DISCONTINUED | OUTPATIENT
Start: 2023-09-25 | End: 2023-09-25

## 2023-09-25 RX ORDER — BUPROPION HYDROCHLORIDE 150 MG/1
150 TABLET ORAL EVERY MORNING
Status: DISCONTINUED | OUTPATIENT
Start: 2023-09-26 | End: 2023-10-01 | Stop reason: HOSPADM

## 2023-09-25 RX ORDER — HYDROMORPHONE HYDROCHLORIDE 1 MG/ML
INJECTION, SOLUTION INTRAMUSCULAR; INTRAVENOUS; SUBCUTANEOUS
Status: COMPLETED
Start: 2023-09-25 | End: 2023-09-25

## 2023-09-25 RX ORDER — TRAZODONE HYDROCHLORIDE 100 MG/1
100 TABLET ORAL AT BEDTIME
Status: DISCONTINUED | OUTPATIENT
Start: 2023-09-25 | End: 2023-10-01 | Stop reason: HOSPADM

## 2023-09-25 RX ADMIN — SODIUM CHLORIDE: 9 INJECTION, SOLUTION INTRAVENOUS at 17:17

## 2023-09-25 RX ADMIN — METHOCARBAMOL 500 MG: 500 TABLET ORAL at 14:01

## 2023-09-25 RX ADMIN — HYDROXYZINE HYDROCHLORIDE 50 MG: 25 TABLET ORAL at 14:00

## 2023-09-25 RX ADMIN — Medication 50 MCG: at 17:11

## 2023-09-25 RX ADMIN — LEVOTHYROXINE SODIUM 75 MCG: 0.07 TABLET ORAL at 17:12

## 2023-09-25 RX ADMIN — HYDROMORPHONE HYDROCHLORIDE 1 MG: 1 INJECTION, SOLUTION INTRAMUSCULAR; INTRAVENOUS; SUBCUTANEOUS at 10:26

## 2023-09-25 RX ADMIN — PANTOPRAZOLE SODIUM 40 MG: 40 TABLET, DELAYED RELEASE ORAL at 17:12

## 2023-09-25 RX ADMIN — HYDROMORPHONE HYDROCHLORIDE 0.3 MG: 1 INJECTION, SOLUTION INTRAMUSCULAR; INTRAVENOUS; SUBCUTANEOUS at 23:56

## 2023-09-25 RX ADMIN — HYDROMORPHONE HYDROCHLORIDE 0.5 MG: 1 INJECTION, SOLUTION INTRAMUSCULAR; INTRAVENOUS; SUBCUTANEOUS at 20:10

## 2023-09-25 RX ADMIN — FOLIC ACID 1 MG: 1 TABLET ORAL at 17:12

## 2023-09-25 RX ADMIN — HYDROMORPHONE HYDROCHLORIDE 0.5 MG: 1 INJECTION, SOLUTION INTRAMUSCULAR; INTRAVENOUS; SUBCUTANEOUS at 14:48

## 2023-09-25 RX ADMIN — TRAZODONE HYDROCHLORIDE 100 MG: 100 TABLET ORAL at 22:28

## 2023-09-25 RX ADMIN — ACETAMINOPHEN 975 MG: 325 TABLET, FILM COATED ORAL at 17:12

## 2023-09-25 RX ADMIN — ACETAMINOPHEN 975 MG: 325 TABLET, FILM COATED ORAL at 22:28

## 2023-09-25 RX ADMIN — Medication 100 MCG: at 17:16

## 2023-09-25 RX ADMIN — FLUOXETINE 40 MG: 20 CAPSULE ORAL at 17:11

## 2023-09-25 ASSESSMENT — ACTIVITIES OF DAILY LIVING (ADL)
ADLS_ACUITY_SCORE: 35
ADLS_ACUITY_SCORE: 22
ADLS_ACUITY_SCORE: 35
ADLS_ACUITY_SCORE: 35
ADLS_ACUITY_SCORE: 22
ADLS_ACUITY_SCORE: 35
ADLS_ACUITY_SCORE: 35

## 2023-09-25 NOTE — ED TRIAGE NOTES
Patient BIBA for an unwitnessed fall. Patient was taking out the trash and lost balance and falling on right side. Complains of right hip and wrist pain. Right leg shortening and external rotation. EMS Attempted to place C collar patient did not tolerate no complaints of neck or back pain.50 mcg of fentanyl intranasal given en route patient complains of severe pain upon arrival. Patent yelling trembling, tearful. Hx of L 12 fracture.

## 2023-09-25 NOTE — ED PROVIDER NOTES
History     Chief Complaint:  Fall (Fall from standing, complains of right hip and wrist pain.)       HPI   Debbie Boucher is a 78 year old female who presents for evaluation of injuries sustained in a fall.  She reports that she was taking out the trash today when she lost her balance.  She fell onto her right side.  She is having significant pain in the right hip and right wrist.  She not sure if she hit her head but does not believe so.  She has no back pain.  No chest or abdominal pain.      Independent Historian:   None - Patient Only    Review of External Notes:          Medications:    acetaminophen (TYLENOL) 325 MG tablet  buPROPion (WELLBUTRIN XL) 150 MG 24 hr tablet  cyanocobalamin (VITAMIN B-12) 100 MCG tablet  FLUoxetine (PROZAC) 40 MG capsule  folic acid (FOLVITE) 1 MG tablet  levothyroxine (SYNTHROID/LEVOTHROID) 75 MCG tablet  lisinopril (ZESTRIL) 20 MG tablet  multivitamin w/minerals (THERA-VIT-M) tablet  omeprazole (PRILOSEC) 40 MG DR capsule  Rizatriptan Benzoate (MAXALT PO)  traZODone (DESYREL) 100 MG tablet  vitamin D3 (CHOLECALCIFEROL) 50 mcg (2000 units) tablet        Past Medical History:    Past Medical History:   Diagnosis Date    Anxiety     Depressive disorder     Migraines        Past Surgical History:    Past Surgical History:   Procedure Laterality Date    APPENDECTOMY      CHOLECYSTECTOMY      ENT SURGERY      t and a     GENITOURINARY SURGERY      bladder neck suspension    GYN SURGERY      hysterectomy    HERNIA REPAIR      ORTHOPEDIC SURGERY          Physical Exam   Patient Vitals for the past 24 hrs:   BP Temp Temp src Pulse Resp SpO2 Weight   09/25/23 1315 -- -- -- -- -- 97 % --   09/25/23 1230 (!) 153/60 -- -- 65 -- 97 % --   09/25/23 1145 136/59 -- -- 59 -- 98 % --   09/25/23 1143 -- -- -- -- -- 94 % --   09/25/23 1142 -- -- -- -- -- 93 % --   09/25/23 1141 -- -- -- -- -- 92 % --   09/25/23 1140 -- -- -- -- -- 93 % --   09/25/23 1139 -- -- -- -- -- 94 % --   09/25/23 1138 --  -- -- -- -- 95 % --   09/25/23 1137 -- -- -- -- -- 94 % --   09/25/23 1130 128/88 -- -- 64 -- 98 % --   09/25/23 1014 -- -- -- -- 20 -- --   09/25/23 1013 (!) 194/104 97.4  F (36.3  C) Oral 90 -- 99 % 43.9 kg (96 lb 12.5 oz)        Physical Exam  Constitutional: Well appearing.  HEENT: Atraumatic.  PERRL.  EOMI.  Moist mucous membranes.  Neck: Soft.  Supple.  No tenderness to palpation.  Cardiac: Regular rate and rhythm.  No murmur or rub.  Respiratory: Clear to auscultation bilaterally.  No respiratory distress.  No wheezing, rhonchi, or rales.  Abdomen: Soft and nontender.  No guarding.  Nondistended.  Musculoskeletal: Swelling and deformity to the distal right wrist but no open wounds.  Tenderness to palpation throughout the entire wrist.  Sensation light touch intact throughout all fingers.  Able to make handgrip.  Tenderness to palpation of the right hip and significant pain with any attempted range of motion of the right hip.  Distal pulses intact and symmetric.  Cap refill less than 3 seconds throughout all extremities.  No tenderness of the midline spine.  Tenderness over the coccyx.  Neurologic: Alert and oriented.  Normal tone and bulk.  No facial drooping.  Normal speech.  5/5 strength in bilateral upper and lower extremities, with right wrist and right hip limited secondary to injury.  Sensation to light touch intact throughout.    Skin: No rashes.  No edema.  Psych: Normal affect.  Normal behavior.      Emergency Department Course       Imaging:  XR Wrist Right G/E 3 Views   Final Result   IMPRESSION:  Acute distal radial metaphysis fracture with dorsal   impaction and angulation. There is likely intra-articular extension.   There appears to be a tiny ulnar styloid process fracture. Surrounding   soft tissue swelling. There is normal joint alignment. Osteopenia.      HALLEY ESPINOZA MD            SYSTEM ID:  [id number]      XR Femur Right 2 Views   Final Result   IMPRESSION: Acute comminuted  intertrochanteric fracture of the right   femur with superolateral displacement and coxa vara angulation. There   is a displaced lesser trochanteric fracture fragment. Normal joint   alignment. Mild degenerative changes throughout the pelvis.   Osteopenia.      HALLEY ESPINOZA MD            SYSTEM ID:  [id number]      XR Pelvis 1/2 Views   Final Result   IMPRESSION: Acute comminuted intertrochanteric fracture of the right   femur with superolateral displacement and coxa vara angulation. There   is a displaced lesser trochanteric fracture fragment. Normal joint   alignment. Mild degenerative changes throughout the pelvis.   Osteopenia.      HALLEY ESPINOZA MD            SYSTEM ID:  [id number]      CT Head w/o Contrast   Final Result   IMPRESSION: No acute intracranial abnormality.      DONNA VERA MD            SYSTEM ID:  [id number]         Report per radiology    Laboratory:  Labs Ordered and Resulted from Time of ED Arrival to Time of ED Departure   BASIC METABOLIC PANEL - Abnormal       Result Value    Sodium 138      Potassium 3.7      Chloride 99      Carbon Dioxide (CO2) 24      Anion Gap 15      Urea Nitrogen 11.4      Creatinine 0.74      GFR Estimate 83      Calcium 9.3      Glucose 144 (*)    CBC WITH PLATELETS AND DIFFERENTIAL - Abnormal    WBC Count 6.2      RBC Count 3.51 (*)     Hemoglobin 12.0      Hematocrit 35.8       (*)     MCH 34.2 (*)     MCHC 33.5      RDW 12.0      Platelet Count 231      % Neutrophils 74      % Lymphocytes 19      % Monocytes 6      % Eosinophils 0      % Basophils 0      % Immature Granulocytes 1      NRBCs per 100 WBC 0      Absolute Neutrophils 4.6      Absolute Lymphocytes 1.2      Absolute Monocytes 0.4      Absolute Eosinophils 0.0      Absolute Basophils 0.0      Absolute Immature Granulocytes 0.0      Absolute NRBCs 0.0     VITAMIN D DEFICIENCY SCREENING   TYPE AND SCREEN, ADULT    ABO/RH(D) A POS      Antibody Screen Negative      SPECIMEN EXPIRATION  DATE [id number]     ABO/RH TYPE AND SCREEN        Procedures       Emergency Department Course & Assessments:             Interventions:  Medications   Vitamin D3 (CHOLECALCIFEROL) tablet 50 mcg (has no administration in time range)   methocarbamol (ROBAXIN) tablet 500 mg (500 mg Oral $Given 9/25/23 1401)   hydrOXYzine (ATARAX) tablet 25 mg ( Oral See Alternative 9/25/23 1400)     Or   hydrOXYzine (ATARAX) tablet 50 mg (50 mg Oral $Given 9/25/23 1400)   HYDROmorphone (DILAUDID) injection 1 mg (1 mg Intravenous $Given 9/25/23 1026)        Independent Interpretation (X-rays, CTs, rhythm strip):  X-ray right wrist with distal radius fracture  X-ray right hip with intertrochanteric fracture    Consultations/Discussion of Management or Tests:  Orthopedics  Hospitalist       Social Determinants of Health affecting care:   None    Disposition:  The patient was admitted to the hospital under the care of the hospitalist service.    Impression & Plan    Medical Decision Making:  Debbie Boucher is a 78-year-old woman who is afebrile and hemodynamically stable.  CT scan of the head reveals no acute intracranial abnormality.  X-ray of the right wrist and right hip demonstrated right distal radius fracture and right intertrochanteric hip fracture.  She is neurovascular intact in the extremities.  She has no pain in the spine but some pain in the coccyx.  No obvious pelvic fracture on x-ray.  She is given pain medication as above with great control.  Discussed with orthopedics who recommends placing the right distal radius fracture in a splint and will take her to the OR tomorrow for surgical management of her right hip fracture.  Discussed this with patient and family at bedside and they are in agreement the questions were answered.  Discussed with the hospitalist service who accepts for admission and she is in stable condition waiting transport to the floor.      Diagnosis:    ICD-10-CM    1. Closed displaced  intertrochanteric fracture of right femur, initial encounter (H)  S72.141A Case Request: Right intertrochanteric femur fracture open reduction internal fixation using short intramedullary nail     Case Request: Right intertrochanteric femur fracture open reduction internal fixation using short intramedullary nail      2. Hip fracture, right, closed, initial encounter (H)  S72.001A       3. Closed fracture of distal end of right radius, unspecified fracture morphology, initial encounter  S52.501A       4. Fall, initial encounter  W19.XXXA            9/25/2023   Joaquin Calvo MD Salay, Nicholas J, MD  09/25/23 9518

## 2023-09-25 NOTE — PHARMACY-ADMISSION MEDICATION HISTORY
Pharmacist Admission Medication History    Admission medication history is complete. The information provided in this note is only as accurate as the sources available at the time of the update.    Medication reconciliation/reorder completed by provider prior to medication history? No    Information Source(s): Patient, Family member, and CareEverywhere/SureScripts via in-person    Pertinent Information: None    Changes made to PTA medication list:  Added: None  Deleted:   Old augmentin Rx (for cat bite that occurred last month)  Changed:   Lisinopril 30mg -> 20mg per surescripts    Allergies reviewed with patient and updates made in EHR: no    Medication History Completed By: Carissa Mariscal RPH 9/25/2023 12:20 PM    Prior to Admission medications    Medication Sig Last Dose Taking? Auth Provider Long Term End Date   acetaminophen (TYLENOL) 325 MG tablet Take 325-650 mg by mouth every 6 hours as needed for mild pain Unknown at PRN Yes Unknown, Entered By History     buPROPion (WELLBUTRIN XL) 150 MG 24 hr tablet Take 150 mg by mouth every morning 9/24/2023 Yes Unknown, Entered By History Yes    cyanocobalamin (VITAMIN B-12) 100 MCG tablet Take 1 tablet (100 mcg) by mouth daily 9/24/2023 Yes Vanna Overton APRN CNP     FLUoxetine (PROZAC) 40 MG capsule Take 40 mg by mouth daily 9/24/2023 Yes Unknown, Entered By History Yes    folic acid (FOLVITE) 1 MG tablet Take 1 tablet (1 mg) by mouth daily 9/24/2023 Yes Vanna Overton APRN CNP     levothyroxine (SYNTHROID/LEVOTHROID) 75 MCG tablet Take 75 mcg by mouth daily 9/24/2023 Yes Reported, Patient Yes    lisinopril (ZESTRIL) 20 MG tablet Take 20 mg by mouth daily 9/24/2023 Yes Reported, Patient No    multivitamin w/minerals (THERA-VIT-M) tablet Take 1 tablet by mouth daily 9/24/2023 Yes Tony Chery MD     omeprazole (PRILOSEC) 40 MG DR capsule Take 40 mg by mouth daily 9/24/2023 Yes Unknown, Entered By History     Rizatriptan Benzoate (MAXALT PO)  Take 10 mg by mouth once as needed for migraine Unknown at PPRN Yes Reported, Patient     traZODone (DESYREL) 100 MG tablet Take 100 mg by mouth At Bedtime 9/24/2023 Yes Reported, Patient Yes    vitamin D3 (CHOLECALCIFEROL) 50 mcg (2000 units) tablet Take 1 tablet (50 mcg) by mouth daily 9/24/2023 Yes Vanna Overton APRN CNP

## 2023-09-25 NOTE — CONSULTS
[facility]    Orthopedic Consultation    Debbie Boucher MRN# [id number]   Age: 78 year old YOB: 1945     Date of Admission: 9/25/2023    Reason for consult: Right intertrochanteric femur fracture, right distal radius and ulnar styloid fractures       Requesting physician: Joaquin Calvo MD (ED call)       Level of consult: Consult, follow and place orders           Assessment and Plan:   Assessment:   Acute, closed, displaced right intertrochanteric femur fracture  Acute, closed, impacted, comminuted, intraarticular right distal radius fracture and minimally displaced ulnar styloid fracture  Acute right medial elbow pain      Plan:   The patient's history and clinical/diagnostic findings were reviewed with the on-call orthopedic trauma surgeons, Dr. Chucky Lane (general) and Dr. Amber Kaur (hand/wrist). The patient sustained a mechanical fall this morning while taking out her trash resulting in right intertrochanteric femur and right distal radius/ulnar styloid fractures. These fractures are surgical given the alignment and patient's functional baseline. Surgical intervention is recommended for the goals of fracture stabilization, pain control, and to maximize mobility/functionality postoperatively. We discussed that the right hip fracture is more urgent to surgically treat, whereas the right wrist fracture can be done in the coming days up to around a week. Brief discussion held over potential risks and benefits of nonoperative and operative management. The patient/the patient's family wishes to proceed with surgery as recommended above. The patient will be scheduled for a right intertrochanteric femur fracture ORIF using short IM nail for tomorrow morning (9/26/23) pending medical optimization by the hospital team. The patient and her daughter prefer to proceed with a right distal radius fracture ORIF and closed treatment of an ulnar styloid fracture while inpatient if  possible. Per Dr. aKur, we will attempt to find OR time for the right wrist possibly on 9/27/23. The orthopedic surgeons will further discuss the risks, benefits, and outcomes of surgery while obtaining consent.     Right intertrochanteric femur fracture:  -NPO at midnight. Okay for regular diet today.  -NWB/bedrest until postop.  -Continue pain regimen.  -Muscle relaxant available for PRN use.  -Hold any PTA anticoagulation (none per patient and chart review).   -Vitamin D deficiency lab and supplementation ordered.  -Type and screen ordered.  -NV checks per unit routine.    Right distal radius and ulnar styloid fractures:  -Will hold off on right elbow x-rays for now. Will reconsider if pain increases per patient's preference.  -ED staff to place short arm vs sugar tong splint. Sling can be used for comfort.  -Encourage digit ROM. Strongly advise elevation (2-3 pillows; elbow at heart level and digits above elbow level) and use of cold compresses.  -NV checks per unit routine.  -Possible surgery on 9/27/23 pending surgeon/OR availability and medical optimization. Will provide updates over the next day.    Please contact orthopedic trauma team if any questions or concerns arise.           Chief Complaint:   Right hip and right wrist fractures         History of Present Illness:   Medical history obtained via chart review and discussion with the patient and her daughter, Jen, at bedside. Debbie Boucher is a 78 year old right-hand dominant female with past medical history of recent T12 burst fracture, anxiety, depression, and migraines who presented to the [facility] ED on 9/25/23 after a fall sustained at her residence this morning resulting in fractures to the right hip and right wrist. On 9/25/23, the patient was taking out the trash when she lost her balance and reportedly fell onto her right side. She denies head trauma or LOC. The patient was able to somewhat crawl afterwards, but was unable to stand. She was  able to contact her daughter, Jen, by phone who presented to her side and EMS was then contacted. Patient was taken to [facility] ED for further evaluation. Radiographs demonstrated a displaced right intertrochanteric femur fracture and a dorsally angulated, impacted, intraarticular right distal radius fracture with a minimally displaced ulnar styloid fracture. ED tech currently taking measurements to apply splint to the right upper extremity. Ortho was contacted for surgical management. Currently, the patient reports that IV Dilaudid has been very helpful in managing her pain. She did not have benefit from intranasal fentanyl upon presentation. She reports experiencing pain diffusely to the right hip and posterior thigh to the knee, circumferential right wrist, and some soreness to the right medial elbow. Patient noted initial transient tingling to the right foot, but this has since resolved. No numbness or tingling to the right upper extremity. Patient notes occasional muscle tightness/spasms to the right thigh. Patient denies any prior injuries or surgeries to the right hip or knee or right wrist. She is s/p left ankle/lower leg ORIF and skin excision of the right medial upper arm. [facility] Her daughter and her family live two blocks away from the patient. The patient uses a walker as needed for longer distances for balance. Denies known cardiopulmonary diseases. Patient denies complications with anesthesia in the past, but her daughter notes concerns for delirium and temporary memory loss following previous surgeries. No recent illnesses or current systemic symptoms. No PTA anticoagulation or ASA use. NPO since before midnight.          Past Medical History:     Past Medical History:   Diagnosis Date    Anxiety     Depressive disorder     Migraines              Past Surgical History:     Past Surgical History:   Procedure Laterality Date    APPENDECTOMY      CHOLECYSTECTOMY      ENT  SURGERY      t and a     GENITOURINARY SURGERY      bladder neck suspension    GYN SURGERY      hysterectomy    HERNIA REPAIR      ORTHOPEDIC SURGERY               Social History:     Social History     Tobacco Use    Smoking status: Never    Smokeless tobacco: Never   Substance Use Topics    Alcohol use: Not on file             Family History:   No family history on file.          Immunizations:     VACCINE/DOSE   Diptheria   DPT   DTAP   HBIG   Hepatitis A   Hepatitis B   HIB   Influenza   Measles   Meningococcal   MMR   Mumps   Pneumococcal   Polio   Rubella   Small Pox   TDAP   Varicella   Zoster             Allergies:     Allergies   Allergen Reactions    Hydrochlorothiazide Hives    Contrast Dye     Iodinated Contrast Media Itching    Levofloxacin     Sulfa Antibiotics Itching     topical             Medications:     Current Facility-Administered Medications   Medication    [START ON 9/26/2023] Vitamin D3 (CHOLECALCIFEROL) tablet 50 mcg     Current Outpatient Medications   Medication Sig    acetaminophen (TYLENOL) 325 MG tablet Take 325-650 mg by mouth every 6 hours as needed for mild pain    buPROPion (WELLBUTRIN XL) 150 MG 24 hr tablet Take 150 mg by mouth every morning    cyanocobalamin (VITAMIN B-12) 100 MCG tablet Take 1 tablet (100 mcg) by mouth daily    FLUoxetine (PROZAC) 40 MG capsule Take 40 mg by mouth daily    folic acid (FOLVITE) 1 MG tablet Take 1 tablet (1 mg) by mouth daily    levothyroxine (SYNTHROID/LEVOTHROID) 75 MCG tablet Take 75 mcg by mouth daily    lisinopril (ZESTRIL) 20 MG tablet Take 20 mg by mouth daily    multivitamin w/minerals (THERA-VIT-M) tablet Take 1 tablet by mouth daily    omeprazole (PRILOSEC) 40 MG DR capsule Take 40 mg by mouth daily    Rizatriptan Benzoate (MAXALT PO) Take 10 mg by mouth once as needed for migraine    traZODone (DESYREL) 100 MG tablet Take 100 mg by mouth At Bedtime    vitamin D3 (CHOLECALCIFEROL) 50 mcg (2000 units) tablet Take 1 tablet (50 mcg) by  mouth daily             Review of Systems:   CV: NEGATIVE for chest pain, palpitations or peripheral edema  C: NEGATIVE for fever, chills, change in weight  E/M: NEGATIVE for ear, mouth and throat problems  R: NEGATIVE for significant cough or SOB          Physical Exam:   All vitals have been reviewed  Patient Vitals for the past 24 hrs:   BP Temp Temp src Pulse Resp SpO2 Weight   09/25/23 1230 (!) 153/60 -- -- 65 -- 97 % --   09/25/23 1145 136/59 -- -- 59 -- 98 % --   09/25/23 1143 -- -- -- -- -- 94 % --   09/25/23 1142 -- -- -- -- -- 93 % --   09/25/23 1141 -- -- -- -- -- 92 % --   09/25/23 1140 -- -- -- -- -- 93 % --   09/25/23 1139 -- -- -- -- -- 94 % --   09/25/23 1138 -- -- -- -- -- 95 % --   09/25/23 1137 -- -- -- -- -- 94 % --   09/25/23 1130 128/88 -- -- 64 -- 98 % --   09/25/23 1014 -- -- -- -- 20 -- --   09/25/23 1013 (!) 194/104 97.4  F (36.3  C) Oral 90 -- 99 % 43.9 kg (96 lb 12.5 oz)     No intake or output data in the 24 hours ending 09/25/23 1249    Constitutional: Pleasant, alert, appropriate, following commands. NAD.  HEENT: Head atraumatic normocephalic. Pupils equal round and reactive. Slightly hard of hearing.  Respiratory: Unlabored breathing no audible wheeze  Cardiovascular: Regular rate and rhythm per pulses.  GI: Abdomen is non-distended.  Lymph/Hematologic: No lymphadenopathy in areas examined.  Genitourinary: No parkinson.  Skin: No rashes, no cyanosis, no edema.  Musculoskeletal: Right upper extremity: Deformity to the dorsal and volar radial wrist with associated swelling and ecchymosis. No erythema. No open wounds. Well-healed surgical scar to the medial elbow. No swelling over the elbow. Diffusely tender to the right distal radius region and distal ulna. Minimally tender over the medial epicondyle of the elbow. Nontender over the remainder of the elbow, forearm, hand, and digits. Able to actively flex and extend the elbow without increased pain. Patient able to nearly make a composite  fist, but limited by pain. Active thumb IP and index finger DIP and PIP joint flexion and extension intact. Radial pulse 2+. Capillary refill <2 seconds. SILT M/R/U nerve distributions.  Right lower extremity: Right leg is shortened and externally rotated. Deformity to the right thigh. Skin intact to the examined areas including that of the right lateral hip and thigh. No erythema or ecchymosis. Mild to moderate diffuse edema of the right hip and thigh, mostly over the anterior and lateral aspects. Thigh and lower leg compartments are soft and compressible. Diffusely tender to the right lateral hip and thigh with light palpation as well as minimally tender to the popliteal fossa. Nontender over the distal thigh, medial and lateral joint lines of the knee, patella, quadriceps and patellar tendon, calf, and ankle/foot. No attempts made to range the hip or knee due to known fracture. Patient is able to actively DF and PF the ankle and toes. DP pulse 2+. Capillary refill <2 seconds. SILT.  Neurologic: GCS 15, A&OX4          Data:   All laboratory data reviewed  Results for orders placed or performed during the hospital encounter of 09/25/23   CT Head w/o Contrast     Status: None    Narrative    CT SCAN OF THE HEAD WITHOUT CONTRAST   9/25/2023 10:45 AM     HISTORY: Fall, head injury.    TECHNIQUE:  Axial images of the head and coronal reformations without  IV contrast material. Radiation dose for this scan was reduced using  automated exposure control, adjustment of the mA and/or kV according  to patient size, or iterative reconstruction technique.    COMPARISON: Head CT 2/1/2017.    FINDINGS:  No evidence of hemorrhage, mass, or hydrocephalus.  Background of loss of white matter hypoattenuation likely representing  chronic small vessel ischemic change. No acute osseous abnormality.      Impression    IMPRESSION: No acute intracranial abnormality.    DONNA VERA MD         SYSTEM ID:  [id number]   XR Pelvis 1/2 Views      Status: None    Narrative    XR PELVIS 1/2 VIEWS, XR FEMUR RIGHT 2 VIEWS  9/25/2023 11:13 AM     HISTORY: fall, right hip pain  COMPARISON: CT from 4/4/2023      Impression    IMPRESSION: Acute comminuted intertrochanteric fracture of the right  femur with superolateral displacement and coxa vara angulation. There  is a displaced lesser trochanteric fracture fragment. Normal joint  alignment. Mild degenerative changes throughout the pelvis.  Osteopenia.    HALLEY ESPINOZA MD         SYSTEM ID:  [id number]   XR Femur Right 2 Views     Status: None    Narrative    XR PELVIS 1/2 VIEWS, XR FEMUR RIGHT 2 VIEWS  9/25/2023 11:13 AM     HISTORY: fall, right hip pain  COMPARISON: CT from 4/4/2023      Impression    IMPRESSION: Acute comminuted intertrochanteric fracture of the right  femur with superolateral displacement and coxa vara angulation. There  is a displaced lesser trochanteric fracture fragment. Normal joint  alignment. Mild degenerative changes throughout the pelvis.  Osteopenia.    HALLEY ESPINOZA MD         SYSTEM ID:  [id number]   XR Wrist Right G/E 3 Views     Status: None    Narrative    XR WRIST RIGHT G/E 3 VIEWS  9/25/2023 11:14 AM     HISTORY: fall, right wrist pain  COMPARISON: None      Impression    IMPRESSION:  Acute distal radial metaphysis fracture with dorsal  impaction and angulation. There is likely intra-articular extension.  There appears to be a tiny ulnar styloid process fracture. Surrounding  soft tissue swelling. There is normal joint alignment. Osteopenia.    HALLEY ESPINOZA MD         SYSTEM ID:  [id number]   [place] Draw     Status: None    Narrative    The following orders were created for panel order [place] Draw.  Procedure                               Abnormality         Status                     ---------                               -----------         ------                     Extra Blue Top Tube[[id number]]                              Final result                Extra Red Top Tube[[id number]]                               Final result               Extra Green Top (Lithium...[[id number]]                      Final result               Extra Purple Top Tube[[id number]]                            Final result                 Please view results for these tests on the individual orders.   Extra Blue Top Tube     Status: None   Result Value Ref Range    Hold Specimen JIC    Extra Red Top Tube     Status: None   Result Value Ref Range    Hold Specimen JIC    Extra Green Top (Lithium Heparin) Tube     Status: None   Result Value Ref Range    Hold Specimen JIC    Extra Purple Top Tube     Status: None   Result Value Ref Range    Hold Specimen JIC    Adult Type and Screen     Status: None (Preliminary result)   Result Value Ref Range    SPECIMEN EXPIRATION DATE [id number]    ABO/Rh type and screen     Status: None (In process)    Narrative    The following orders were created for panel order ABO/Rh type and screen.  Procedure                               Abnormality         Status                     ---------                               -----------         ------                     Adult Type and Screen[[id number]]                            Preliminary result           Please view results for these tests on the individual orders.          Attestation:  I have reviewed today's vital signs, notes, medications, labs and imaging with Dr. Chucky Lane.  Amount of time performed on this consult: 60 minutes.    Myah Skinner PA-C  [facility]
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
## 2023-09-25 NOTE — CONSULTS
Hospitalist short admission note, full H&P to follow    Ms. Payan is a 78-year-old female with past medical history significant for anxiety depression migraines GERD and a history of T12 burst fracture related to a previous fall who presents to the emergency room after another mechanical fall earlier this morning.  She was taking out the trash when she lost her balance and fell to the right side.  She denies hitting her head or loss of consciousness.  Work-up in emergency room reveals unremarkable labs  X-ray shows acute comminuted intertrochanteric fracture of the right femur and a displaced lesser trochanteric fracture fragment.  She also has a acute distal radial fracture and a tiny styloid process fracture.     She is being admitted to the hospital for definitive therapies.    Plan:    Patient was seen by orthopedic surgery plan to proceed with ORIF of the right hip tomorrow with Dr. Lane    In regards to her right distal radius ulnar styloid fracture, this has now been placed in a splint plan would be to use pain control cold compress possible surgery on 927 pending surgeon in OR availability.     Otherwise patient is stable from medical standpoint, she does not have any history of recent angina no recent illness no pulmonary or cardiac discomfort. I will get a baseline EKG though. Pending any significant abnormality, she should be able to proceed to the OR without further

## 2023-09-25 NOTE — ED NOTES
[facility]  ED Nurse Handoff Report    ED Chief complaint: Fall (Fall from standing, complains of right hip and wrist pain.)  . ED Diagnosis:   Final diagnoses:   Closed displaced intertrochanteric fracture of right femur, initial encounter (H)       Allergies:   Allergies   Allergen Reactions    Hydrochlorothiazide Hives    Contrast Dye     Iodinated Contrast Media Itching    Levofloxacin     Sulfa Antibiotics Itching     topical       Code Status: Full Code    Activity level - Baseline/Home:  independent.  Activity Level - Current:   lift. , fx R hip  Lift room needed: Yes.   Bariatric: No   Needed: No   Isolation: No.   Infection: Not Applicable.     Respiratory status: Room air    Vital Signs (within 30 minutes):   Vitals:    09/25/23 1141 09/25/23 1142 09/25/23 1143 09/25/23 1145   BP:    136/59   Pulse:    59   Resp:       Temp:       TempSrc:       SpO2: 92% 93% 94% 98%   Weight:           Cardiac Rhythm:  ,      Pain level:    Patient confused: No.   Patient Falls Risk: arm band in place and patient and family education.   Elimination Status: Has voided , continent at baseline, denies need for UA    Patient Report - Initial Complaint: fall.   Focused Assessment:     Patient BIBA for an unwitnessed fall. Patient was taking out the trash and lost balance and falling on right side. Complains of right hip and wrist pain. Right leg shortening and external rotation. EMS Attempted to place C collar patient did not tolerate no complaints of neck or back pain.50 mcg of fentanyl intranasal given en route patient complains of severe pain upon arrival. Patent yelling trembling, tearful. Hx of L 12 fracture.        1225  Musculoskeletal  JJ    Musculoskeletal (Adult)Musculoskeletal WDL: .WDL except (R hip anterior upper leg deformity with external rotations and RLE shortened.)        1224  Musculoskeletal  JJ    Musculoskeletal (Adult)Musculoskeletal WDL: .WDL except (R wrist pain after  fall, edema without gross deformity, CMS int act, pulses present)          Abnormal Results:   Labs Ordered and Resulted from Time of ED Arrival to Time of ED Departure - No data to display     XR Wrist Right G/E 3 Views   Final Result   IMPRESSION:  Acute distal radial metaphysis fracture with dorsal   impaction and angulation. There is likely intra-articular extension.   There appears to be a tiny ulnar styloid process fracture. Surrounding   soft tissue swelling. There is normal joint alignment. Osteopenia.      HALLEY ESPINOZA MD            SYSTEM ID:  [id number]      XR Femur Right 2 Views   Final Result   IMPRESSION: Acute comminuted intertrochanteric fracture of the right   femur with superolateral displacement and coxa vara angulation. There   is a displaced lesser trochanteric fracture fragment. Normal joint   alignment. Mild degenerative changes throughout the pelvis.   Osteopenia.      HALLEY ESPINOZA MD            SYSTEM ID:  [id number]      XR Pelvis 1/2 Views   Final Result   IMPRESSION: Acute comminuted intertrochanteric fracture of the right   femur with superolateral displacement and coxa vara angulation. There   is a displaced lesser trochanteric fracture fragment. Normal joint   alignment. Mild degenerative changes throughout the pelvis.   Osteopenia.      HALLEY ESPINOZA MD            SYSTEM ID:  [id number]      CT Head w/o Contrast   Final Result   IMPRESSION: No acute intracranial abnormality.      DONNA VERA MD            SYSTEM ID:  [id number]          Treatments provided: pain control  Family Comments: daughters x 2 at bedside  OBS brochure/video discussed/provided to patient:  No  ED Medications:   Medications   Vitamin D3 (CHOLECALCIFEROL) tablet 50 mcg (has no administration in time range)   HYDROmorphone (DILAUDID) injection 1 mg (1 mg Intravenous $Given 9/25/23 1026)       Drips infusing:  No  For the majority of the shift this patient was Green.   Interventions performed were  n/a.    Sepsis treatment initiated: No    Cares/treatment/interventions/medications to be completed following ED care: splinting R wrist      ED Nurse Name: Dee Dee Almazan RN  12:26 PM    RECEIVING UNIT ED HANDOFF REVIEW    Above ED Nurse Handoff Report was reviewed: Yes  Reviewed by: Livia Lizarraga RN on September 25, 2023 at 5:52 PM

## 2023-09-26 ENCOUNTER — APPOINTMENT (OUTPATIENT)
Dept: GENERAL RADIOLOGY | Facility: CLINIC | Age: 78
DRG: 481 | End: 2023-09-26
Attending: ORTHOPAEDIC SURGERY
Payer: MEDICARE

## 2023-09-26 ENCOUNTER — ANESTHESIA EVENT (OUTPATIENT)
Dept: SURGERY | Facility: CLINIC | Age: 78
DRG: 481 | End: 2023-09-26
Payer: MEDICARE

## 2023-09-26 ENCOUNTER — ANESTHESIA (OUTPATIENT)
Dept: SURGERY | Facility: CLINIC | Age: 78
DRG: 481 | End: 2023-09-26
Payer: MEDICARE

## 2023-09-26 LAB
ANION GAP SERPL CALCULATED.3IONS-SCNC: 8 MMOL/L (ref 7–15)
BLD PROD TYP BPU: NORMAL
BLOOD COMPONENT TYPE: NORMAL
BUN SERPL-MCNC: 13.8 MG/DL (ref 8–23)
CALCIUM SERPL-MCNC: 8.3 MG/DL (ref 8.8–10.2)
CHLORIDE SERPL-SCNC: 105 MMOL/L (ref 98–107)
CODING SYSTEM: NORMAL
CREAT SERPL-MCNC: 0.64 MG/DL (ref 0.51–0.95)
CROSSMATCH: NORMAL
DEPRECATED HCO3 PLAS-SCNC: 26 MMOL/L (ref 22–29)
EGFRCR SERPLBLD CKD-EPI 2021: >90 ML/MIN/1.73M2
ERYTHROCYTE [DISTWIDTH] IN BLOOD BY AUTOMATED COUNT: 12.3 % (ref 10–15)
GLUCOSE SERPL-MCNC: 99 MG/DL (ref 70–99)
HCT VFR BLD AUTO: 27 % (ref 35–47)
HGB BLD-MCNC: 7.5 G/DL (ref 11.7–15.7)
HGB BLD-MCNC: 8.9 G/DL (ref 11.7–15.7)
INR PPP: 1.04 (ref 0.85–1.15)
ISSUE DATE AND TIME: NORMAL
MCH RBC QN AUTO: 34.8 PG (ref 26.5–33)
MCHC RBC AUTO-ENTMCNC: 33 G/DL (ref 31.5–36.5)
MCV RBC AUTO: 106 FL (ref 78–100)
PLATELET # BLD AUTO: 158 10E3/UL (ref 150–450)
POTASSIUM SERPL-SCNC: 3.9 MMOL/L (ref 3.4–5.3)
RBC # BLD AUTO: 2.56 10E6/UL (ref 3.8–5.2)
SODIUM SERPL-SCNC: 139 MMOL/L (ref 136–145)
UNIT ABO/RH: NORMAL
UNIT NUMBER: NORMAL
UNIT STATUS: NORMAL
UNIT TYPE ISBT: 6200
WBC # BLD AUTO: 5.8 10E3/UL (ref 4–11)

## 2023-09-26 PROCEDURE — 0QS606Z REPOSITION RIGHT UPPER FEMUR WITH INTRAMEDULLARY INTERNAL FIXATION DEVICE, OPEN APPROACH: ICD-10-PCS | Performed by: ORTHOPAEDIC SURGERY

## 2023-09-26 PROCEDURE — 250N000011 HC RX IP 250 OP 636: Mod: JZ | Performed by: ORTHOPAEDIC SURGERY

## 2023-09-26 PROCEDURE — 80048 BASIC METABOLIC PNL TOTAL CA: CPT | Performed by: PHYSICIAN ASSISTANT

## 2023-09-26 PROCEDURE — 250N000013 HC RX MED GY IP 250 OP 250 PS 637: Performed by: INTERNAL MEDICINE

## 2023-09-26 PROCEDURE — C1713 ANCHOR/SCREW BN/BN,TIS/BN: HCPCS | Performed by: ORTHOPAEDIC SURGERY

## 2023-09-26 PROCEDURE — 250N000013 HC RX MED GY IP 250 OP 250 PS 637: Performed by: PHYSICIAN ASSISTANT

## 2023-09-26 PROCEDURE — 250N000009 HC RX 250: Performed by: PHYSICIAN ASSISTANT

## 2023-09-26 PROCEDURE — 258N000003 HC RX IP 258 OP 636: Performed by: ORTHOPAEDIC SURGERY

## 2023-09-26 PROCEDURE — 250N000025 HC SEVOFLURANE, PER MIN: Performed by: ORTHOPAEDIC SURGERY

## 2023-09-26 PROCEDURE — 250N000011 HC RX IP 250 OP 636: Performed by: INTERNAL MEDICINE

## 2023-09-26 PROCEDURE — 85018 HEMOGLOBIN: CPT | Performed by: INTERNAL MEDICINE

## 2023-09-26 PROCEDURE — 120N000001 HC R&B MED SURG/OB

## 2023-09-26 PROCEDURE — 250N000009 HC RX 250: Performed by: NURSE ANESTHETIST, CERTIFIED REGISTERED

## 2023-09-26 PROCEDURE — 36415 COLL VENOUS BLD VENIPUNCTURE: CPT | Performed by: INTERNAL MEDICINE

## 2023-09-26 PROCEDURE — 999N000141 HC STATISTIC PRE-PROCEDURE NURSING ASSESSMENT: Performed by: ORTHOPAEDIC SURGERY

## 2023-09-26 PROCEDURE — P9016 RBC LEUKOCYTES REDUCED: HCPCS | Performed by: INTERNAL MEDICINE

## 2023-09-26 PROCEDURE — 85027 COMPLETE CBC AUTOMATED: CPT | Performed by: PHYSICIAN ASSISTANT

## 2023-09-26 PROCEDURE — 258N000003 HC RX IP 258 OP 636: Performed by: NURSE ANESTHETIST, CERTIFIED REGISTERED

## 2023-09-26 PROCEDURE — 250N000011 HC RX IP 250 OP 636: Performed by: PHYSICIAN ASSISTANT

## 2023-09-26 PROCEDURE — 710N000009 HC RECOVERY PHASE 1, LEVEL 1, PER MIN: Performed by: ORTHOPAEDIC SURGERY

## 2023-09-26 PROCEDURE — 250N000011 HC RX IP 250 OP 636: Mod: JZ | Performed by: NURSE ANESTHETIST, CERTIFIED REGISTERED

## 2023-09-26 PROCEDURE — 85610 PROTHROMBIN TIME: CPT | Performed by: INTERNAL MEDICINE

## 2023-09-26 PROCEDURE — 99232 SBSQ HOSP IP/OBS MODERATE 35: CPT | Performed by: INTERNAL MEDICINE

## 2023-09-26 PROCEDURE — 250N000011 HC RX IP 250 OP 636

## 2023-09-26 PROCEDURE — 258N000003 HC RX IP 258 OP 636: Performed by: PHYSICIAN ASSISTANT

## 2023-09-26 PROCEDURE — 999N000179 XR SURGERY CARM FLUORO LESS THAN 5 MIN W STILLS: Mod: TC

## 2023-09-26 PROCEDURE — 36415 COLL VENOUS BLD VENIPUNCTURE: CPT | Performed by: PHYSICIAN ASSISTANT

## 2023-09-26 PROCEDURE — 370N000017 HC ANESTHESIA TECHNICAL FEE, PER MIN: Performed by: ORTHOPAEDIC SURGERY

## 2023-09-26 PROCEDURE — 360N000083 HC SURGERY LEVEL 3 W/ FLUORO, PER MIN: Performed by: ORTHOPAEDIC SURGERY

## 2023-09-26 PROCEDURE — 272N000001 HC OR GENERAL SUPPLY STERILE: Performed by: ORTHOPAEDIC SURGERY

## 2023-09-26 PROCEDURE — 250N000013 HC RX MED GY IP 250 OP 250 PS 637

## 2023-09-26 DEVICE — IMP SCR BONE STRK G3 LAG 10.5X85MM TI 3060-0085S: Type: IMPLANTABLE DEVICE | Site: HIP | Status: FUNCTIONAL

## 2023-09-26 DEVICE — IMP WIRE KIRSCHNER 3.2X450MM 1210-6450S: Type: IMPLANTABLE DEVICE | Site: HIP | Status: FUNCTIONAL

## 2023-09-26 DEVICE — IMP NAIL STRK TROCH IM GAMMA 130DEG 11X180MM 3130-1180S: Type: IMPLANTABLE DEVICE | Site: HIP | Status: FUNCTIONAL

## 2023-09-26 DEVICE — IMP SCR STRK LOCK 5.0X35MM FT 1896-5035S: Type: IMPLANTABLE DEVICE | Site: HIP | Status: FUNCTIONAL

## 2023-09-26 RX ORDER — TRANEXAMIC ACID 10 MG/ML
1 INJECTION, SOLUTION INTRAVENOUS ONCE
Status: DISCONTINUED | OUTPATIENT
Start: 2023-09-26 | End: 2023-09-26 | Stop reason: HOSPADM

## 2023-09-26 RX ORDER — OXYCODONE HYDROCHLORIDE 5 MG/1
10 TABLET ORAL EVERY 4 HOURS PRN
Status: DISCONTINUED | OUTPATIENT
Start: 2023-09-26 | End: 2023-09-26

## 2023-09-26 RX ORDER — LIDOCAINE 40 MG/G
CREAM TOPICAL
Status: DISCONTINUED | OUTPATIENT
Start: 2023-09-26 | End: 2023-09-26 | Stop reason: HOSPADM

## 2023-09-26 RX ORDER — ONDANSETRON 2 MG/ML
INJECTION INTRAMUSCULAR; INTRAVENOUS PRN
Status: DISCONTINUED | OUTPATIENT
Start: 2023-09-26 | End: 2023-09-26

## 2023-09-26 RX ORDER — PROCHLORPERAZINE MALEATE 5 MG
5 TABLET ORAL EVERY 6 HOURS PRN
Status: DISCONTINUED | OUTPATIENT
Start: 2023-09-26 | End: 2023-10-01 | Stop reason: HOSPADM

## 2023-09-26 RX ORDER — NALOXONE HYDROCHLORIDE 0.4 MG/ML
0.4 INJECTION, SOLUTION INTRAMUSCULAR; INTRAVENOUS; SUBCUTANEOUS
Status: DISCONTINUED | OUTPATIENT
Start: 2023-09-26 | End: 2023-10-01 | Stop reason: HOSPADM

## 2023-09-26 RX ORDER — PROPOFOL 10 MG/ML
INJECTION, EMULSION INTRAVENOUS PRN
Status: DISCONTINUED | OUTPATIENT
Start: 2023-09-26 | End: 2023-09-26

## 2023-09-26 RX ORDER — BISACODYL 10 MG
10 SUPPOSITORY, RECTAL RECTAL DAILY PRN
Status: DISCONTINUED | OUTPATIENT
Start: 2023-09-26 | End: 2023-10-01 | Stop reason: HOSPADM

## 2023-09-26 RX ORDER — GLYCOPYRROLATE 0.2 MG/ML
INJECTION, SOLUTION INTRAMUSCULAR; INTRAVENOUS PRN
Status: DISCONTINUED | OUTPATIENT
Start: 2023-09-26 | End: 2023-09-26

## 2023-09-26 RX ORDER — OXYCODONE HYDROCHLORIDE 5 MG/1
5 TABLET ORAL EVERY 4 HOURS PRN
Status: DISCONTINUED | OUTPATIENT
Start: 2023-09-26 | End: 2023-10-01 | Stop reason: HOSPADM

## 2023-09-26 RX ORDER — OXYCODONE HYDROCHLORIDE 5 MG/1
5 TABLET ORAL
Status: DISCONTINUED | OUTPATIENT
Start: 2023-09-26 | End: 2023-09-26 | Stop reason: HOSPADM

## 2023-09-26 RX ORDER — ONDANSETRON 4 MG/1
4 TABLET, ORALLY DISINTEGRATING ORAL EVERY 30 MIN PRN
Status: DISCONTINUED | OUTPATIENT
Start: 2023-09-26 | End: 2023-09-26 | Stop reason: HOSPADM

## 2023-09-26 RX ORDER — NALOXONE HYDROCHLORIDE 0.4 MG/ML
0.2 INJECTION, SOLUTION INTRAMUSCULAR; INTRAVENOUS; SUBCUTANEOUS
Status: DISCONTINUED | OUTPATIENT
Start: 2023-09-26 | End: 2023-10-01 | Stop reason: HOSPADM

## 2023-09-26 RX ORDER — ONDANSETRON 2 MG/ML
4 INJECTION INTRAMUSCULAR; INTRAVENOUS EVERY 30 MIN PRN
Status: DISCONTINUED | OUTPATIENT
Start: 2023-09-26 | End: 2023-09-26 | Stop reason: HOSPADM

## 2023-09-26 RX ORDER — MORPHINE SULFATE 2 MG/ML
4 INJECTION, SOLUTION INTRAMUSCULAR; INTRAVENOUS ONCE
Status: COMPLETED | OUTPATIENT
Start: 2023-09-26 | End: 2023-09-26

## 2023-09-26 RX ORDER — FENTANYL CITRATE 50 UG/ML
25 INJECTION, SOLUTION INTRAMUSCULAR; INTRAVENOUS EVERY 5 MIN PRN
Status: DISCONTINUED | OUTPATIENT
Start: 2023-09-26 | End: 2023-09-26 | Stop reason: HOSPADM

## 2023-09-26 RX ORDER — CEFAZOLIN SODIUM 1 G/3ML
1 INJECTION, POWDER, FOR SOLUTION INTRAMUSCULAR; INTRAVENOUS EVERY 8 HOURS
Status: COMPLETED | OUTPATIENT
Start: 2023-09-26 | End: 2023-09-27

## 2023-09-26 RX ORDER — ASPIRIN 325 MG
325 TABLET, DELAYED RELEASE (ENTERIC COATED) ORAL DAILY
Status: DISCONTINUED | OUTPATIENT
Start: 2023-09-26 | End: 2023-10-01 | Stop reason: HOSPADM

## 2023-09-26 RX ORDER — SODIUM CHLORIDE, SODIUM LACTATE, POTASSIUM CHLORIDE, CALCIUM CHLORIDE 600; 310; 30; 20 MG/100ML; MG/100ML; MG/100ML; MG/100ML
INJECTION, SOLUTION INTRAVENOUS CONTINUOUS PRN
Status: DISCONTINUED | OUTPATIENT
Start: 2023-09-26 | End: 2023-09-26

## 2023-09-26 RX ORDER — HYDROMORPHONE HCL IN WATER/PF 6 MG/30 ML
0.4 PATIENT CONTROLLED ANALGESIA SYRINGE INTRAVENOUS
Status: DISCONTINUED | OUTPATIENT
Start: 2023-09-26 | End: 2023-10-01 | Stop reason: HOSPADM

## 2023-09-26 RX ORDER — CEFAZOLIN SODIUM/WATER 2 G/20 ML
2 SYRINGE (ML) INTRAVENOUS
Status: COMPLETED | OUTPATIENT
Start: 2023-09-26 | End: 2023-09-26

## 2023-09-26 RX ORDER — OXYCODONE HYDROCHLORIDE 5 MG/1
10 TABLET ORAL
Status: DISCONTINUED | OUTPATIENT
Start: 2023-09-26 | End: 2023-09-26 | Stop reason: HOSPADM

## 2023-09-26 RX ORDER — ACETAMINOPHEN 325 MG/1
975 TABLET ORAL EVERY 8 HOURS
Status: DISCONTINUED | OUTPATIENT
Start: 2023-09-26 | End: 2023-09-27

## 2023-09-26 RX ORDER — ACETAMINOPHEN 325 MG/1
650 TABLET ORAL EVERY 4 HOURS PRN
Status: DISCONTINUED | OUTPATIENT
Start: 2023-09-29 | End: 2023-10-01

## 2023-09-26 RX ORDER — HYDROMORPHONE HCL IN WATER/PF 6 MG/30 ML
0.2 PATIENT CONTROLLED ANALGESIA SYRINGE INTRAVENOUS
Status: DISCONTINUED | OUTPATIENT
Start: 2023-09-26 | End: 2023-10-01 | Stop reason: HOSPADM

## 2023-09-26 RX ORDER — SODIUM CHLORIDE, SODIUM LACTATE, POTASSIUM CHLORIDE, CALCIUM CHLORIDE 600; 310; 30; 20 MG/100ML; MG/100ML; MG/100ML; MG/100ML
INJECTION, SOLUTION INTRAVENOUS CONTINUOUS
Status: DISCONTINUED | OUTPATIENT
Start: 2023-09-26 | End: 2023-09-26 | Stop reason: HOSPADM

## 2023-09-26 RX ORDER — HYDROMORPHONE HCL IN WATER/PF 6 MG/30 ML
0.2 PATIENT CONTROLLED ANALGESIA SYRINGE INTRAVENOUS EVERY 5 MIN PRN
Status: DISCONTINUED | OUTPATIENT
Start: 2023-09-26 | End: 2023-09-26 | Stop reason: HOSPADM

## 2023-09-26 RX ORDER — OXYCODONE HYDROCHLORIDE 5 MG/1
5 TABLET ORAL EVERY 4 HOURS PRN
Status: DISCONTINUED | OUTPATIENT
Start: 2023-09-26 | End: 2023-09-26

## 2023-09-26 RX ORDER — ONDANSETRON 4 MG/1
4 TABLET, ORALLY DISINTEGRATING ORAL EVERY 6 HOURS PRN
Status: DISCONTINUED | OUTPATIENT
Start: 2023-09-26 | End: 2023-10-01 | Stop reason: HOSPADM

## 2023-09-26 RX ORDER — ONDANSETRON 2 MG/ML
4 INJECTION INTRAMUSCULAR; INTRAVENOUS EVERY 6 HOURS PRN
Status: DISCONTINUED | OUTPATIENT
Start: 2023-09-26 | End: 2023-10-01 | Stop reason: HOSPADM

## 2023-09-26 RX ORDER — FAMOTIDINE 20 MG/1
20 TABLET, FILM COATED ORAL 2 TIMES DAILY
Status: DISCONTINUED | OUTPATIENT
Start: 2023-09-26 | End: 2023-10-01 | Stop reason: HOSPADM

## 2023-09-26 RX ORDER — HYDROMORPHONE HCL IN WATER/PF 6 MG/30 ML
0.4 PATIENT CONTROLLED ANALGESIA SYRINGE INTRAVENOUS EVERY 5 MIN PRN
Status: DISCONTINUED | OUTPATIENT
Start: 2023-09-26 | End: 2023-09-26 | Stop reason: HOSPADM

## 2023-09-26 RX ORDER — AMOXICILLIN 250 MG
1 CAPSULE ORAL 2 TIMES DAILY
Status: DISCONTINUED | OUTPATIENT
Start: 2023-09-26 | End: 2023-10-01 | Stop reason: HOSPADM

## 2023-09-26 RX ORDER — DEXAMETHASONE SODIUM PHOSPHATE 4 MG/ML
INJECTION, SOLUTION INTRA-ARTICULAR; INTRALESIONAL; INTRAMUSCULAR; INTRAVENOUS; SOFT TISSUE PRN
Status: DISCONTINUED | OUTPATIENT
Start: 2023-09-26 | End: 2023-09-26

## 2023-09-26 RX ORDER — POLYETHYLENE GLYCOL 3350 17 G/17G
17 POWDER, FOR SOLUTION ORAL DAILY
Status: DISCONTINUED | OUTPATIENT
Start: 2023-09-27 | End: 2023-10-01 | Stop reason: HOSPADM

## 2023-09-26 RX ORDER — FENTANYL CITRATE 50 UG/ML
50 INJECTION, SOLUTION INTRAMUSCULAR; INTRAVENOUS EVERY 5 MIN PRN
Status: DISCONTINUED | OUTPATIENT
Start: 2023-09-26 | End: 2023-09-26 | Stop reason: HOSPADM

## 2023-09-26 RX ORDER — TRANEXAMIC ACID 10 MG/ML
1 INJECTION, SOLUTION INTRAVENOUS ONCE
Status: COMPLETED | OUTPATIENT
Start: 2023-09-26 | End: 2023-09-26

## 2023-09-26 RX ORDER — SODIUM CHLORIDE, SODIUM LACTATE, POTASSIUM CHLORIDE, CALCIUM CHLORIDE 600; 310; 30; 20 MG/100ML; MG/100ML; MG/100ML; MG/100ML
INJECTION, SOLUTION INTRAVENOUS CONTINUOUS
Status: DISCONTINUED | OUTPATIENT
Start: 2023-09-26 | End: 2023-09-30

## 2023-09-26 RX ORDER — LABETALOL 20 MG/4 ML (5 MG/ML) INTRAVENOUS SYRINGE
PRN
Status: DISCONTINUED | OUTPATIENT
Start: 2023-09-26 | End: 2023-09-26

## 2023-09-26 RX ORDER — CEFAZOLIN SODIUM/WATER 2 G/20 ML
2 SYRINGE (ML) INTRAVENOUS SEE ADMIN INSTRUCTIONS
Status: DISCONTINUED | OUTPATIENT
Start: 2023-09-26 | End: 2023-09-26 | Stop reason: HOSPADM

## 2023-09-26 RX ORDER — FENTANYL CITRATE 50 UG/ML
INJECTION, SOLUTION INTRAMUSCULAR; INTRAVENOUS PRN
Status: DISCONTINUED | OUTPATIENT
Start: 2023-09-26 | End: 2023-09-26

## 2023-09-26 RX ORDER — HYDROMORPHONE HYDROCHLORIDE 1 MG/ML
0.3 INJECTION, SOLUTION INTRAMUSCULAR; INTRAVENOUS; SUBCUTANEOUS ONCE
Status: COMPLETED | OUTPATIENT
Start: 2023-09-26 | End: 2023-09-26

## 2023-09-26 RX ORDER — LIDOCAINE 40 MG/G
CREAM TOPICAL
Status: DISCONTINUED | OUTPATIENT
Start: 2023-09-26 | End: 2023-10-01 | Stop reason: HOSPADM

## 2023-09-26 RX ORDER — NEOSTIGMINE METHYLSULFATE 1 MG/ML
VIAL (ML) INJECTION PRN
Status: DISCONTINUED | OUTPATIENT
Start: 2023-09-26 | End: 2023-09-26

## 2023-09-26 RX ADMIN — ACETAMINOPHEN 975 MG: 325 TABLET, FILM COATED ORAL at 05:58

## 2023-09-26 RX ADMIN — METHOCARBAMOL 500 MG: 500 TABLET ORAL at 07:45

## 2023-09-26 RX ADMIN — NEOSTIGMINE METHYLSULFATE 2 MG: 1 INJECTION, SOLUTION INTRAVENOUS at 10:00

## 2023-09-26 RX ADMIN — PROPOFOL 20 MG: 10 INJECTION, EMULSION INTRAVENOUS at 09:58

## 2023-09-26 RX ADMIN — GLYCOPYRROLATE 0.1 MG: 0.2 INJECTION, SOLUTION INTRAMUSCULAR; INTRAVENOUS at 10:00

## 2023-09-26 RX ADMIN — TRAZODONE HYDROCHLORIDE 100 MG: 100 TABLET ORAL at 21:47

## 2023-09-26 RX ADMIN — HYDROMORPHONE HYDROCHLORIDE 0.5 MG: 1 INJECTION, SOLUTION INTRAMUSCULAR; INTRAVENOUS; SUBCUTANEOUS at 05:57

## 2023-09-26 RX ADMIN — PROPOFOL 100 MG: 10 INJECTION, EMULSION INTRAVENOUS at 09:17

## 2023-09-26 RX ADMIN — FENTANYL CITRATE 50 MCG: 50 INJECTION, SOLUTION INTRAMUSCULAR; INTRAVENOUS at 09:30

## 2023-09-26 RX ADMIN — FENTANYL CITRATE 50 MCG: 50 INJECTION, SOLUTION INTRAMUSCULAR; INTRAVENOUS at 09:17

## 2023-09-26 RX ADMIN — SODIUM CHLORIDE, POTASSIUM CHLORIDE, SODIUM LACTATE AND CALCIUM CHLORIDE: 600; 310; 30; 20 INJECTION, SOLUTION INTRAVENOUS at 09:00

## 2023-09-26 RX ADMIN — FENTANYL CITRATE 25 MCG: 50 INJECTION, SOLUTION INTRAMUSCULAR; INTRAVENOUS at 10:02

## 2023-09-26 RX ADMIN — ROCURONIUM BROMIDE 30 MG: 50 INJECTION, SOLUTION INTRAVENOUS at 09:17

## 2023-09-26 RX ADMIN — LABETALOL 20 MG/4 ML (5 MG/ML) INTRAVENOUS SYRINGE 2.5 MG: at 10:02

## 2023-09-26 RX ADMIN — Medication 2 G: at 09:08

## 2023-09-26 RX ADMIN — HYDROMORPHONE HYDROCHLORIDE 0.5 MG: 1 INJECTION, SOLUTION INTRAMUSCULAR; INTRAVENOUS; SUBCUTANEOUS at 03:06

## 2023-09-26 RX ADMIN — ACETAMINOPHEN 975 MG: 325 TABLET, FILM COATED ORAL at 13:17

## 2023-09-26 RX ADMIN — FENTANYL CITRATE 25 MCG: 50 INJECTION, SOLUTION INTRAMUSCULAR; INTRAVENOUS at 09:58

## 2023-09-26 RX ADMIN — ACETAMINOPHEN 975 MG: 325 TABLET, FILM COATED ORAL at 20:03

## 2023-09-26 RX ADMIN — SENNOSIDES AND DOCUSATE SODIUM 1 TABLET: 8.6; 5 TABLET ORAL at 20:03

## 2023-09-26 RX ADMIN — HYDROXYZINE HYDROCHLORIDE 50 MG: 25 TABLET ORAL at 07:45

## 2023-09-26 RX ADMIN — GLYCOPYRROLATE 0.2 MG: 0.2 INJECTION, SOLUTION INTRAMUSCULAR; INTRAVENOUS at 09:17

## 2023-09-26 RX ADMIN — FENTANYL CITRATE 50 MCG: 50 INJECTION, SOLUTION INTRAMUSCULAR; INTRAVENOUS at 09:39

## 2023-09-26 RX ADMIN — FAMOTIDINE 20 MG: 20 TABLET ORAL at 20:03

## 2023-09-26 RX ADMIN — DEXAMETHASONE SODIUM PHOSPHATE 4 MG: 4 INJECTION, SOLUTION INTRA-ARTICULAR; INTRALESIONAL; INTRAMUSCULAR; INTRAVENOUS; SOFT TISSUE at 09:17

## 2023-09-26 RX ADMIN — SODIUM CHLORIDE: 9 INJECTION, SOLUTION INTRAVENOUS at 03:10

## 2023-09-26 RX ADMIN — GLYCOPYRROLATE 0.1 MG: 0.2 INJECTION, SOLUTION INTRAMUSCULAR; INTRAVENOUS at 10:04

## 2023-09-26 RX ADMIN — CEFAZOLIN 1 G: 1 INJECTION, POWDER, FOR SOLUTION INTRAMUSCULAR; INTRAVENOUS at 16:52

## 2023-09-26 RX ADMIN — LABETALOL 20 MG/4 ML (5 MG/ML) INTRAVENOUS SYRINGE 5 MG: at 09:41

## 2023-09-26 RX ADMIN — TRANEXAMIC ACID 1 G: 10 INJECTION, SOLUTION INTRAVENOUS at 09:18

## 2023-09-26 RX ADMIN — ONDANSETRON 4 MG: 2 INJECTION INTRAMUSCULAR; INTRAVENOUS at 09:54

## 2023-09-26 RX ADMIN — CEFAZOLIN 1 G: 1 INJECTION, POWDER, FOR SOLUTION INTRAMUSCULAR; INTRAVENOUS at 23:51

## 2023-09-26 RX ADMIN — MORPHINE SULFATE 4 MG: 2 INJECTION, SOLUTION INTRAMUSCULAR; INTRAVENOUS at 07:49

## 2023-09-26 RX ADMIN — LEVOTHYROXINE SODIUM 75 MCG: 0.07 TABLET ORAL at 07:46

## 2023-09-26 ASSESSMENT — ACTIVITIES OF DAILY LIVING (ADL)
ADLS_ACUITY_SCORE: 22
ADLS_ACUITY_SCORE: 40
ADLS_ACUITY_SCORE: 40
ADLS_ACUITY_SCORE: 22
ADLS_ACUITY_SCORE: 40
ADLS_ACUITY_SCORE: 34
ADLS_ACUITY_SCORE: 40
ADLS_ACUITY_SCORE: 24
ADLS_ACUITY_SCORE: 40
ADLS_ACUITY_SCORE: 40
ADLS_ACUITY_SCORE: 22
ADLS_ACUITY_SCORE: 40

## 2023-09-26 NOTE — ANESTHESIA PROCEDURE NOTES
Airway       Patient location during procedure: OR       Procedure Start/Stop Times: 9/26/2023 9:15 AM  Staff -        Anesthesiologist:  Jimbo Marcum DO       CRNA: Vicki Carson APRN CRNA       Performed By: anesthesiologist and CRNA  Consent for Airway        Urgency: elective  Indications and Patient Condition       Indications for airway management: jany-procedural       Induction type:intravenous       Mask difficulty assessment: 1 - vent by mask (microagnathia, decreased ROM)    Final Airway Details       Final airway type: endotracheal airway       Successful airway: ETT - single and Oral  Endotracheal Airway Details        ETT size (mm): 6.0       Cuffed: yes       Successful intubation technique: video laryngoscopy       VL Blade Size: Glidescope 3       Grade View of Cords: 1       Adjucts: stylet       Position: Right       Measured from: gums/teeth       Secured at (cm): 19       Bite block used: None    Post intubation assessment        Placement verified by: capnometry, equal breath sounds and chest rise        Number of attempts at approach: 1       Number of other approaches attempted: 0       Secured with: plastic tape       Ease of procedure: easy       Dentition: Unchanged    Medication(s) Administered   Medication Administration Time: 9/26/2023 9:15 AM

## 2023-09-26 NOTE — OP NOTE
Preoperative diagnosis:  Intertrochanteric right hip fracture    Postoperative diagnosis:  As above    Procedure:  Closed reduction insertion intramedullary nail right hip    Surgeon:  Chucky Lane MD     Assistant:  Adriane Lazo PA-C  A physicians assistant was available for the surgery and participated to decrease the patient's morbidity by assisting with positioning, manipulation of the limb during the procedure, surgical retraction as necessary, closure of the surgical wound and transferring the patient back to a hospital bed  Indication for Procedure    Anesthesia:    Estimated blood loss:    Complications:  None readily apparent    Indications for procedure:  Debbie Boucher is a 78 year old female who sustained a fall from standing resulting in immediate hip pain. The patient was evaluated and noted to have a fracture in the emergency room. She was admitted for treatment. There, we evaluated the x-rays and noted to have a displaced intertrochanteric hip fracture. The patient was counseled regarding treatment options and recommendation was made for cephalomedullary nail fixation in order to allow early weight bearing. She was amenable to this plan as was the family. They understood the risks of the procedure and wished to proceed.    Description of Procedure:  Debbie was identified in the preoperative area. Informed consent was obtained as outlined above.  The patient was in agreement.  Subsequently, the hip was marked. The patient was then brought back to the operating room where they were placed under an appropriate anesthetic. The patient was then carefully positioned on the fracture table ensuring all bony prominences were well padded, and a closed reduction was performed. The contralateral leg was then placed into the well leg armijo, and a C-arm was used to ensure adequate reduction of the injury. This being achieved, the patient was then prepped and draped in a standard sterile fashion. A time-out was  called by the surgical team. All in attendance were in agreement that the right hip was the correct operative site.  The correct laterality, procedure and hospital identification number were confirmed.     A guide wire placement into the tip of the greater trochanter. An incision was created to allow placement of the guide all to ensure proper placement of the starting point on orthogonal views using fluoroscopic imagine intensification. The guide pin was then inserted to the level of the lesser trochanter and the opening reamer was used to open the proximal aspect of the femur. At that point, the TFN nail was inserted manually. Next, using the cephalomedullary guide jig, a guide pin was drilled into the center position on both the AP and lateral giving us appropriate tip-to-apex distance of our lag screw. It was then measured. The guide wire was drilled over and our cephalomedullary screw was inserted. We then proceeded to place a distal interlock screw. Excellent purchase was obtained using the distal interlock screw. Next, all instruments were removed. We again took final fluoroscopic images and being satisfied with the construct, irrigated the wounds. Next, the wounds were closed in a layered fashion, and a sterile dressing was applied. The patient was safely transferred to hospital bed and awakened from the anesthetic and taken to the PACU for recovery. All instrument, needle and lap counts were correct in the case in accordance with hospital protocol    Postoperative Plan:  Debbie Boucher will be return to the hospital were postoperatively. They will be weight-bearing as tolerated and will receive a physical therapy regimen. The pain will be controlled with a combination of IV and oral pain medicine. DVT prophylaxis will be  Q day    Implants:  Natalie gamma 3 short nail measuring 170 mm in length with a 130 degree angle by 11 mm diameter. The cephalomedullary screw was 80 mm, and one distal  interlocking screw.
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
## 2023-09-26 NOTE — PLAN OF CARE
Goal Outcome Evaluation:      Plan of Care Reviewed With: patient        Labs/Protocols: hgb trending down post op - 7.5 - blood transfused x1  Vitals: /45   Pulse 61   Temp 98.2  F (36.8  C) (Temporal)   Resp 16   Wt 46.7 kg (102 lb 15.3 oz)   SpO2 99%   BMI 20.11 kg/m    Cardiac: hypotensive   Respiratory: bradypnea/shallow    Neuro: A/Ox4 - [place]  GI/: normoactive bowel sounds, BS for 359 @ 1700 - due to void; purewick in place   Skin: 3 incisions on right hip, 1 dressing - CDI; bruise L side, mepilex on bottom  LDAs: L forearm - transfusing blood  Diet: Regular - difficulty swallowing hard foods; NPO @ 0000 for R arm procedure in AM  Activity: 2 assist, GB/walker  Pain: controlled with scheduled tylenol   Plan: due to void, pain control, monitor hgb, progress diet tolerance, surgery tomorrow

## 2023-09-26 NOTE — ANESTHESIA CARE TRANSFER NOTE
Patient: Debbie Boucher    Procedure: Procedure(s):  Right intertrochanteric femur fracture open reduction internal fixation using short intramedullary nail       Diagnosis: Closed displaced intertrochanteric fracture of right femur, initial encounter (H) [S72.141A]  Diagnosis Additional Information: No value filed.    Anesthesia Type:   General     Note:    Oropharynx: oropharynx clear of all foreign objects  Level of Consciousness: awake  Oxygen Supplementation: face mask  Level of Supplemental Oxygen (L/min / FiO2): 6  Independent Airway: airway patency satisfactory and stable  Dentition: dentition unchanged  Vital Signs Stable: post-procedure vital signs reviewed and stable  Report to RN Given: handoff report given  Patient transferred to: PACU    Handoff Report: Identifed the Patient, Identified the Reponsible Provider, Reviewed the pertinent medical history, Discussed the surgical course, Reviewed Intra-OP anesthesia mangement and issues during anesthesia, Set expectations for post-procedure period and Allowed opportunity for questions and acknowledgement of understanding      Vitals:  Vitals Value Taken Time   BP     Temp     Pulse 61 09/26/23 1021   Resp 12 09/26/23 1021   SpO2 100 % 09/26/23 1021   Vitals shown include unvalidated device data.    Electronically Signed By: JAYCEE Jones CRNA  September 26, 2023  10:23 AM

## 2023-09-26 NOTE — PLAN OF CARE
Orthopedic Plan of Care    Patient is scheduled for a right distal radius fracture ORIF and closed treatment of an ulnar styloid fracture tomorrow afternoon (9/27/23) provided she remains medically optimized. Surgeon is Dr. Amber Kaur. Dr. Chucky Lane discussed plans for right wrist surgery with family following her right hip fracture repair earlier today. NPO at midnight. Continue to hold any anticoagulation until postop. Monitor hemoglobin and transfuse per protocol.     Seema Skinner PA-C  [facility]

## 2023-09-26 NOTE — PLAN OF CARE
Goal Outcome Evaluation:    Pertinent assessments: Assumed cares 3010-1732. Pt A&Ox4. C/o moderate-to-severe pain in R hip, and tolerable with IV Dilaudid 0.3 or 0.5mg x3 this shift. Denies SOB of lightheadedness. BP soft 106/37. Patient refusing to be moved, or let extra linens to be taken out from underneath d/t pain. However, agreed to move when will be getting prepped for surgery. NPO for surgery. CMS intact in all extremities including RLE. Voiding in purewick.

## 2023-09-26 NOTE — ANESTHESIA POSTPROCEDURE EVALUATION
Patient: Debbie Boucher    Procedure: Procedure(s):  Right intertrochanteric femur fracture open reduction internal fixation using short intramedullary nail       Anesthesia Type:  General    Note:     Postop Pain Control: Uneventful            Sign Out: Well controlled pain   PONV: No   Neuro/Psych: Uneventful            Sign Out: Acceptable/Baseline neuro status   Airway/Respiratory:             Sign Out: Acceptable/Baseline resp. status   CV/Hemodynamics:             Sign Out: Acceptable CV status   Other NRE:    DID A NON-ROUTINE EVENT OCCUR?            Last vitals:  Vitals Value Taken Time   /60 09/26/23 1210   Temp 98.1  F (36.7  C) 09/26/23 1020   Pulse 52 09/26/23 1223   Resp 10 09/26/23 1223   SpO2 96 % 09/26/23 1223   Vitals shown include unvalidated device data.    Electronically Signed By: Jimbo Marcum DO  September 26, 2023  12:24 PM
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
## 2023-09-26 NOTE — BRIEF OP NOTE
[facility]    Brief Operative Note    Pre-operative diagnosis: Closed displaced intertrochanteric fracture of right femur, initial encounter (H) [F72.489P]  Post-operative diagnosis Same as pre-operative diagnosis    Procedure: Procedure(s):  Right intertrochanteric femur fracture open reduction internal fixation using short intramedullary nail  Surgeon: Surgeon(s) and Role:     * Chucky Lane MD - Primary     * Adriane Lazo PA-C - Assisting  Anesthesia: General   Estimated Blood Loss: 100 mL from 9/26/2023  9:10 AM to 9/26/2023 10:15 AM      Drains: None  Specimens: * No specimens in log *  Findings:   None.  Complications: None.  Implants:   Implant Name Type Inv. Item Serial No.  Lot No. LRB No. Used Action   IMP WIRE ANSLEY [id number] 1210-6450S - [id number]  IMP WIRE ANSLEY [id number] 1210-6450S  [employer]  Right 1 Used as a Supply   IMP WIRE ANSLEY [id number] 1210-6450S - [id number]  IMP WIRE ANSLEY [id number] 1210-6450S  [employer]  Right 1 Used as a Supply   IMP NAIL STRK TROCH IM GAMMA 130DEG [id number] 3130-1180S - [id number] Metallic Hardware/[place] IMP NAIL STRK TROCH IM GAMMA 130DEG [id number] 3130-1180S  [employer] [id number] Right 1 Implanted   IMP SCR BONE STRK G3 LAG 10.5X85MM TI 3060-0085S - [id number] Metallic Hardware/[place] IMP SCR BONE STRK G3 LAG 10.5X85MM TI 3060-0085S  [employer] [id number] Right 1 Implanted   IMP SCR STRK LOCK 5.0X35MM FT 1896-5035S - [id number] Metallic Hardware/[place] IMP SCR STRK LOCK 5.0X35MM FT 1896-5035S  [employer] [id number] Right 1 Implanted

## 2023-09-27 ENCOUNTER — ANESTHESIA (OUTPATIENT)
Dept: SURGERY | Facility: CLINIC | Age: 78
DRG: 481 | End: 2023-09-27
Payer: MEDICARE

## 2023-09-27 ENCOUNTER — APPOINTMENT (OUTPATIENT)
Dept: GENERAL RADIOLOGY | Facility: CLINIC | Age: 78
DRG: 481 | End: 2023-09-27
Payer: MEDICARE

## 2023-09-27 ENCOUNTER — ANESTHESIA EVENT (OUTPATIENT)
Dept: SURGERY | Facility: CLINIC | Age: 78
DRG: 481 | End: 2023-09-27
Payer: MEDICARE

## 2023-09-27 LAB
ANION GAP SERPL CALCULATED.3IONS-SCNC: 8 MMOL/L (ref 7–15)
ATRIAL RATE - MUSE: 59 BPM
BUN SERPL-MCNC: 11.5 MG/DL (ref 8–23)
CALCIUM SERPL-MCNC: 8.3 MG/DL (ref 8.8–10.2)
CHLORIDE SERPL-SCNC: 105 MMOL/L (ref 98–107)
CREAT SERPL-MCNC: 0.67 MG/DL (ref 0.51–0.95)
DEPRECATED HCO3 PLAS-SCNC: 28 MMOL/L (ref 22–29)
DIASTOLIC BLOOD PRESSURE - MUSE: NORMAL MMHG
EGFRCR SERPLBLD CKD-EPI 2021: 90 ML/MIN/1.73M2
GLUCOSE SERPL-MCNC: 112 MG/DL (ref 70–99)
GLUCOSE SERPL-MCNC: 112 MG/DL (ref 70–99)
HGB BLD-MCNC: 8.3 G/DL (ref 11.7–15.7)
INTERPRETATION ECG - MUSE: NORMAL
P AXIS - MUSE: 66 DEGREES
POTASSIUM SERPL-SCNC: 4.2 MMOL/L (ref 3.4–5.3)
PR INTERVAL - MUSE: 140 MS
QRS DURATION - MUSE: 114 MS
QT - MUSE: 472 MS
QTC - MUSE: 467 MS
R AXIS - MUSE: 66 DEGREES
SODIUM SERPL-SCNC: 141 MMOL/L (ref 135–145)
SYSTOLIC BLOOD PRESSURE - MUSE: NORMAL MMHG
T AXIS - MUSE: 29 DEGREES
VENTRICULAR RATE- MUSE: 59 BPM

## 2023-09-27 PROCEDURE — 999N000179 XR SURGERY CARM FLUORO LESS THAN 5 MIN W STILLS: Mod: TC

## 2023-09-27 PROCEDURE — 250N000011 HC RX IP 250 OP 636: Mod: JZ | Performed by: ORTHOPAEDIC SURGERY

## 2023-09-27 PROCEDURE — C1713 ANCHOR/SCREW BN/BN,TIS/BN: HCPCS | Performed by: ORTHOPAEDIC SURGERY

## 2023-09-27 PROCEDURE — 80048 BASIC METABOLIC PNL TOTAL CA: CPT | Performed by: INTERNAL MEDICINE

## 2023-09-27 PROCEDURE — 258N000003 HC RX IP 258 OP 636: Performed by: ORTHOPAEDIC SURGERY

## 2023-09-27 PROCEDURE — 120N000001 HC R&B MED SURG/OB

## 2023-09-27 PROCEDURE — 85018 HEMOGLOBIN: CPT

## 2023-09-27 PROCEDURE — 36415 COLL VENOUS BLD VENIPUNCTURE: CPT | Performed by: INTERNAL MEDICINE

## 2023-09-27 PROCEDURE — 272N000001 HC OR GENERAL SUPPLY STERILE: Performed by: ORTHOPAEDIC SURGERY

## 2023-09-27 PROCEDURE — 250N000011 HC RX IP 250 OP 636: Mod: JZ

## 2023-09-27 PROCEDURE — 250N000011 HC RX IP 250 OP 636: Performed by: PHYSICIAN ASSISTANT

## 2023-09-27 PROCEDURE — 250N000013 HC RX MED GY IP 250 OP 250 PS 637: Performed by: ORTHOPAEDIC SURGERY

## 2023-09-27 PROCEDURE — 258N000003 HC RX IP 258 OP 636: Performed by: NURSE ANESTHETIST, CERTIFIED REGISTERED

## 2023-09-27 PROCEDURE — C1769 GUIDE WIRE: HCPCS | Performed by: ORTHOPAEDIC SURGERY

## 2023-09-27 PROCEDURE — 360N000084 HC SURGERY LEVEL 4 W/ FLUORO, PER MIN: Performed by: ORTHOPAEDIC SURGERY

## 2023-09-27 PROCEDURE — 250N000011 HC RX IP 250 OP 636: Mod: JZ | Performed by: NURSE ANESTHETIST, CERTIFIED REGISTERED

## 2023-09-27 PROCEDURE — 0PSH06Z REPOSITION RIGHT RADIUS WITH INTRAMEDULLARY INTERNAL FIXATION DEVICE, OPEN APPROACH: ICD-10-PCS | Performed by: ORTHOPAEDIC SURGERY

## 2023-09-27 PROCEDURE — 250N000013 HC RX MED GY IP 250 OP 250 PS 637: Performed by: INTERNAL MEDICINE

## 2023-09-27 PROCEDURE — 250N000009 HC RX 250: Performed by: NURSE ANESTHETIST, CERTIFIED REGISTERED

## 2023-09-27 PROCEDURE — 710N000012 HC RECOVERY PHASE 2, PER MINUTE: Performed by: ORTHOPAEDIC SURGERY

## 2023-09-27 PROCEDURE — 999N000141 HC STATISTIC PRE-PROCEDURE NURSING ASSESSMENT: Performed by: ORTHOPAEDIC SURGERY

## 2023-09-27 PROCEDURE — 99232 SBSQ HOSP IP/OBS MODERATE 35: CPT | Performed by: INTERNAL MEDICINE

## 2023-09-27 PROCEDURE — 250N000013 HC RX MED GY IP 250 OP 250 PS 637

## 2023-09-27 PROCEDURE — 250N000013 HC RX MED GY IP 250 OP 250 PS 637: Performed by: PHYSICIAN ASSISTANT

## 2023-09-27 PROCEDURE — 250N000011 HC RX IP 250 OP 636: Performed by: ANESTHESIOLOGY

## 2023-09-27 PROCEDURE — 370N000017 HC ANESTHESIA TECHNICAL FEE, PER MIN: Performed by: ORTHOPAEDIC SURGERY

## 2023-09-27 DEVICE — IMPLANTABLE DEVICE: Type: IMPLANTABLE DEVICE | Site: WRIST | Status: FUNCTIONAL

## 2023-09-27 DEVICE — IMP PEG FX 2.3X16MM CORT SMTH: Type: IMPLANTABLE DEVICE | Site: WRIST | Status: FUNCTIONAL

## 2023-09-27 RX ORDER — OXYCODONE HYDROCHLORIDE 5 MG/1
5 TABLET ORAL
Status: DISCONTINUED | OUTPATIENT
Start: 2023-09-27 | End: 2023-09-27 | Stop reason: HOSPADM

## 2023-09-27 RX ORDER — ONDANSETRON 4 MG/1
4 TABLET, ORALLY DISINTEGRATING ORAL EVERY 30 MIN PRN
Status: DISCONTINUED | OUTPATIENT
Start: 2023-09-27 | End: 2023-09-27 | Stop reason: HOSPADM

## 2023-09-27 RX ORDER — SODIUM CHLORIDE, SODIUM LACTATE, POTASSIUM CHLORIDE, CALCIUM CHLORIDE 600; 310; 30; 20 MG/100ML; MG/100ML; MG/100ML; MG/100ML
INJECTION, SOLUTION INTRAVENOUS CONTINUOUS
Status: DISCONTINUED | OUTPATIENT
Start: 2023-09-27 | End: 2023-09-27 | Stop reason: HOSPADM

## 2023-09-27 RX ORDER — CEFAZOLIN SODIUM/WATER 2 G/20 ML
2 SYRINGE (ML) INTRAVENOUS SEE ADMIN INSTRUCTIONS
Status: DISCONTINUED | OUTPATIENT
Start: 2023-09-27 | End: 2023-09-27 | Stop reason: HOSPADM

## 2023-09-27 RX ORDER — LIDOCAINE HYDROCHLORIDE 20 MG/ML
INJECTION, SOLUTION INFILTRATION; PERINEURAL PRN
Status: DISCONTINUED | OUTPATIENT
Start: 2023-09-27 | End: 2023-09-27

## 2023-09-27 RX ORDER — FENTANYL CITRATE 50 UG/ML
25 INJECTION, SOLUTION INTRAMUSCULAR; INTRAVENOUS EVERY 5 MIN PRN
Status: DISCONTINUED | OUTPATIENT
Start: 2023-09-27 | End: 2023-09-27 | Stop reason: HOSPADM

## 2023-09-27 RX ORDER — PROPOFOL 10 MG/ML
INJECTION, EMULSION INTRAVENOUS CONTINUOUS PRN
Status: DISCONTINUED | OUTPATIENT
Start: 2023-09-27 | End: 2023-09-27

## 2023-09-27 RX ORDER — CEFAZOLIN SODIUM/WATER 2 G/20 ML
2 SYRINGE (ML) INTRAVENOUS
Status: COMPLETED | OUTPATIENT
Start: 2023-09-27 | End: 2023-09-27

## 2023-09-27 RX ORDER — ONDANSETRON 2 MG/ML
INJECTION INTRAMUSCULAR; INTRAVENOUS PRN
Status: DISCONTINUED | OUTPATIENT
Start: 2023-09-27 | End: 2023-09-27

## 2023-09-27 RX ORDER — HYDROMORPHONE HCL IN WATER/PF 6 MG/30 ML
0.4 PATIENT CONTROLLED ANALGESIA SYRINGE INTRAVENOUS EVERY 5 MIN PRN
Status: DISCONTINUED | OUTPATIENT
Start: 2023-09-27 | End: 2023-09-27 | Stop reason: HOSPADM

## 2023-09-27 RX ORDER — ACETAMINOPHEN 325 MG/1
975 TABLET ORAL EVERY 8 HOURS
Status: COMPLETED | OUTPATIENT
Start: 2023-09-28 | End: 2023-09-29

## 2023-09-27 RX ORDER — OXYCODONE HYDROCHLORIDE 5 MG/1
10 TABLET ORAL
Status: DISCONTINUED | OUTPATIENT
Start: 2023-09-27 | End: 2023-09-27 | Stop reason: HOSPADM

## 2023-09-27 RX ORDER — LIDOCAINE 40 MG/G
CREAM TOPICAL
Status: DISCONTINUED | OUTPATIENT
Start: 2023-09-27 | End: 2023-09-27 | Stop reason: HOSPADM

## 2023-09-27 RX ORDER — LORAZEPAM 2 MG/ML
0.5 INJECTION INTRAMUSCULAR
Status: DISCONTINUED | OUTPATIENT
Start: 2023-09-27 | End: 2023-10-01 | Stop reason: HOSPADM

## 2023-09-27 RX ORDER — ONDANSETRON 2 MG/ML
4 INJECTION INTRAMUSCULAR; INTRAVENOUS EVERY 30 MIN PRN
Status: DISCONTINUED | OUTPATIENT
Start: 2023-09-27 | End: 2023-09-27 | Stop reason: HOSPADM

## 2023-09-27 RX ORDER — HYDROMORPHONE HCL IN WATER/PF 6 MG/30 ML
0.2 PATIENT CONTROLLED ANALGESIA SYRINGE INTRAVENOUS EVERY 5 MIN PRN
Status: DISCONTINUED | OUTPATIENT
Start: 2023-09-27 | End: 2023-09-27 | Stop reason: HOSPADM

## 2023-09-27 RX ORDER — FENTANYL CITRATE 50 UG/ML
50 INJECTION, SOLUTION INTRAMUSCULAR; INTRAVENOUS EVERY 5 MIN PRN
Status: DISCONTINUED | OUTPATIENT
Start: 2023-09-27 | End: 2023-09-27 | Stop reason: HOSPADM

## 2023-09-27 RX ORDER — SODIUM CHLORIDE, SODIUM LACTATE, POTASSIUM CHLORIDE, CALCIUM CHLORIDE 600; 310; 30; 20 MG/100ML; MG/100ML; MG/100ML; MG/100ML
INJECTION, SOLUTION INTRAVENOUS CONTINUOUS PRN
Status: DISCONTINUED | OUTPATIENT
Start: 2023-09-27 | End: 2023-09-27

## 2023-09-27 RX ORDER — FENTANYL CITRATE 50 UG/ML
INJECTION, SOLUTION INTRAMUSCULAR; INTRAVENOUS PRN
Status: DISCONTINUED | OUTPATIENT
Start: 2023-09-27 | End: 2023-09-27

## 2023-09-27 RX ORDER — HALOPERIDOL 5 MG/ML
2 INJECTION INTRAMUSCULAR
Status: DISCONTINUED | OUTPATIENT
Start: 2023-09-27 | End: 2023-10-01 | Stop reason: HOSPADM

## 2023-09-27 RX ADMIN — FAMOTIDINE 20 MG: 20 TABLET ORAL at 20:44

## 2023-09-27 RX ADMIN — LIDOCAINE HYDROCHLORIDE 20 MG: 20 INJECTION, SOLUTION INFILTRATION; PERINEURAL at 13:18

## 2023-09-27 RX ADMIN — Medication 2.5 MG: at 08:23

## 2023-09-27 RX ADMIN — SODIUM CHLORIDE, POTASSIUM CHLORIDE, SODIUM LACTATE AND CALCIUM CHLORIDE: 600; 310; 30; 20 INJECTION, SOLUTION INTRAVENOUS at 11:28

## 2023-09-27 RX ADMIN — FOLIC ACID 1 MG: 1 TABLET ORAL at 18:21

## 2023-09-27 RX ADMIN — HYDROMORPHONE HYDROCHLORIDE 0.2 MG: 0.2 INJECTION, SOLUTION INTRAMUSCULAR; INTRAVENOUS; SUBCUTANEOUS at 17:09

## 2023-09-27 RX ADMIN — TRAZODONE HYDROCHLORIDE 100 MG: 100 TABLET ORAL at 21:28

## 2023-09-27 RX ADMIN — Medication 2.5 MG: at 21:28

## 2023-09-27 RX ADMIN — SENNOSIDES AND DOCUSATE SODIUM 1 TABLET: 8.6; 5 TABLET ORAL at 20:44

## 2023-09-27 RX ADMIN — HYDROMORPHONE HYDROCHLORIDE 0.2 MG: 0.2 INJECTION, SOLUTION INTRAMUSCULAR; INTRAVENOUS; SUBCUTANEOUS at 05:17

## 2023-09-27 RX ADMIN — LEVOTHYROXINE SODIUM 75 MCG: 0.07 TABLET ORAL at 18:21

## 2023-09-27 RX ADMIN — Medication 2 G: at 13:07

## 2023-09-27 RX ADMIN — MIDAZOLAM 2 MG: 1 INJECTION INTRAMUSCULAR; INTRAVENOUS at 11:25

## 2023-09-27 RX ADMIN — ACETAMINOPHEN 975 MG: 325 TABLET, FILM COATED ORAL at 16:55

## 2023-09-27 RX ADMIN — PANTOPRAZOLE SODIUM 40 MG: 40 TABLET, DELAYED RELEASE ORAL at 18:19

## 2023-09-27 RX ADMIN — FENTANYL CITRATE 50 MCG: 50 INJECTION, SOLUTION INTRAMUSCULAR; INTRAVENOUS at 13:12

## 2023-09-27 RX ADMIN — PROPOFOL 40 MCG/KG/MIN: 10 INJECTION, EMULSION INTRAVENOUS at 13:27

## 2023-09-27 RX ADMIN — HYDROXYZINE HYDROCHLORIDE 25 MG: 25 TABLET, FILM COATED ORAL at 04:15

## 2023-09-27 RX ADMIN — SODIUM CHLORIDE, POTASSIUM CHLORIDE, SODIUM LACTATE AND CALCIUM CHLORIDE: 600; 310; 30; 20 INJECTION, SOLUTION INTRAVENOUS at 15:29

## 2023-09-27 RX ADMIN — QUETIAPINE FUMARATE 12.5 MG: 25 TABLET ORAL at 16:56

## 2023-09-27 RX ADMIN — ASPIRIN 325 MG: 325 TABLET, COATED ORAL at 18:22

## 2023-09-27 RX ADMIN — Medication 2.5 MG: at 04:15

## 2023-09-27 RX ADMIN — Medication 50 MCG: at 18:19

## 2023-09-27 RX ADMIN — BUPROPION HYDROCHLORIDE 150 MG: 150 TABLET, EXTENDED RELEASE ORAL at 18:21

## 2023-09-27 RX ADMIN — ONDANSETRON 4 MG: 2 INJECTION INTRAMUSCULAR; INTRAVENOUS at 13:18

## 2023-09-27 RX ADMIN — FENTANYL CITRATE 50 MCG: 50 INJECTION, SOLUTION INTRAMUSCULAR; INTRAVENOUS at 13:15

## 2023-09-27 RX ADMIN — Medication 100 MCG: at 18:21

## 2023-09-27 RX ADMIN — ACETAMINOPHEN 975 MG: 325 TABLET, FILM COATED ORAL at 05:18

## 2023-09-27 ASSESSMENT — ACTIVITIES OF DAILY LIVING (ADL)
ADLS_ACUITY_SCORE: 38
ADLS_ACUITY_SCORE: 42
ADLS_ACUITY_SCORE: 38
ADLS_ACUITY_SCORE: 42
ADLS_ACUITY_SCORE: 40
ADLS_ACUITY_SCORE: 42
ADLS_ACUITY_SCORE: 38
ADLS_ACUITY_SCORE: 38
ADLS_ACUITY_SCORE: 40
ADLS_ACUITY_SCORE: 38

## 2023-09-27 NOTE — BRIEF OP NOTE
[facility]    Brief Operative Note    Pre-operative diagnosis: Closed fracture of distal end of right radius, unspecified fracture morphology, initial encounter [S51.095P]  Post-operative diagnosis Same as pre-operative diagnosis    Procedure: Right distal radius fracture open reduction internal fixation, closed treatment of an ulnar styloid fracture, Right - Wrist    Surgeon: Surgeon(s) and Role:     * Amber Kaur MD - Primary  Anesthesia: Choice with Block   Estimated Blood Loss: 5 cc    Drains: None  Specimens: * No specimens in log *  Findings:   None.  Complications: None.  Implants:   Implant Name Type Inv. Item Serial No.  Lot No. LRB No. Used Action   IMP PEG FX 2.3X16MM JUSTINO SMTH - SN/A Metallic Hardware/[place] IMP PEG FX 2.3X16MM [employer] N/A [employer] [id number] Right 5 Implanted   IMP SCR BN 2.3X20MM TI NON - [id number] Metallic Hardware/[place] IMP SCR BN 2.3X20MM TI NON  [employer] [id number] Right 1 Wasted   IMP PLATE ACUMED VDR ACULOC 2 RT NARROW  - [id number] Metallic Hardware/[place] IMP PLATE ACUMED VDR ACULOC 2 RT NARROW   [employer] [id number] Right 1 Implanted   3.5mm non-locking hexalobe screws   N/A  [id number] Right 2 Implanted   3.5mm non-locking hexalobe screws   N/A  [id number] Right 1 Implanted       Return to inpatient floor.  Splint to remain clean, dry, and intact  Elevate and ice  Sling x 24 hours until block wears off - necessary when up out of bed.  May weight bear through right forearm.  F/U in 1 week as outpatient for cast.  May be able to do cast at TCU.

## 2023-09-27 NOTE — PLAN OF CARE
Goal Outcome Evaluation:       Patient vital signs are at baseline: Yes  Patient able to ambulate as they were prior to admission or with assist devices provided by therapies during their stay:  No,  Reason:  up A2 GB to BSC  Patient MUST void prior to discharge:  Yes PVR noted see flowsheet  Patient able to tolerate oral intake:  Yes but pt has been NPO since Midnight  Pain has adequate pain control using Oral analgesics:  Yes pain 7/10 prn Oxycodone given then pain rated 5/10.  Does patient have an identified :  Yes daughter at bedside  Has goal D/C date and time been discussed with patient:  No,  Reason:  PT is having surgery on RUE at 1150. Report given to preop RN. Surgical bathe and wipes done.
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
## 2023-09-27 NOTE — PLAN OF CARE
Goal Outcome Evaluation:    Patient vital signs are at baseline: Yes  Patient able to ambulate as they were prior to admission or with assist devices provided by therapies during their stay:  Yes  Patient MUST void prior to discharge:  Yes  Patient able to tolerate oral intake:  Yes  Pain has adequate pain control using Oral analgesics:  Yes  Does patient have an identified :  Yes  Has goal D/C date and time been discussed with patient:  No,  Reason:  Pending.    Patient A&O, up with assist of 2. On room air. PIV running LR @ 100ml/hr. Ancef given as ordered. NPO as midnight for surgery reinforced to patient. Voids spontaneously. 1 pack RBC given with no issues.  Hip dressing C/D/I. WBAT. Pain managed by scheduled tylenol and PRN atarax, oxycodone and dilaudid. Surgical bath provided. Will continue to provide cares as needed.

## 2023-09-27 NOTE — ANESTHESIA CARE TRANSFER NOTE
Patient: Debbie Boucher    Procedure: Procedure(s):  Right distal radius fracture open reduction internal fixation, closed treatment of an ulnar styloid fracture       Diagnosis: Closed fracture of distal end of right radius, unspecified fracture morphology, initial encounter [S52.501A]  Diagnosis Additional Information: No value filed.    Anesthesia Type:   Peripheral Nerve Block     Note:    Oropharynx: oropharynx clear of all foreign objects  Level of Consciousness: awake  Oxygen Supplementation: room air    Independent Airway: airway patency satisfactory and stable  Dentition: dentition unchanged  Vital Signs Stable: post-procedure vital signs reviewed and stable  Report to RN Given: handoff report given  Patient transferred to: PACU  Comments: Confused as at baseline, arm is pain free, VSS  Handoff Report: Identifed the Patient, Identified the Reponsible Provider, Reviewed the pertinent medical history, Discussed the surgical course, Reviewed Intra-OP anesthesia mangement and issues during anesthesia and Allowed opportunity for questions and acknowledgement of understanding      Vitals:  Vitals Value Taken Time   BP     Temp     Pulse     Resp     SpO2         Electronically Signed By: JAYCEE Alfaro CRNA  September 27, 2023  2:24 PM

## 2023-09-27 NOTE — ANESTHESIA PROCEDURE NOTES
Brachial plexus Procedure Note    Pre-Procedure   Staff -        Anesthesiologist:  Jimbo Marcum DO       Performed By: anesthesiologist       Referred By: MD Natasha       Location: pre-op       Procedure Start/Stop Times: 9/27/2023 11:26 AM and 9/27/2023 11:34 AM       Pre-Anesthestic Checklist: patient identified, IV checked, site marked, risks and benefits discussed, informed consent, monitors and equipment checked, pre-op evaluation, at physician/surgeon's request and post-op pain management  Timeout:       Correct Patient: Yes        Correct Procedure: Yes        Correct Site: Yes        Correct Position: Yes        Correct Laterality: Yes        Site Marked: Yes  Procedure Documentation  Procedure: Brachial plexus       Laterality: right       Patient Position: supine       Skin prep: Chloraprep       Local skin infiltrated with mL of 1% lidocaine.  (axillary approach).       Needle Type: insulated       Needle Length (Inches): 2        Ultrasound guided       1. Ultrasound was used to identify targeted nerve, plexus, vascular marker, or fascial plane and place a needle adjacent to it in real-time.       2. Ultrasound was used to visualize the spread of anesthetic in close proximity to the above referenced structure.       Nerve Stim: Initial Level 0.6 mA.  Lowest motor response mA.    Assessment/Narrative         The placement was negative for: blood aspirated, painful injection and site bleeding       Paresthesias: Resolved.       Bolus given via needle. no blood aspirated via catheter.        Secured via.        Insertion/Infusion Method: Single Shot       Complications: none    Medication(s) Administered   Medication Administration Time: 9/27/2023 11:26 AM     Comments:  .Brachial Plexus Block, axillary approach    axillary Approach    Medication Bupivicaine 0.75% 10cc + Lido 2% w/ epi 1:200k sid Marcum        FOR [facility] ([facility] ONLY:   Pain Team Contact information: please page the  "Pain Team Via [facility]. Search \"Pain\". During daytime hours, please page the attending first. At night please page the resident first.      "
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
## 2023-09-28 ENCOUNTER — APPOINTMENT (OUTPATIENT)
Dept: PHYSICAL THERAPY | Facility: CLINIC | Age: 78
DRG: 481 | End: 2023-09-28
Attending: INTERNAL MEDICINE
Payer: MEDICARE

## 2023-09-28 LAB
ALBUMIN UR-MCNC: NEGATIVE MG/DL
ANION GAP SERPL CALCULATED.3IONS-SCNC: 5 MMOL/L (ref 7–15)
APPEARANCE UR: CLEAR
BILIRUB UR QL STRIP: NEGATIVE
BUN SERPL-MCNC: 7.2 MG/DL (ref 8–23)
CALCIUM SERPL-MCNC: 8 MG/DL (ref 8.8–10.2)
CHLORIDE SERPL-SCNC: 106 MMOL/L (ref 98–107)
COLOR UR AUTO: ABNORMAL
CREAT SERPL-MCNC: 0.65 MG/DL (ref 0.51–0.95)
EGFRCR SERPLBLD CKD-EPI 2021: 90 ML/MIN/1.73M2
ERYTHROCYTE [DISTWIDTH] IN BLOOD BY AUTOMATED COUNT: 13.4 % (ref 10–15)
GLUCOSE SERPL-MCNC: 96 MG/DL (ref 70–99)
GLUCOSE UR STRIP-MCNC: NEGATIVE MG/DL
HCO3 SERPL-SCNC: 31 MMOL/L (ref 22–29)
HCT VFR BLD AUTO: 21.1 % (ref 35–47)
HGB BLD-MCNC: 7 G/DL (ref 11.7–15.7)
HGB BLD-MCNC: 7.4 G/DL (ref 11.7–15.7)
HGB UR QL STRIP: NEGATIVE
KETONES UR STRIP-MCNC: NEGATIVE MG/DL
LEUKOCYTE ESTERASE UR QL STRIP: NEGATIVE
MCH RBC QN AUTO: 33.8 PG (ref 26.5–33)
MCHC RBC AUTO-ENTMCNC: 33.2 G/DL (ref 31.5–36.5)
MCV RBC AUTO: 102 FL (ref 78–100)
MUCOUS THREADS #/AREA URNS LPF: PRESENT /LPF
NITRATE UR QL: NEGATIVE
PH UR STRIP: 5.5 [PH] (ref 5–7)
PLATELET # BLD AUTO: 114 10E3/UL (ref 150–450)
POTASSIUM SERPL-SCNC: 3.9 MMOL/L (ref 3.4–5.3)
RBC # BLD AUTO: 2.07 10E6/UL (ref 3.8–5.2)
RBC URINE: <1 /HPF
SODIUM SERPL-SCNC: 142 MMOL/L (ref 135–145)
SP GR UR STRIP: 1.01 (ref 1–1.03)
SQUAMOUS EPITHELIAL: <1 /HPF
UROBILINOGEN UR STRIP-MCNC: NORMAL MG/DL
WBC # BLD AUTO: 5.3 10E3/UL (ref 4–11)
WBC URINE: 1 /HPF

## 2023-09-28 PROCEDURE — 97530 THERAPEUTIC ACTIVITIES: CPT | Mod: GP | Performed by: PHYSICAL THERAPIST

## 2023-09-28 PROCEDURE — 80048 BASIC METABOLIC PNL TOTAL CA: CPT | Performed by: INTERNAL MEDICINE

## 2023-09-28 PROCEDURE — 85027 COMPLETE CBC AUTOMATED: CPT | Performed by: INTERNAL MEDICINE

## 2023-09-28 PROCEDURE — 250N000013 HC RX MED GY IP 250 OP 250 PS 637: Performed by: INTERNAL MEDICINE

## 2023-09-28 PROCEDURE — 250N000011 HC RX IP 250 OP 636: Mod: JZ | Performed by: ORTHOPAEDIC SURGERY

## 2023-09-28 PROCEDURE — 250N000013 HC RX MED GY IP 250 OP 250 PS 637: Performed by: ORTHOPAEDIC SURGERY

## 2023-09-28 PROCEDURE — 36415 COLL VENOUS BLD VENIPUNCTURE: CPT | Performed by: INTERNAL MEDICINE

## 2023-09-28 PROCEDURE — 97116 GAIT TRAINING THERAPY: CPT | Mod: GP | Performed by: PHYSICAL THERAPIST

## 2023-09-28 PROCEDURE — 120N000001 HC R&B MED SURG/OB

## 2023-09-28 PROCEDURE — 258N000003 HC RX IP 258 OP 636: Performed by: ORTHOPAEDIC SURGERY

## 2023-09-28 PROCEDURE — 85018 HEMOGLOBIN: CPT | Performed by: ORTHOPAEDIC SURGERY

## 2023-09-28 PROCEDURE — 81001 URINALYSIS AUTO W/SCOPE: CPT | Performed by: INTERNAL MEDICINE

## 2023-09-28 PROCEDURE — 99207 PR NO CHARGE LOS: CPT | Performed by: INTERNAL MEDICINE

## 2023-09-28 PROCEDURE — 99232 SBSQ HOSP IP/OBS MODERATE 35: CPT | Performed by: INTERNAL MEDICINE

## 2023-09-28 PROCEDURE — 36415 COLL VENOUS BLD VENIPUNCTURE: CPT | Performed by: ORTHOPAEDIC SURGERY

## 2023-09-28 PROCEDURE — 97161 PT EVAL LOW COMPLEX 20 MIN: CPT | Mod: GP | Performed by: PHYSICAL THERAPIST

## 2023-09-28 RX ADMIN — PANTOPRAZOLE SODIUM 40 MG: 40 TABLET, DELAYED RELEASE ORAL at 08:23

## 2023-09-28 RX ADMIN — OXYCODONE HYDROCHLORIDE 5 MG: 5 TABLET ORAL at 23:49

## 2023-09-28 RX ADMIN — Medication 50 MCG: at 08:26

## 2023-09-28 RX ADMIN — BUPROPION HYDROCHLORIDE 150 MG: 150 TABLET, EXTENDED RELEASE ORAL at 08:23

## 2023-09-28 RX ADMIN — ACETAMINOPHEN 975 MG: 325 TABLET, FILM COATED ORAL at 08:26

## 2023-09-28 RX ADMIN — SENNOSIDES AND DOCUSATE SODIUM 1 TABLET: 8.6; 5 TABLET ORAL at 19:59

## 2023-09-28 RX ADMIN — FAMOTIDINE 20 MG: 20 TABLET ORAL at 19:59

## 2023-09-28 RX ADMIN — FAMOTIDINE 20 MG: 20 TABLET ORAL at 08:25

## 2023-09-28 RX ADMIN — ASPIRIN 325 MG: 325 TABLET, COATED ORAL at 08:23

## 2023-09-28 RX ADMIN — ACETAMINOPHEN 975 MG: 325 TABLET, FILM COATED ORAL at 02:08

## 2023-09-28 RX ADMIN — SODIUM CHLORIDE, POTASSIUM CHLORIDE, SODIUM LACTATE AND CALCIUM CHLORIDE: 600; 310; 30; 20 INJECTION, SOLUTION INTRAVENOUS at 02:14

## 2023-09-28 RX ADMIN — LEVOTHYROXINE SODIUM 75 MCG: 0.07 TABLET ORAL at 08:23

## 2023-09-28 RX ADMIN — HYDROMORPHONE HYDROCHLORIDE 0.2 MG: 0.2 INJECTION, SOLUTION INTRAMUSCULAR; INTRAVENOUS; SUBCUTANEOUS at 02:10

## 2023-09-28 RX ADMIN — TRAZODONE HYDROCHLORIDE 100 MG: 100 TABLET ORAL at 21:48

## 2023-09-28 RX ADMIN — FOLIC ACID 1 MG: 1 TABLET ORAL at 08:25

## 2023-09-28 RX ADMIN — Medication 100 MCG: at 08:26

## 2023-09-28 RX ADMIN — ACETAMINOPHEN 975 MG: 325 TABLET, FILM COATED ORAL at 17:29

## 2023-09-28 RX ADMIN — FLUOXETINE 40 MG: 20 CAPSULE ORAL at 08:23

## 2023-09-28 RX ADMIN — OXYCODONE HYDROCHLORIDE 5 MG: 5 TABLET ORAL at 08:42

## 2023-09-28 ASSESSMENT — ACTIVITIES OF DAILY LIVING (ADL)
ADLS_ACUITY_SCORE: 38
ADLS_ACUITY_SCORE: 42
ADLS_ACUITY_SCORE: 38
ADLS_ACUITY_SCORE: 42
DEPENDENT_IADLS:: INDEPENDENT
ADLS_ACUITY_SCORE: 38
ADLS_ACUITY_SCORE: 42
ADLS_ACUITY_SCORE: 42
ADLS_ACUITY_SCORE: 38

## 2023-09-28 NOTE — CONSULTS
Care Management Initial Consult    General Information  Assessment completed with: Patient,    Type of CM/SW Visit: Initial Assessment    Primary Care Provider verified and updated as needed:     Readmission within the last 30 days:        Reason for Consult: discharge planning  Advance Care Planning:       General Information Comments: Lives alone    Communication Assessment  Patient's communication style: spoken language (English or Bilingual)    Hearing Difficulty or Deaf: yes   Wear Glasses or Blind: yes    Cognitive  Cognitive/Neuro/Behavioral: WDL  Level of Consciousness: alert  Arousal Level: opens eyes spontaneously  Orientation: oriented x 4  Mood/Behavior: calm, cooperative  Best Language: 0 - No aphasia  Speech: clear, spontaneous    Living Environment:   People in home: alone     Current living Arrangements: condominium      Able to return to prior arrangements: no  Living Arrangement Comments: one level condo    Family/Social Support:  Care provided by: self  Provides care for: no one     Children          Description of Support System: Supportive, Involved    Support Assessment: Adequate family and caregiver support, Adequate social supports    Current Resources:   Patient receiving home care services: No     Community Resources: Meals on Wheels  Equipment currently used at home: walker, rolling  Supplies currently used at home: None    Employment/Financial:  Employment Status: retired        Financial Concerns: No concerns identified           Does the patient's insurance plan have a 3 day qualifying hospital stay waiver?  No    Lifestyle & Psychosocial Needs:  Social Determinants of Health     Food Insecurity: Not on file   Depression: Not at risk (8/7/2023)    PHQ-2     PHQ-2 Score: 1   Housing Stability: Not on file   Tobacco Use: Low Risk  (9/27/2023)    Patient History     Smoking Tobacco Use: Never     Smokeless Tobacco Use: Never     Passive Exposure: Not on file   Financial Resource Strain:  Not on file   Alcohol Use: Not on file   Transportation Needs: Not on file   Physical Activity: Not on file   Interpersonal Safety: Not on file   Stress: Not on file   Social Connections: Not on file       Functional Status:  Prior to admission patient needed assistance:   Dependent ADLs:: Independent  Dependent IADLs:: Independent  Assesssment of Functional Status: Not at baseline with ADL Functioning, Not at baseline with mobility, Not at  functional baseline    Mental Health Status:  Mental Health Status: No Current Concerns       Chemical Dependency Status:  Chemical Dependency Status: No Current Concerns             Values/Beliefs:  Spiritual, Cultural Beliefs, Anabaptism Practices, Values that affect care:                 Additional Information:  Met with patient. She was alert and oriented,she is able to verbalize her thoughts and needs appropriately and is able to make her own decisions but she currently is deferring to her daughter Madelyn to make decisions regarding TCU placement.    Discussed recommendations from therapy for TCU. She reported that she has gone to TCU in the past and would like me to talk with her daughter, madelyn about places.    Spoke with daughterMadelyn she would like referrals to [facility] where she has been before and o [place]. These referrals will be made.    Plan: TCU placement.    ABENA White   Inpatient Care Coordination   Supervisor  [facility]  [phone]    ABENA Moss

## 2023-09-28 NOTE — PLAN OF CARE
Goal Outcome Evaluation:    Patient is alert and oriented x4, vital sign stable, on room air, Patient up with assist of x2. Pt takes pills whole with water. For pain control Iv dilaudid and tylenol given and was effective,Pure wick in placed for the night, voided but unable to collect urine simple, still waiting to collect urine for a UA. Patient sleeping between care.Will continue to provide supportive care.
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
## 2023-09-28 NOTE — PLAN OF CARE
/59  Pulse 68   Temp 97.1  F  Resp 20  SpO2 92%      Patient is alert and oriented x4, had pain and oxycodone was given and was effective. Patient is assist of 2. Takes pills whole with water. Has a pure wick in and still waiting to collect urine for a UA. Patient BS showed 143 mL. Patient states she has no urge to go yet. Family was here with patient.

## 2023-09-28 NOTE — PHARMACY-CONSULT NOTE
9/28 Delirium Consult with Recommendation    Medications evaluated as requested per Delirium Consult.    The following medication changes are recommended: Discontinue hydroxyzine and Methocarbamol since both are high risk meds for Hospital -acquired Delirium    I will notify Provider of recommendation    Herb Pharm D.

## 2023-09-28 NOTE — PLAN OF CARE
Goal Outcome Evaluation:      Plan of Care Reviewed With: patient (daughter and son)      Patient vital signs are at baseline: Yes afebrile RA. Pt would not let us keep CAPNO on first few hours  Patient able to ambulate as they were prior to admission or with assist devices provided by therapies during their stay:  No,  Reason:  Not OOB yet  Patient MUST void prior to discharge:  No,  Reason:  DTV. Will get UA sample to send when she voids  Patient able to tolerate oral intake:  Yes  Pain has adequate pain control using Oral analgesics:  Yes pain down to 5/10 after IV Dilaudid and prn Seroquel.  Does patient have an identified :  Yes Son and Daughter at bedside  Has goal D/C date and time been discussed with patient:  No,  Reason:  TBD. Pt to assess pt tomorrow.    Pt arrived from PACU at 1505. Pt was confused and became very agitated, combative. Pulled part of RUE wrap and dressing from cast splint. She kicked and was Biting when attempted mitt. MD paged and delirium orders placed. Pt would not take Seroquel and tylenol until 1656. Cross cover MD came to see pt and placed prn one time Ativan and Haldol if needed now. EBL 5 ml. Pt had nerve block she can feel touching her fingers but has nb/t still. Edema and bruising to R fingers. Elevated on pillow. ASA EC continued. Dressing to RLE C/D/I. Pt refused CAPNO still at 1900 but she is calm and A&O X3.

## 2023-09-28 NOTE — PLAN OF CARE
Occupational Therapy: Orders received. Chart reviewed and discussed with care team.? Acute care Occupational Therapy not indicated. Physical therapy can address pt's therapy needs while she is hospitalized. Defer OT to next level of care.? Defer discharge recommendations to PT.? Will complete orders.

## 2023-09-28 NOTE — PLAN OF CARE
Patient up with assist of 2, gait belt, and walker to [facility]. Had bladder incontinence once this afternoon. Patient seems A&Ox4 with some forgetfulness.

## 2023-09-28 NOTE — PROGRESS NOTES
Orthopedic Surgery  Debbie Boucher  09/28/2023     Admit Date:  9/25/2023    POD: 1 Day Post-Op   Procedure(s):  Right distal radius fracture open reduction internal fixation, closed treatment of an ulnar styloid fracture, Right - Wrist    2 Days Post-OP   Procedure: Closed reduction insertion IM nail, right hip      Patient resting comfortably sitting in her chair this morning.   Reports pain of the right wrist and elbow, most significant over the medial region of her elbow. Minimal pain of the right hip.   Tolerating oral intake.    Denies nausea or vomiting  Denies chest pain or shortness of breath    Temp:  [97  F (36.1  C)-98.3  F (36.8  C)] 98.3  F (36.8  C)  Pulse:  [60-77] 75  Resp:  [11-20] 16  BP: (108-195)/(47-96) 156/62  SpO2:  [90 %-97 %] 97 %    Alert and oriented  Right short arm splint in place. Moderate bruising of the fingers and over the medial aspect of the elbow. Moderate swelling of the fingers. She is able to actively extend right thumb. ROM of fingers painful. Flexion of elbow limited and painful. Full extension of elbow.     Dressing over her right hip is clean, dry, and intact. Minimal erythema of the surrounding skin.  Bilateral calves are soft, non-tender.  Right lower extremity is NVI.  Sensation intact bilateral lower extremities.  Patient able to resist dorsi and plantar flexion bilaterally.  +Dp pulse.    Labs:  Recent Labs   Lab Test 09/28/23  0713 09/27/23  0809 09/26/23  1450 09/26/23  0813 09/25/23  1018   WBC 5.3  --   --  5.8 6.2   HGB 7.0* 8.3* 7.5* 8.9* 12.0   *  --   --  158 231     Recent Labs   Lab Test 09/26/23  0813   INR 1.04     No lab results found.      1. PLAN:   Continue ASA for DVT prophylaxis.     Mobilize with PT/OT    WBAT right lower extremity. May weight bear through the right forearm.    Encouraged gentle ROM of fingers, icing above and below short arm splint, in addition to elevation of the right upper extremity to help with swelling in fingers.     Continue current pain regiment.   Dressings: Keep intact.  Change if >60% saturated or peeling off. Leave short arm splint in place x 1 week. Keep splint clean, dry and intact.    Follow-up: 2 weeks post-op with Dr. Lane, and 1 week follow up with Dr. Kaur's team for removal of splint and cast placement.     2. Disposition   Anticipate d/c to TCU when medically cleared and progressing in PT.    Adriane Lazo PA-C  [facility]

## 2023-09-28 NOTE — ANESTHESIA POSTPROCEDURE EVALUATION
Patient: Debbie Boucher    Procedure: Procedure(s):  Right distal radius fracture open reduction internal fixation, closed treatment of an ulnar styloid fracture, Right - Wrist       Anesthesia Type:  Peripheral Nerve Block    Note:     Postop Pain Control: Uneventful            Sign Out: Well controlled pain   PONV: No   Neuro/Psych: Uneventful            Sign Out: Acceptable/Baseline neuro status   Airway/Respiratory:             Sign Out: Acceptable/Baseline resp. status   CV/Hemodynamics:             Sign Out: Acceptable CV status   Other NRE:    DID A NON-ROUTINE EVENT OCCUR?            Last vitals:  Vitals Value Taken Time   /68 09/28/23 1629   Temp 98.6  F (37  C) 09/28/23 1629   Pulse 68 09/28/23 1629   Resp 18 09/28/23 1629   SpO2 96 % 09/28/23 1629       Electronically Signed By: Jimbo Marcum DO  September 28, 2023  6:05 PM
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
## 2023-09-28 NOTE — PLAN OF CARE
Goal Outcome Evaluation:      Plan of Care Reviewed With: patient, child    Overall Patient Progress: improvingOverall Patient Progress: improving     Afeb, BP running higher than her normal, sats good on r/a, oriented times 4 but daughter says she is not as clear as normal. Rated her pain an 8, took oxycodone this am, was nauseated at lunch time and declined a pain pill. Up with assist of 2 and platform walker, sat in the chair for both meals, ambulated outside of the once and in the room twice.  Up to bedside commode to void, removing the external catheter herself but was also incontinent a couple of times when she was moved. Right hand swollen and bruised, trying to keep it elevated and iced. Right hip dressing changed since it was coming off. Three smaller incisions all dry with staples intact.  Hgb 7.0 this am, recheck at 12 noon 7.4. Will need TCU on discharge.

## 2023-09-29 ENCOUNTER — APPOINTMENT (OUTPATIENT)
Dept: PHYSICAL THERAPY | Facility: CLINIC | Age: 78
DRG: 481 | End: 2023-09-29
Payer: MEDICARE

## 2023-09-29 LAB
ABO/RH(D): NORMAL
ANION GAP SERPL CALCULATED.3IONS-SCNC: 8 MMOL/L (ref 7–15)
ANTIBODY SCREEN: NEGATIVE
BLD PROD TYP BPU: NORMAL
BLOOD COMPONENT TYPE: NORMAL
BUN SERPL-MCNC: 7.7 MG/DL (ref 8–23)
CALCIUM SERPL-MCNC: 8 MG/DL (ref 8.8–10.2)
CHLORIDE SERPL-SCNC: 102 MMOL/L (ref 98–107)
CODING SYSTEM: NORMAL
CREAT SERPL-MCNC: 0.61 MG/DL (ref 0.51–0.95)
CROSSMATCH: NORMAL
DEPRECATED HCO3 PLAS-SCNC: 31 MMOL/L (ref 22–29)
EGFRCR SERPLBLD CKD-EPI 2021: >90 ML/MIN/1.73M2
ERYTHROCYTE [DISTWIDTH] IN BLOOD BY AUTOMATED COUNT: 13.2 % (ref 10–15)
GLUCOSE SERPL-MCNC: 91 MG/DL (ref 70–99)
GLUCOSE SERPL-MCNC: 91 MG/DL (ref 70–99)
HCT VFR BLD AUTO: 21.2 % (ref 35–47)
HGB BLD-MCNC: 6.8 G/DL (ref 11.7–15.7)
ISSUE DATE AND TIME: NORMAL
MCH RBC QN AUTO: 33.7 PG (ref 26.5–33)
MCHC RBC AUTO-ENTMCNC: 32.1 G/DL (ref 31.5–36.5)
MCV RBC AUTO: 105 FL (ref 78–100)
PLATELET # BLD AUTO: 127 10E3/UL (ref 150–450)
POTASSIUM SERPL-SCNC: 3.7 MMOL/L (ref 3.4–5.3)
RBC # BLD AUTO: 2.02 10E6/UL (ref 3.8–5.2)
SODIUM SERPL-SCNC: 141 MMOL/L (ref 135–145)
SPECIMEN EXPIRATION DATE: NORMAL
UNIT ABO/RH: NORMAL
UNIT NUMBER: NORMAL
UNIT STATUS: NORMAL
UNIT TYPE ISBT: 6200
WBC # BLD AUTO: 4.6 10E3/UL (ref 4–11)

## 2023-09-29 PROCEDURE — 36415 COLL VENOUS BLD VENIPUNCTURE: CPT | Performed by: ORTHOPAEDIC SURGERY

## 2023-09-29 PROCEDURE — 86923 COMPATIBILITY TEST ELECTRIC: CPT | Performed by: INTERNAL MEDICINE

## 2023-09-29 PROCEDURE — 250N000013 HC RX MED GY IP 250 OP 250 PS 637: Performed by: ORTHOPAEDIC SURGERY

## 2023-09-29 PROCEDURE — 97116 GAIT TRAINING THERAPY: CPT | Mod: GP

## 2023-09-29 PROCEDURE — 250N000011 HC RX IP 250 OP 636: Performed by: ORTHOPAEDIC SURGERY

## 2023-09-29 PROCEDURE — 82310 ASSAY OF CALCIUM: CPT | Performed by: INTERNAL MEDICINE

## 2023-09-29 PROCEDURE — 85027 COMPLETE CBC AUTOMATED: CPT | Performed by: INTERNAL MEDICINE

## 2023-09-29 PROCEDURE — 86850 RBC ANTIBODY SCREEN: CPT | Performed by: ORTHOPAEDIC SURGERY

## 2023-09-29 PROCEDURE — 99233 SBSQ HOSP IP/OBS HIGH 50: CPT | Performed by: INTERNAL MEDICINE

## 2023-09-29 PROCEDURE — 120N000001 HC R&B MED SURG/OB

## 2023-09-29 PROCEDURE — 86901 BLOOD TYPING SEROLOGIC RH(D): CPT | Performed by: ORTHOPAEDIC SURGERY

## 2023-09-29 PROCEDURE — P9016 RBC LEUKOCYTES REDUCED: HCPCS | Performed by: INTERNAL MEDICINE

## 2023-09-29 PROCEDURE — 250N000013 HC RX MED GY IP 250 OP 250 PS 637: Performed by: INTERNAL MEDICINE

## 2023-09-29 PROCEDURE — 36415 COLL VENOUS BLD VENIPUNCTURE: CPT | Performed by: INTERNAL MEDICINE

## 2023-09-29 RX ORDER — ASPIRIN 325 MG
325 TABLET, DELAYED RELEASE (ENTERIC COATED) ORAL DAILY
Qty: 30 TABLET | Refills: 0 | Status: SHIPPED | OUTPATIENT
Start: 2023-09-30 | End: 2023-11-22

## 2023-09-29 RX ORDER — POLYETHYLENE GLYCOL 3350 17 G/17G
17 POWDER, FOR SOLUTION ORAL DAILY
Qty: 510 G | Refills: 0 | Status: SHIPPED | OUTPATIENT
Start: 2023-09-29 | End: 2023-10-02

## 2023-09-29 RX ORDER — ACETAMINOPHEN 325 MG/1
650 TABLET ORAL EVERY 4 HOURS PRN
Qty: 100 TABLET | Refills: 0 | Status: SHIPPED | OUTPATIENT
Start: 2023-09-29 | End: 2023-10-02

## 2023-09-29 RX ORDER — LISINOPRIL 20 MG/1
20 TABLET ORAL DAILY
Status: DISCONTINUED | OUTPATIENT
Start: 2023-09-29 | End: 2023-10-01 | Stop reason: HOSPADM

## 2023-09-29 RX ORDER — OXYCODONE HYDROCHLORIDE 5 MG/1
2.5 TABLET ORAL EVERY 4 HOURS PRN
Qty: 20 TABLET | Refills: 0 | Status: SHIPPED | OUTPATIENT
Start: 2023-09-29 | End: 2023-10-02

## 2023-09-29 RX ADMIN — ONDANSETRON 4 MG: 4 TABLET, ORALLY DISINTEGRATING ORAL at 10:47

## 2023-09-29 RX ADMIN — TRAZODONE HYDROCHLORIDE 100 MG: 100 TABLET ORAL at 21:05

## 2023-09-29 RX ADMIN — FLUOXETINE 40 MG: 20 CAPSULE ORAL at 07:49

## 2023-09-29 RX ADMIN — OXYCODONE HYDROCHLORIDE 5 MG: 5 TABLET ORAL at 15:33

## 2023-09-29 RX ADMIN — Medication 100 MCG: at 07:49

## 2023-09-29 RX ADMIN — PANTOPRAZOLE SODIUM 40 MG: 40 TABLET, DELAYED RELEASE ORAL at 07:50

## 2023-09-29 RX ADMIN — ACETAMINOPHEN 975 MG: 325 TABLET, FILM COATED ORAL at 17:53

## 2023-09-29 RX ADMIN — LEVOTHYROXINE SODIUM 75 MCG: 0.07 TABLET ORAL at 07:50

## 2023-09-29 RX ADMIN — FAMOTIDINE 20 MG: 20 TABLET ORAL at 21:05

## 2023-09-29 RX ADMIN — LISINOPRIL 20 MG: 20 TABLET ORAL at 09:10

## 2023-09-29 RX ADMIN — BUPROPION HYDROCHLORIDE 150 MG: 150 TABLET, EXTENDED RELEASE ORAL at 07:50

## 2023-09-29 RX ADMIN — ASPIRIN 325 MG: 325 TABLET, COATED ORAL at 07:50

## 2023-09-29 RX ADMIN — FAMOTIDINE 20 MG: 20 TABLET ORAL at 07:50

## 2023-09-29 RX ADMIN — OXYCODONE HYDROCHLORIDE 5 MG: 5 TABLET ORAL at 07:54

## 2023-09-29 RX ADMIN — ACETAMINOPHEN 975 MG: 325 TABLET, FILM COATED ORAL at 08:02

## 2023-09-29 RX ADMIN — OXYCODONE HYDROCHLORIDE 5 MG: 5 TABLET ORAL at 21:05

## 2023-09-29 RX ADMIN — FOLIC ACID 1 MG: 1 TABLET ORAL at 07:49

## 2023-09-29 RX ADMIN — Medication 50 MCG: at 07:50

## 2023-09-29 ASSESSMENT — ACTIVITIES OF DAILY LIVING (ADL)
ADLS_ACUITY_SCORE: 40
ADLS_ACUITY_SCORE: 38
ADLS_ACUITY_SCORE: 40
ADLS_ACUITY_SCORE: 40
ADLS_ACUITY_SCORE: 38
ADLS_ACUITY_SCORE: 38
ADLS_ACUITY_SCORE: 36
ADLS_ACUITY_SCORE: 38
ADLS_ACUITY_SCORE: 36

## 2023-09-29 NOTE — PLAN OF CARE
Goal Outcome Evaluation:         Patient vital signs are at baseline: Yes  Patient able to ambulate as they were prior to admission or with assist devices provided by therapies during their stay:  No,  Reason:  TCU planned  Patient MUST void prior to discharge:  Yes  Patient able to tolerate oral intake:  Yes  Pain has adequate pain control using Oral analgesics:  Yes  Does patient have an identified :  Yes  Has goal D/C date and time been discussed with patient:  Yes        Patient intermittently confused per family. Able to answer orientation questions. Did not remember [facility] PA changing dressing in AM. Hgb 6.8 in AM. Consent signed on 9/25/23. Type and screen ordered and 1 rpbc infused. Family would like to be updated in the future prior to blood infusions or other changes in status.    Patient a1gbaw. Incontinent of urine x2 requiring linen changes.

## 2023-09-29 NOTE — PLAN OF CARE
Goal Outcome Evaluation:    Patient vital signs are at baseline: Yes  Patient able to ambulate as they were prior to admission or with assist devices provided by therapies during their stay:  Yes  Patient MUST void prior to discharge:  Yes  Patient able to tolerate oral intake:  Yes  Pain has adequate pain control using Oral analgesics:  Yes  Does patient have an identified :  Yes  Has goal D/C date and time been discussed with patient:  No,  Reason:  Pending placement.    Patient alert, oriented and up with assist of 2 GB/platform walker. Diet well tolerated. On room air. PIV saline locked. Voiding without issues. PRN oxycodone given for pain. Dressings C/D/I. WBAT on right hip. WB through right arm. Plan for TCU discharge. SW sent referrals.

## 2023-09-29 NOTE — PROGRESS NOTES
Orthopedic Surgery  9/29/2023  POD 2 for right wrist   POD 3 for right hip    S: Patient voices no unexpected ortho complaints today. Denies chest pain or shortness of breath. Hard of hearing but verbalizes needs well. Demonstrated has dexterity with right hand to  and remove cap to chapstick.    O: Blood pressure (!) 168/61, pulse 80, temperature 98.5  F (36.9  C), temperature source Temporal, resp. rate 18, weight 46.7 kg (102 lb 15.3 oz), SpO2 93 %, not currently breastfeeding.  Lab Results   Component Value Date    HGB 6.8 09/29/2023    HGB 14.6 02/01/2017     Lab Results   Component Value Date    INR 1.04 09/26/2023     I/O last 3 completed shifts:  In: 3114 [P.O.:120; I.V.:2994]  Out: 900 [Urine:900]  Distal extremity CMSI bilaterally.  Calves are negative bilaterally, both soft and nontender.  The dressing is C/D/I.  Removed dressing.  Incisions well approximated with staples.  Fingers swollen and bruised but has AROM to fingers with dexterity and good cap refill  Ecchymosis to right forearm/elbow, fingers. Splint in place.      A: Ms. Boucher is doing well status post Procedure(s):  Right distal radius fracture open reduction internal fixation, closed treatment of an ulnar styloid fracture, Right - Wrist.    P: Noted low HGB will defer to medicine regarding this Continue physical therapy. Encourage hand elevation for swelling. May WB through right forearm with platform walker. May WBAT on right hip. Continue DVT pphx with ASA. Dressing changed to right hip with Aqucell waterproof bandage. Anticipate discharge to TCU when medically cleared.    Kath Ugalde PA-C  [phone]
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
## 2023-09-29 NOTE — PROGRESS NOTES
Care Management Follow Up    Length of Stay (days): 4    Expected Discharge Date: 09/29/2023     Concerns to be Addressed:     Disposition  Patient plan of care discussed at interdisciplinary rounds: Yes    Anticipated Discharge Disposition:  TCU     Anticipated Discharge Services: TCU   Anticipated Discharge DME:  TCU will provide    Patient/family educated on Medicare website which has current facility and service quality ratings:  yes  Education Provided on the Discharge Plan:  yes  Patient/Family in Agreement with the Plan:  yes    Referrals Placed by CM/SW:  yes  Private pay costs discussed: private room/amenity fees and transportation costs    Additional Information:  Patient has been accepted at [facility] for a Monday 10/2/23 admission. [facility] choice is [facility]. Spoke with their admissions. Currently they do not have any beds available. They could have beds Sunday or Monday.Updated patient and daughter, Jen, they would like [facility] first but they are comfortable with [facility] if [facility] has no beds.    BRYAN will continue to assist with discharge planning.    ABENA White   Inpatient Care Coordination   Supervisor  [facility]  [phone]    ABENA Moss

## 2023-09-30 ENCOUNTER — APPOINTMENT (OUTPATIENT)
Dept: PHYSICAL THERAPY | Facility: CLINIC | Age: 78
DRG: 481 | End: 2023-09-30
Payer: MEDICARE

## 2023-09-30 LAB
ANION GAP SERPL CALCULATED.3IONS-SCNC: 12 MMOL/L (ref 7–15)
BUN SERPL-MCNC: 7.4 MG/DL (ref 8–23)
CALCIUM SERPL-MCNC: 8.4 MG/DL (ref 8.8–10.2)
CHLORIDE SERPL-SCNC: 100 MMOL/L (ref 98–107)
CREAT SERPL-MCNC: 0.56 MG/DL (ref 0.51–0.95)
DEPRECATED HCO3 PLAS-SCNC: 28 MMOL/L (ref 22–29)
EGFRCR SERPLBLD CKD-EPI 2021: >90 ML/MIN/1.73M2
ERYTHROCYTE [DISTWIDTH] IN BLOOD BY AUTOMATED COUNT: 14.6 % (ref 10–15)
GLUCOSE SERPL-MCNC: 139 MG/DL (ref 70–99)
HCT VFR BLD AUTO: 30.4 % (ref 35–47)
HGB BLD-MCNC: 10 G/DL (ref 11.7–15.7)
MCH RBC QN AUTO: 33.7 PG (ref 26.5–33)
MCHC RBC AUTO-ENTMCNC: 32.9 G/DL (ref 31.5–36.5)
MCV RBC AUTO: 102 FL (ref 78–100)
PLATELET # BLD AUTO: 174 10E3/UL (ref 150–450)
POTASSIUM SERPL-SCNC: 3.6 MMOL/L (ref 3.4–5.3)
RBC # BLD AUTO: 2.97 10E6/UL (ref 3.8–5.2)
SODIUM SERPL-SCNC: 140 MMOL/L (ref 135–145)
WBC # BLD AUTO: 5.1 10E3/UL (ref 4–11)

## 2023-09-30 PROCEDURE — 250N000013 HC RX MED GY IP 250 OP 250 PS 637: Performed by: ORTHOPAEDIC SURGERY

## 2023-09-30 PROCEDURE — 120N000001 HC R&B MED SURG/OB

## 2023-09-30 PROCEDURE — 36415 COLL VENOUS BLD VENIPUNCTURE: CPT | Performed by: INTERNAL MEDICINE

## 2023-09-30 PROCEDURE — 250N000013 HC RX MED GY IP 250 OP 250 PS 637: Performed by: INTERNAL MEDICINE

## 2023-09-30 PROCEDURE — 85027 COMPLETE CBC AUTOMATED: CPT | Performed by: INTERNAL MEDICINE

## 2023-09-30 PROCEDURE — 80048 BASIC METABOLIC PNL TOTAL CA: CPT | Performed by: INTERNAL MEDICINE

## 2023-09-30 PROCEDURE — 97116 GAIT TRAINING THERAPY: CPT | Mod: GP | Performed by: PHYSICAL THERAPIST

## 2023-09-30 PROCEDURE — 97530 THERAPEUTIC ACTIVITIES: CPT | Mod: GP | Performed by: PHYSICAL THERAPIST

## 2023-09-30 PROCEDURE — 99232 SBSQ HOSP IP/OBS MODERATE 35: CPT | Performed by: INTERNAL MEDICINE

## 2023-09-30 RX ADMIN — OXYCODONE HYDROCHLORIDE 5 MG: 5 TABLET ORAL at 10:58

## 2023-09-30 RX ADMIN — ASPIRIN 325 MG: 325 TABLET, COATED ORAL at 09:30

## 2023-09-30 RX ADMIN — FAMOTIDINE 20 MG: 20 TABLET ORAL at 09:30

## 2023-09-30 RX ADMIN — FOLIC ACID 1 MG: 1 TABLET ORAL at 09:30

## 2023-09-30 RX ADMIN — OXYCODONE HYDROCHLORIDE 5 MG: 5 TABLET ORAL at 06:52

## 2023-09-30 RX ADMIN — FAMOTIDINE 20 MG: 20 TABLET ORAL at 21:31

## 2023-09-30 RX ADMIN — ACETAMINOPHEN 650 MG: 325 TABLET, FILM COATED ORAL at 10:04

## 2023-09-30 RX ADMIN — LISINOPRIL 20 MG: 20 TABLET ORAL at 09:30

## 2023-09-30 RX ADMIN — PANTOPRAZOLE SODIUM 40 MG: 40 TABLET, DELAYED RELEASE ORAL at 09:30

## 2023-09-30 RX ADMIN — BUPROPION HYDROCHLORIDE 150 MG: 150 TABLET, EXTENDED RELEASE ORAL at 09:30

## 2023-09-30 RX ADMIN — TRAZODONE HYDROCHLORIDE 100 MG: 100 TABLET ORAL at 21:31

## 2023-09-30 RX ADMIN — Medication 50 MCG: at 09:31

## 2023-09-30 RX ADMIN — LEVOTHYROXINE SODIUM 75 MCG: 0.07 TABLET ORAL at 09:31

## 2023-09-30 RX ADMIN — Medication 100 MCG: at 09:30

## 2023-09-30 RX ADMIN — FLUOXETINE 40 MG: 20 CAPSULE ORAL at 09:31

## 2023-09-30 RX ADMIN — OXYCODONE HYDROCHLORIDE 5 MG: 5 TABLET ORAL at 16:59

## 2023-09-30 ASSESSMENT — ACTIVITIES OF DAILY LIVING (ADL)
ADLS_ACUITY_SCORE: 38
ADLS_ACUITY_SCORE: 37
ADLS_ACUITY_SCORE: 38
ADLS_ACUITY_SCORE: 38
ADLS_ACUITY_SCORE: 37
ADLS_ACUITY_SCORE: 38
ADLS_ACUITY_SCORE: 37
ADLS_ACUITY_SCORE: 37
ADLS_ACUITY_SCORE: 38

## 2023-09-30 NOTE — PROGRESS NOTES
Orthopedics Daily Progress Note  Debbie RUIZ Sander  September 30, 2023  Date of Admission: 9/25/2023      Post Op Day: 2 wrist & 3 hip  Procedures:   Right distal radius fracture open reduction internal fixation, closed treatment of an ulnar styloid fracture, Right - Wrist   Closed reduction insertion IM nail, right hip        Interval History:  Doing well this morning. No concerns from ortho stand point.  Pain controlled.  Denies cp or sob, N/V.     Temp:  [97.4  F (36.3  C)-99.1  F (37.3  C)] 97.6  F (36.4  C)  Pulse:  [68-75] 69  Resp:  [16-18] 16  BP: (151-178)/(42-67) 162/64  SpO2:  [94 %-96 %] 96 %    Physical Exam:  Alert, appropriate, and following commands  Breathing easily without wheeze  Hip Dressing/wound c/d/I  Bilateral calves soft, compressible, non tender  5/5 df/pf/ehl, SILT, palpable dp pulse  Fingers swollen and bruised but has AROM to fingers with dexterity and good cap refill, SILT  Ecchymosis to right forearm/elbow, fingers. Splint in place.    Labs/Imaging:  Results for orders placed or performed during the hospital encounter of 09/25/23 (from the past 24 hour(s))   CBC with platelets   Result Value Ref Range    WBC Count 4.6 4.0 - 11.0 10e3/uL    RBC Count 2.02 (L) 3.80 - 5.20 10e6/uL    Hemoglobin 6.8 (LL) 11.7 - 15.7 g/dL    Hematocrit 21.2 (L) 35.0 - 47.0 %     (H) 78 - 100 fL    MCH 33.7 (H) 26.5 - 33.0 pg    MCHC 32.1 31.5 - 36.5 g/dL    RDW 13.2 10.0 - 15.0 %    Platelet Count 127 (L) 150 - 450 10e3/uL   Basic metabolic panel   Result Value Ref Range    Sodium 141 135 - 145 mmol/L    Potassium 3.7 3.4 - 5.3 mmol/L    Chloride 102 98 - 107 mmol/L    Carbon Dioxide (CO2) 31 (H) 22 - 29 mmol/L    Anion Gap 8 7 - 15 mmol/L    Urea Nitrogen 7.7 (L) 8.0 - 23.0 mg/dL    Creatinine 0.61 0.51 - 0.95 mg/dL    GFR Estimate >90 >60 mL/min/1.73m2    Calcium 8.0 (L) 8.8 - 10.2 mg/dL    Glucose 91 70 - 99 mg/dL   Glucose   Result Value Ref Range    Glucose 91 70 - 99 mg/dL   Prepare red blood  cells (unit)   Result Value Ref Range    Blood Component Type Red Blood Cells     Product Code M3780T00     Unit Status Transfused     Unit Number [id number]     CROSSMATCH Compatible     CODING SYSTEM [id number]     ISSUE DATE AND TIME [id number]     UNIT ABO/RH A+     UNIT TYPE ISBT 6200    ABO/Rh type and screen    Narrative    The following orders were created for panel order ABO/Rh type and screen.  Procedure                               Abnormality         Status                     ---------                               -----------         ------                     Adult Type and Screen[[id number]]                            Final result                 Please view results for these tests on the individual orders.   Adult Type and Screen   Result Value Ref Range    ABO/RH(D) A POS     Antibody Screen Negative Negative    SPECIMEN EXPIRATION DATE [id number]          A/P - 78 year old female s/p right hip nail and right radius ORIF      DVT proph: ASA 325mg qd  Activity: WBAT right lower extremity. May weight bear through the right forearm. Encouraged gentle ROM of fingers, icing above and below short arm splint, in addition to elevation of the right upper extremity to help with swelling in fingers.   Dressing: Keep intact.  Change if >60% saturated or peeling off. Leave short arm splint in place x 1 week. Keep splint clean, dry and intact.      Follow-up: 2 weeks post-op with Dr. Lane, and 1 week follow up with Dr. Kaur's team for removal of splint and cast placement.      Dispo: TCU on Mon 10/2. Pending PT progression and stable medically.       Katherin Jennings PA-C  [facility]       heavy meconium

## 2023-09-30 NOTE — PROGRESS NOTES
Care Management Follow Up    Length of Stay (days): 5    Expected Discharge Date: 09/30/2023     Additional Information:    SW received notification from Giselle at [facility] that they will have a private room available tomorrow for admittance at 3 pm. BRYAN notified pt at bedside and then called pt's daughter (Jen). Pt's daughter stated they wanted to take the room at [facility] and decline the room at [facility]. BRYAN notified Giselle at [facility] and at this time discharge is projected for tomorrow. Pt's family would like transportation provided by [facility].    RIKKI Jain, [facility]   Care Management

## 2023-09-30 NOTE — PLAN OF CARE
Goal Outcome Evaluation:    Patient vital signs are at baseline: Yes  Patient able to ambulate as they were prior to admission or with assist devices provided by therapies during their stay: No-planned for TCU at discharge  Patient MUST void prior to discharge:  Yes  Patient able to tolerate oral intake:  Yes  Pain has adequate pain control using Oral analgesics:  Yes  Does patient have an identified :  Yes  Has goal D/C date and time been discussed with patient:  Yes    Pt is forgetful at times. [place]-does not like using her hearing aids. Saline locked. Room air. Using BR, but has had several incontinent episodes. Cms-2+ edema with some bruising. Pain managed with Oxycodone. Coccyx-reddened. Tolerating repositioning-declining ice. No other significant issues noted overnight.
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
## 2023-09-30 NOTE — PLAN OF CARE
Goal Outcome Evaluation:         Patient vital signs are at baseline: Yes  Patient able to ambulate as they were prior to admission or with assist devices provided by therapies during their stay:  No. TCU tomorrow  Patient MUST void prior to discharge:  Yes  Patient able to tolerate oral intake:  Yes  Pain has adequate pain control using Oral analgesics:  Yes  Does patient have an identified :  Yes  Has goal D/C date and time been discussed with patient:  Yes        Patient aox4. Forgetful. Frequent urination for 100-200mL. Incontinent of bladder at times. Ambulated in dee. PRN oxy given x2 with relief of symptoms. Plan for Tcu tomorrow.

## 2023-10-01 ENCOUNTER — LAB REQUISITION (OUTPATIENT)
Dept: LAB | Facility: CLINIC | Age: 78
End: 2023-10-01
Payer: MEDICARE

## 2023-10-01 ENCOUNTER — APPOINTMENT (OUTPATIENT)
Dept: PHYSICAL THERAPY | Facility: CLINIC | Age: 78
DRG: 481 | End: 2023-10-01
Payer: MEDICARE

## 2023-10-01 VITALS
DIASTOLIC BLOOD PRESSURE: 70 MMHG | HEART RATE: 72 BPM | SYSTOLIC BLOOD PRESSURE: 169 MMHG | BODY MASS INDEX: 20.11 KG/M2 | OXYGEN SATURATION: 94 % | RESPIRATION RATE: 16 BRPM | WEIGHT: 102.95 LBS | TEMPERATURE: 97.9 F

## 2023-10-01 DIAGNOSIS — Z11.1 ENCOUNTER FOR SCREENING FOR RESPIRATORY TUBERCULOSIS: ICD-10-CM

## 2023-10-01 DIAGNOSIS — U07.1 COVID-19: ICD-10-CM

## 2023-10-01 LAB
ANION GAP SERPL CALCULATED.3IONS-SCNC: 8 MMOL/L (ref 7–15)
BUN SERPL-MCNC: 8.4 MG/DL (ref 8–23)
CALCIUM SERPL-MCNC: 8.3 MG/DL (ref 8.8–10.2)
CHLORIDE SERPL-SCNC: 103 MMOL/L (ref 98–107)
CREAT SERPL-MCNC: 0.59 MG/DL (ref 0.51–0.95)
DEPRECATED HCO3 PLAS-SCNC: 31 MMOL/L (ref 22–29)
EGFRCR SERPLBLD CKD-EPI 2021: >90 ML/MIN/1.73M2
ERYTHROCYTE [DISTWIDTH] IN BLOOD BY AUTOMATED COUNT: 14.3 % (ref 10–15)
GLUCOSE SERPL-MCNC: 104 MG/DL (ref 70–99)
HCT VFR BLD AUTO: 28.5 % (ref 35–47)
HGB BLD-MCNC: 9.3 G/DL (ref 11.7–15.7)
HGB BLD-MCNC: 9.9 G/DL (ref 11.7–15.7)
MCH RBC QN AUTO: 33.5 PG (ref 26.5–33)
MCHC RBC AUTO-ENTMCNC: 32.6 G/DL (ref 31.5–36.5)
MCV RBC AUTO: 103 FL (ref 78–100)
PLATELET # BLD AUTO: 194 10E3/UL (ref 150–450)
POTASSIUM SERPL-SCNC: 3.8 MMOL/L (ref 3.4–5.3)
RBC # BLD AUTO: 2.78 10E6/UL (ref 3.8–5.2)
SODIUM SERPL-SCNC: 142 MMOL/L (ref 135–145)
WBC # BLD AUTO: 3.9 10E3/UL (ref 4–11)

## 2023-10-01 PROCEDURE — 99239 HOSP IP/OBS DSCHRG MGMT >30: CPT | Performed by: INTERNAL MEDICINE

## 2023-10-01 PROCEDURE — 250N000013 HC RX MED GY IP 250 OP 250 PS 637: Performed by: INTERNAL MEDICINE

## 2023-10-01 PROCEDURE — 85027 COMPLETE CBC AUTOMATED: CPT | Performed by: INTERNAL MEDICINE

## 2023-10-01 PROCEDURE — 85018 HEMOGLOBIN: CPT | Performed by: INTERNAL MEDICINE

## 2023-10-01 PROCEDURE — 250N000013 HC RX MED GY IP 250 OP 250 PS 637: Performed by: ORTHOPAEDIC SURGERY

## 2023-10-01 PROCEDURE — 80048 BASIC METABOLIC PNL TOTAL CA: CPT | Performed by: INTERNAL MEDICINE

## 2023-10-01 PROCEDURE — 97530 THERAPEUTIC ACTIVITIES: CPT | Mod: GP | Performed by: PHYSICAL THERAPIST

## 2023-10-01 PROCEDURE — 36415 COLL VENOUS BLD VENIPUNCTURE: CPT | Performed by: INTERNAL MEDICINE

## 2023-10-01 PROCEDURE — 87635 SARS-COV-2 COVID-19 AMP PRB: CPT | Performed by: NURSE PRACTITIONER

## 2023-10-01 RX ORDER — ACETAMINOPHEN 325 MG/1
325-650 TABLET ORAL EVERY 6 HOURS PRN
Status: DISCONTINUED | OUTPATIENT
Start: 2023-10-01 | End: 2023-10-01 | Stop reason: HOSPADM

## 2023-10-01 RX ADMIN — Medication 100 MCG: at 09:05

## 2023-10-01 RX ADMIN — ASPIRIN 325 MG: 325 TABLET, COATED ORAL at 09:04

## 2023-10-01 RX ADMIN — OXYCODONE HYDROCHLORIDE 5 MG: 5 TABLET ORAL at 05:37

## 2023-10-01 RX ADMIN — PANTOPRAZOLE SODIUM 40 MG: 40 TABLET, DELAYED RELEASE ORAL at 09:05

## 2023-10-01 RX ADMIN — ACETAMINOPHEN 650 MG: 325 TABLET, FILM COATED ORAL at 12:16

## 2023-10-01 RX ADMIN — FOLIC ACID 1 MG: 1 TABLET ORAL at 09:05

## 2023-10-01 RX ADMIN — Medication 2.5 MG: at 14:54

## 2023-10-01 RX ADMIN — BUPROPION HYDROCHLORIDE 150 MG: 150 TABLET, EXTENDED RELEASE ORAL at 09:05

## 2023-10-01 RX ADMIN — LEVOTHYROXINE SODIUM 75 MCG: 0.07 TABLET ORAL at 09:05

## 2023-10-01 RX ADMIN — FLUOXETINE 40 MG: 20 CAPSULE ORAL at 09:04

## 2023-10-01 RX ADMIN — LISINOPRIL 20 MG: 20 TABLET ORAL at 09:05

## 2023-10-01 RX ADMIN — FAMOTIDINE 20 MG: 20 TABLET ORAL at 09:05

## 2023-10-01 RX ADMIN — OXYCODONE HYDROCHLORIDE 5 MG: 5 TABLET ORAL at 10:34

## 2023-10-01 RX ADMIN — Medication 50 MCG: at 09:04

## 2023-10-01 ASSESSMENT — ACTIVITIES OF DAILY LIVING (ADL)
ADLS_ACUITY_SCORE: 37
ADLS_ACUITY_SCORE: 38
ADLS_ACUITY_SCORE: 37

## 2023-10-01 NOTE — PLAN OF CARE
Goal Outcome Evaluation:         Patient vital signs are at baseline: Yes  Patient able to ambulate as they were prior to admission or with assist devices provided by therapies during their stay:  No,  Reason:  TCU  Patient MUST void prior to discharge:  Yes  Patient able to tolerate oral intake:  Yes  Pain has adequate pain control using Oral analgesics:  Yes  Does patient have an identified :  Yes  Has goal D/C date and time been discussed with patient:  Yes        Patient aox4. Ambulates a1gbaw. Denies numbness and tingling in extremities. PRN oxy given for pain.    TCU packet completed. Transported by family.

## 2023-10-01 NOTE — PLAN OF CARE
Goal Outcome Evaluation:       .Patient vital signs are at baseline: Yes  Patient able to ambulate as they were prior to admission or with assist devices provided by therapies during their stay:  assist of 1 , will need some TCU  Patient MUST void prior to discharge:  Yes  Patient able to tolerate oral intake:  Yes  Pain has adequate pain control using Oral analgesics:  Yes  Does patient have an identified :  Yes  Has goal D/C date and time been discussed with patient:  Yes    Pt is alert with some forgetful at times. [place]ona does not like to use her hearing aids . CMS 2 + edema with some bruising. Turn and repositioned coccyx red . Can get oxycodone for pain as needed.Sleeping comfortably.

## 2023-10-01 NOTE — PLAN OF CARE
Goal Outcome Evaluation:    Patient vital signs are at baseline: Yes  Patient able to ambulate as they were prior to admission or with assist devices provided by therapies during their stay:  No,  Reason: planned Tcu  Patient MUST void prior to discharge:  Yes  Patient able to tolerate oral intake:  Yes  Pain has adequate pain control using Oral analgesics:  Yes  Does patient have an identified :  Yes  Has goal D/C date and time been discussed with patient:  Yes    Pt is forgetful at times. [place]-does not like using her hearing aids. Saline locked. Room air. Voiding-incontinence-cares completed. Cms-2+ edema with some bruising. Pain managed with Oxycodone. Coccyx-reddened. Tolerating repositioning-declining ice. No other significant issues noted this evening.

## 2023-10-01 NOTE — PROGRESS NOTES
Orthopedics Daily Progress Note  Debbie Boucher  October 1, 2023  Date of Admission: 9/25/2023      Post Op Day: 4 wrist & 5 hip  Procedures:   Right distal radius fracture open reduction internal fixation, closed treatment of an ulnar styloid fracture, Right - Wrist   Closed reduction insertion IM nail, right hip        Interval History:  Doing well this morning. Fatigued. Endorsing pruritus to splint area. No other concerns from ortho stand point.  Pain controlled.  Denies cp or sob, N/V.     Temp:  [97.9  F (36.6  C)-98.8  F (37.1  C)] 97.9  F (36.6  C)  Pulse:  [60-82] 72  Resp:  [16] 16  BP: (154-176)/(58-70) 169/70  SpO2:  [94 %-100 %] 94 %    Physical Exam:  Alert, appropriate, and following commands  Breathing easily without wheeze  Hip Dressing/wound c/d/I  Bilateral calves soft, compressible, non tender  5/5 df/pf/ehl, SILT, palpable dp pulse  Fingers ecchymosis , ROM to fingers with dexterity and good cap refill, SILT  Ecchymosis to right forearm/elbow, fingers. Splint in place.    Labs/Imaging:  Results for orders placed or performed during the hospital encounter of 09/25/23 (from the past 24 hour(s))   CBC with platelets   Result Value Ref Range    WBC Count 3.9 (L) 4.0 - 11.0 10e3/uL    RBC Count 2.78 (L) 3.80 - 5.20 10e6/uL    Hemoglobin 9.3 (L) 11.7 - 15.7 g/dL    Hematocrit 28.5 (L) 35.0 - 47.0 %     (H) 78 - 100 fL    MCH 33.5 (H) 26.5 - 33.0 pg    MCHC 32.6 31.5 - 36.5 g/dL    RDW 14.3 10.0 - 15.0 %    Platelet Count 194 150 - 450 10e3/uL   Basic metabolic panel   Result Value Ref Range    Sodium 142 135 - 145 mmol/L    Potassium 3.8 3.4 - 5.3 mmol/L    Chloride 103 98 - 107 mmol/L    Carbon Dioxide (CO2) 31 (H) 22 - 29 mmol/L    Anion Gap 8 7 - 15 mmol/L    Urea Nitrogen 8.4 8.0 - 23.0 mg/dL    Creatinine 0.59 0.51 - 0.95 mg/dL    GFR Estimate >90 >60 mL/min/1.73m2    Calcium 8.3 (L) 8.8 - 10.2 mg/dL    Glucose 104 (H) 70 - 99 mg/dL         A/P - 78 year old female s/p right hip nail  and right radius ORIF      DVT proph: ASA 325mg qd  Activity: WBAT right lower extremity. May weight bear through the right forearm. Encouraged gentle ROM of fingers, icing above and below short arm splint, in addition to elevation of the right upper extremity to help with swelling in fingers.   Dressing: Keep intact.  Change if >60% saturated or peeling off. Leave short arm splint in place x 1 week. Keep splint clean, dry and intact.      Follow-up: 2 weeks post-op with Dr. Lane, and 1 week follow up with Dr. Kaur's team for removal of splint and cast placement.      Dispo: TCU on Mon 10/2. Pending PT progression and stable medically.       Katherin Jennings PA-C  [facility]

## 2023-10-01 NOTE — PLAN OF CARE
Physical Therapy Discharge Summary    Reason for therapy discharge:    Discharged to transitional care facility.    Progress towards therapy goal(s). See goals on Care Plan in [facility] electronic health record for goal details.  Goals met    Therapy recommendation(s):    Continued therapy is recommended.  Rationale/Recommendations:  TCU for ongoing rehab to return to prior level of function.
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
## 2023-10-02 ENCOUNTER — PATIENT OUTREACH (OUTPATIENT)
Dept: CARE COORDINATION | Facility: CLINIC | Age: 78
End: 2023-10-02

## 2023-10-02 ENCOUNTER — TRANSITIONAL CARE UNIT VISIT (OUTPATIENT)
Dept: GERIATRICS | Facility: CLINIC | Age: 78
End: 2023-10-02
Payer: MEDICARE

## 2023-10-02 VITALS
TEMPERATURE: 98 F | SYSTOLIC BLOOD PRESSURE: 151 MMHG | OXYGEN SATURATION: 94 % | WEIGHT: 98.1 LBS | RESPIRATION RATE: 18 BRPM | HEART RATE: 72 BPM | DIASTOLIC BLOOD PRESSURE: 63 MMHG | BODY MASS INDEX: 19.26 KG/M2 | HEIGHT: 60 IN

## 2023-10-02 DIAGNOSIS — R41.0 DELIRIUM: ICD-10-CM

## 2023-10-02 DIAGNOSIS — S52.614D CLOSED NONDISPLACED FRACTURE OF STYLOID PROCESS OF RIGHT ULNA WITH ROUTINE HEALING, SUBSEQUENT ENCOUNTER: ICD-10-CM

## 2023-10-02 DIAGNOSIS — K59.01 SLOW TRANSIT CONSTIPATION: ICD-10-CM

## 2023-10-02 DIAGNOSIS — Z98.890 S/P ORIF (OPEN REDUCTION INTERNAL FIXATION) FRACTURE: ICD-10-CM
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
## 2023-10-02 DIAGNOSIS — D64.9 CHRONIC ANEMIA: ICD-10-CM

## 2023-10-02 DIAGNOSIS — E46 MALNUTRITION, UNSPECIFIED TYPE (H): ICD-10-CM

## 2023-10-02 DIAGNOSIS — R53.81 PHYSICAL DECONDITIONING: ICD-10-CM

## 2023-10-02 DIAGNOSIS — E03.9 HYPOTHYROIDISM, UNSPECIFIED TYPE: ICD-10-CM

## 2023-10-02 DIAGNOSIS — Z87.81 S/P ORIF (OPEN REDUCTION INTERNAL FIXATION) FRACTURE: ICD-10-CM

## 2023-10-02 DIAGNOSIS — Z98.84 H/O GASTRIC BYPASS: ICD-10-CM

## 2023-10-02 DIAGNOSIS — Z86.69 HISTORY OF MIGRAINE HEADACHES: ICD-10-CM

## 2023-10-02 DIAGNOSIS — S52.501D CLOSED FRACTURE OF DISTAL END OF RIGHT RADIUS WITH ROUTINE HEALING, UNSPECIFIED FRACTURE MORPHOLOGY, SUBSEQUENT ENCOUNTER: ICD-10-CM

## 2023-10-02 DIAGNOSIS — S72.001D CLOSED FRACTURE OF RIGHT HIP WITH ROUTINE HEALING, SUBSEQUENT ENCOUNTER: Primary | ICD-10-CM

## 2023-10-02 DIAGNOSIS — F41.9 ANXIETY: ICD-10-CM

## 2023-10-02 DIAGNOSIS — F32.A DEPRESSION, UNSPECIFIED DEPRESSION TYPE: ICD-10-CM

## 2023-10-02 DIAGNOSIS — I10 ESSENTIAL HYPERTENSION: ICD-10-CM

## 2023-10-02 DIAGNOSIS — D62 ANEMIA DUE TO BLOOD LOSS, ACUTE: ICD-10-CM

## 2023-10-02 DIAGNOSIS — K21.9 GASTROESOPHAGEAL REFLUX DISEASE, UNSPECIFIED WHETHER ESOPHAGITIS PRESENT: ICD-10-CM

## 2023-10-02 LAB — SARS-COV-2 RNA RESP QL NAA+PROBE: NEGATIVE

## 2023-10-02 PROCEDURE — 86481 TB AG RESPONSE T-CELL SUSP: CPT | Performed by: NURSE PRACTITIONER

## 2023-10-02 PROCEDURE — 99310 SBSQ NF CARE HIGH MDM 45: CPT | Performed by: NURSE PRACTITIONER

## 2023-10-02 RX ORDER — ACETAMINOPHEN 500 MG
1000 TABLET ORAL 3 TIMES DAILY
Start: 2023-10-02

## 2023-10-02 RX ORDER — OXYCODONE HYDROCHLORIDE 5 MG/1
TABLET ORAL
Qty: 20 TABLET | Refills: 0 | Status: SHIPPED | OUTPATIENT
Start: 2023-10-02 | End: 2023-10-05

## 2023-10-02 RX ORDER — METHOCARBAMOL 500 MG/1
500 TABLET, FILM COATED ORAL 4 TIMES DAILY
Start: 2023-10-02 | End: 2023-10-20

## 2023-10-02 RX ORDER — POLYETHYLENE GLYCOL 3350 17 G/17G
17 POWDER, FOR SOLUTION ORAL DAILY PRN
Qty: 510 G | Refills: 0
Start: 2023-10-02 | End: 2023-11-22

## 2023-10-02 NOTE — PROGRESS NOTES
[facility] GERIATRICS    PRIMARY CARE PROVIDER AND CLINIC:  Tiago Tellez NP, [address] [facility] [address]  Chief Complaint   Patient presents with    Hospital F/U      [facility] Medical Record Number:  [id number]  Place of Service where encounter took place:  [facility]  ([facility]) [[id number]]    Debbie Boucher  is a 78 year old  (1945), admitted to the above facility from  [facility]. Hospital stay 9/25/23 through 10/1/23..   HPI:    Past medical history significant for anxiety, depression, migraines, GERD.    Summary of recent hospitalization:  Patient was hospitalized at [facility] from 9/25/2023 to 10/1/2023 for right intertrochanteric femur fracture s/p short intramedullary nail right hip on 9/26/2023 and Acute right distal radius fracture s/p ORIF of right distal radius fracture on 9/27/2023.  Patient presented to the emergency department for evaluation after a fall, she was taking out her trash and fell onto her right side.  Laboratory evaluation with unremarkable CBC and BMP on admission.  X-ray of right wrist revealed acute distal radial metaphyseal fracture with dorsal action and angulation, likely intra-articular extension, appears to be tiny ulnar styloid process fracture, surrounding soft tissue swelling.  Right femur x-ray reveals acute comminuted intertrochanteric fracture of the right femur with superior lateral displacement and coxa vara angulation, there is displaced lesser trochanteric fracture fragment, well joint alignment.  X-ray to pelvis revealed acute, limited intertrochanteric fracture of the right femur with severe lateral displacement and coxa vara angulation, displaced lesser trochanteric fracture fragment.  Head CT negative for acute findings.  Ortho consulted and patient is status post closed reduction insertion intramedullary nail right hip on 9/26/2023 and ORIF of right distal radius fracture on 9/27/2023.  Patient  with acute blood loss anemia secondary to surgery with terrance of 6.8 on 9/29/2023 and received 2 units PRBC. Discharged to TCU for physical rehabilitation and medical management.     Reviewed notes, imaging, labs from recent hospitalization.    Today patient was seen today sitting up in her wheelchair, her daughter and 1 other visitors were present today during my visit.  Patient reports pain at time of visit of 3/10.  However her daughter later came to talk to me and reported that her pain was a 10/10.  Patient denies any shortness of breath, chest pain, dizziness/lightheadedness.  She tells me that her bowels are moving about every 2 days.  She has been refusing the MiraLAX and would like it changed to as needed.  She denies dysuria/trouble urinating.  She denies urinary frequency.  She does have some increased swelling to her right foot, which family noticed to be worse today.  She does not have any pain to that ankle.  Her cognition is almost back to normal per discussion with daughter.  She is oriented x3.  She denies any recent headaches.  We discussed what to expect in the TCU.  [facility] [place] [facility]    Reviewed facility EMR including medications, recent nursing progress notes, vital signs.  Nursing staff with no concerns today    CODE STATUS/ADVANCE DIRECTIVES DISCUSSION:  Full Code  CPR/Full code   ALLERGIES:   Allergies   Allergen Reactions    Hydrochlorothiazide Hives    Contrast Dye     Iodinated Contrast Media Itching    Levofloxacin     Sulfa Antibiotics Itching     topical      PAST MEDICAL HISTORY:   Past Medical History:   Diagnosis Date    Anxiety     Depressive disorder     Hypothyroidism     Migraines     PONV (postoperative nausea and vomiting)       PAST SURGICAL HISTORY:   has a past surgical history that includes ENT surgery; appendectomy; Cholecystectomy; hernia repair; GYN surgery; orthopedic surgery; Abdomen surgery; gastric bypass (2004); Laparotomy exploratory  (1981); Open reduction internal fixation rodding intramedullary femur (Right, 9/26/2023); and Open reduction internal fixation wrist (Right, 9/27/2023).  FAMILY HISTORY: family history is not on file.  SOCIAL HISTORY:   reports that she has never smoked. She has never used smokeless tobacco.  Patient's living condition: lives alone    Post Discharge Medication Reconciliation Status:   MED REC REQUIRED  Post Medication Reconciliation Status:  Discharge medications reconciled and changed, see notes/orders       Current Outpatient Medications   Medication Sig    acetaminophen (TYLENOL) 500 MG tablet Take 2 tablets (1,000 mg) by mouth 3 times daily    aspirin (ASA) 325 MG EC tablet Take 1 tablet (325 mg) by mouth daily    buPROPion (WELLBUTRIN XL) 150 MG 24 hr tablet Take 150 mg by mouth every morning    cyanocobalamin (VITAMIN B-12) 100 MCG tablet Take 1 tablet (100 mcg) by mouth daily    FLUoxetine (PROZAC) 40 MG capsule Take 40 mg by mouth daily    folic acid (FOLVITE) 1 MG tablet Take 1 tablet (1 mg) by mouth daily    levothyroxine (SYNTHROID/LEVOTHROID) 75 MCG tablet Take 75 mcg by mouth daily    lisinopril (ZESTRIL) 20 MG tablet Take 20 mg by mouth daily    methocarbamol (ROBAXIN) 500 MG tablet Take 1 tablet (500 mg) by mouth 4 times daily    multivitamin w/minerals (THERA-VIT-M) tablet Take 1 tablet by mouth daily    omeprazole (PRILOSEC) 40 MG DR capsule Take 40 mg by mouth daily    oxyCODONE (ROXICODONE) 5 MG tablet Take 0.5 tablets (2.5 mg) by mouth 2 times daily. May also take 0.5 tablets (2.5 mg) every 4 hours as needed for moderate pain.    polyethylene glycol (MIRALAX) 17 GM/Dose powder Take 17 g by mouth daily as needed for constipation    Rizatriptan Benzoate (MAXALT PO) Take 10 mg by mouth once as needed for migraine    traZODone (DESYREL) 100 MG tablet Take 100 mg by mouth At Bedtime    vitamin D3 (CHOLECALCIFEROL) 50 mcg (2000 units) tablet Take 1 tablet (50 mcg) by mouth daily     No current  facility-administered medications for this visit.       ROS:  10 point ROS of systems including Constitutional, Eyes, Respiratory, Cardiovascular, Gastroenterology, Genitourinary, Integumentary, Musculoskeletal, Psychiatric were all negative except for pertinent positives noted in my HPI.    Vitals:  BP (!) 151/63   Pulse 72   Temp 98  F (36.7  C)   Resp 18   Ht 1.524 m (5')   Wt 44.5 kg (98 lb 1.6 oz)   SpO2 94%   BMI 19.16 kg/m    Exam:  GENERAL APPEARANCE:  Alert, frail, in NAD  HEENT: normocephalic, moist mucous membranes, nose without drainage or crusting  RESP:  respiratory effort normal, no respiratory distress, Lung sounds clear, patient is on RA  CV: auscultation of heart done, rate and rhythm regular.  ABDOMEN: + bowel sounds, soft, nontender, no grimacing or guarding with palpation.  M/S: pocket of pitting edema to right ankle/ foot collecting at the site of her shoe and overall RLE with increased generalized edema; right arm in splint- able to move fingers freely- with generalized edema present to right hand  SKIN:  Inspection and palpation of skin and subcutaneous tissue: skin warm, dry without rashes, dressing to left hip in place, bruising to right hand and right medial thigh observed today, aqucel dressing to right hip with very small amount of drainage on dressing  NEURO: cranial nerves 2-12 grossly intact and at patient's baseline; moves extremities freely  PSYCH: oriented x 3, affect and mood normal      Lab/Diagnostic data:  Labs done in SNF are in [facility]. Please refer to them using [facility]/Care Everywhere. and Recent labs in [facility] reviewed by me today.     ASSESSMENT/PLAN:  Acute right intertrochanteric femur fracture s/p short intramedullary nail right hip on 9/26/2023  Acute right distal radius fracture s/p ORIF of right distal radius fracture on 9/27/2023  Acute right ulnar styloid fracture  Fall, subsequent encounter  Pain severe at times, with increased swelling to right foot/  ankle- no pain with palpation- suspect dependent from surgery  Plan: Change tylenol to 1000 mg TID and schedule oxycodone 2.5 mg at 8AM and noon and then every 4 hours as needed. Start methocarbamol 500 mg QID.- monitor for worsening cognition. Continue aspirin 325 mg daily for DVT prophylaxis.  Keep dressing in place, change if greater than 80% saturation.  Keep splint in place and keep clean and dry. Start TG shaper to RLE on in AM off in PM for swelling Weightbearing as tolerated with platform walker.  Follow-up with Ortho.    Acute blood loss anemia  Chronic anemia  Secondary to surgery, received 2 units PRBCs for terrance of 6.8 on 9/29/2020  Baseline hemoglobin 11-12  Hemoglobin 9.9 on 10/1/2023  Plan: CBC 10/6. Monitor for s/s of bleeding    Postoperative delirium, resolved  Oriented x3, daughter reports cognition is almost back to normal  SLUMS 30/30 from TCU stay back in 4/20/2023  Plan: OCCUPATIONAL THERAPY to complete cognitive testing for safe discharge planning. Nursing to assist with cares, meals, medication assistance, activities.    Essential hypertension  BP with fair control, has some elevations likely due to pain  Plan: Continue lisinopril 20 mg daily. Monitor BP.    Migraine Headache  Denies any recent headaches  Plan: Continue rizatriptan 10 mg daily as needed.    GERD  Plan: Continue omeprazole 40 mg daily.    Hypothyroidism  TSH 3.91 on 4/29/2023  Plan: Continue levothyroxine 75 mcg daily.    Depression  Anxiety  Plan: Continue Wellbutrin 150 mg daily, fluoxetine 40 mg daily, and trazodone 100 mg at bedtime. Monitor mood and symptoms. Consider ACP referral PRN.    Malnutrition, BMI 19.16  Per TCU dietician assessment  Plan: Dietician following. Supplements per dietician. Monitor weights and po intake    Slow transit constipation  Bowels moving every other day  Plan: Discussed importance of watching bowels closely with use of oxycodone and patient still wanting miralax changed to PRN. Change  MiraLAX 17 g daily PRN.  Monitor bowels.    History of gastric bypass  Plan: Continue folic acid 1 mg daily and vitamin B12 100 mcg daily.    Physical deconditioning  Secondary to acute medical conditions as above  Plan: Encourage participation in physical therapy/occupational therapy for strengthening and deconditioning. Discharge planning per their recommendation. Social work to assist with d/c planning.                Disclaimer: This note may contain text created using speech-recognition software and may contain unintended word substitutions.         Electronically signed by:  JAYCEE Knuz CNP

## 2023-10-02 NOTE — PATIENT INSTRUCTIONS
Orders  Debbie Boucher  1945  1) Discontinue current tylenol orders  2) Start tylenol 1000 mg TID for pain.  3) Schedule oxycodone 2.5 mg at 8AM and noon and then every 4 hours as needed- do not give PRN within 4 hours of scheduled dose for pain  4) Start methocarbamol 500 mg QID for pain.- monitor for worsening cognition  5) Change MiraLAX 17 g daily PRN. Diagnosis: constipation  6) CBC 10/6. Diagnosis: anemia  7) Start TG shaper to RLE on in AM off in PM for swelling   Vanna Overton, JAYCEE CNP on 10/2/2023 at 3:04 PM

## 2023-10-02 NOTE — PROGRESS NOTES
[facility]    Background: Transitional Care Management program identified per system criteria and reviewed by [facility] team for possible outreach.    Assessment: Upon chart review, CCR Team member will not proceed with patient outreach related to this episode of Transitional Care Management program due to reason below:    [facility] TCU: Patient discharged to TCU/ARU/LTACH and is established within [facility]. Referral created for Primary Care-Care Coordination program.    Plan: Transitional Care Management episode addressed appropriately per reason noted above.      Shila Posadas  Community Health Worker  [facility]  [phone]      *Connected Care Resource Team does NOT follow patient ongoing. Referrals are identified based on internal discharge reports and the outreach is to ensure patient has an understanding of their discharge instructions.

## 2023-10-02 NOTE — LETTER
10/2/2023        RE: Debbie Boucher  [address]  [facility] [address]        [facility] GERIATRICS    PRIMARY CARE PROVIDER AND CLINIC:  Tiago Tellez NP, [address] / [facility] [address]  Chief Complaint   Patient presents with     Hospital F/U      [place] Medical Record Number:  [id number]  Place of Service where encounter took place:  [facility]  ([facility]) [[id number]]    Debbie Boucher  is a 78 year old  (1945), admitted to the above facility from  [facility]. Hospital stay 9/25/23 through 10/1/23..   HPI:    Past medical history significant for anxiety, depression, migraines, GERD.    Summary of recent hospitalization:  Patient was hospitalized at [facility] from 9/25/2023 to 10/1/2023 for right intertrochanteric femur fracture s/p short intramedullary nail right hip on 9/26/2023 and Acute right distal radius fracture s/p ORIF of right distal radius fracture on 9/27/2023.  Patient presented to the emergency department for evaluation after a fall, she was taking out her trash and fell onto her right side.  Laboratory evaluation with unremarkable CBC and BMP on admission.  X-ray of right wrist revealed acute distal radial metaphyseal fracture with dorsal action and angulation, likely intra-articular extension, appears to be tiny ulnar styloid process fracture, surrounding soft tissue swelling.  Right femur x-ray reveals acute comminuted intertrochanteric fracture of the right femur with superior lateral displacement and coxa vara angulation, there is displaced lesser trochanteric fracture fragment, well joint alignment.  X-ray to pelvis revealed acute, limited intertrochanteric fracture of the right femur with severe lateral displacement and coxa vara angulation, displaced lesser trochanteric fracture fragment.  Head CT negative for acute findings.  Ortho consulted and patient is status post closed reduction insertion intramedullary  nail right hip on 9/26/2023 and ORIF of right distal radius fracture on 9/27/2023.  Patient with acute blood loss anemia secondary to surgery with terrance of 6.8 on 9/29/2023 and received 2 units PRBC. Discharged to TCU for physical rehabilitation and medical management.     Reviewed notes, imaging, labs from recent hospitalization.    Today patient was seen today sitting up in her wheelchair, her daughter and 1 other visitors were present today during my visit.  Patient reports pain at time of visit of 3/10.  However her daughter later came to talk to me and reported that her pain was a 10/10.  Patient denies any shortness of breath, chest pain, dizziness/lightheadedness.  She tells me that her bowels are moving about every 2 days.  She has been refusing the MiraLAX and would like it changed to as needed.  She denies dysuria/trouble urinating.  She denies urinary frequency.  She does have some increased swelling to her right foot, which family noticed to be worse today.  She does not have any pain to that ankle.  Her cognition is almost back to normal per discussion with daughter.  She is oriented x3.  She denies any recent headaches.  We discussed what to expect in the TCU.  [facility] [place] [facility]    Reviewed facility EMR including medications, recent nursing progress notes, vital signs.  Nursing staff with no concerns today    CODE STATUS/ADVANCE DIRECTIVES DISCUSSION:  Full Code  CPR/Full code   ALLERGIES:   Allergies   Allergen Reactions     Hydrochlorothiazide Hives     Contrast Dye      Iodinated Contrast Media Itching     Levofloxacin      Sulfa Antibiotics Itching     topical      PAST MEDICAL HISTORY:   Past Medical History:   Diagnosis Date     Anxiety      Depressive disorder      Hypothyroidism      Migraines      PONV (postoperative nausea and vomiting)       PAST SURGICAL HISTORY:   has a past surgical history that includes ENT surgery; appendectomy; Cholecystectomy; hernia repair;  GYN surgery; orthopedic surgery; Abdomen surgery; gastric bypass (2004); Laparotomy exploratory (1981); Open reduction internal fixation rodding intramedullary femur (Right, 9/26/2023); and Open reduction internal fixation wrist (Right, 9/27/2023).  FAMILY HISTORY: family history is not on file.  SOCIAL HISTORY:   reports that she has never smoked. She has never used smokeless tobacco.  Patient's living condition: lives alone    Post Discharge Medication Reconciliation Status:   MED REC REQUIRED  Post Medication Reconciliation Status:  Discharge medications reconciled and changed, see notes/orders       Current Outpatient Medications   Medication Sig     acetaminophen (TYLENOL) 500 MG tablet Take 2 tablets (1,000 mg) by mouth 3 times daily     aspirin (ASA) 325 MG EC tablet Take 1 tablet (325 mg) by mouth daily     buPROPion (WELLBUTRIN XL) 150 MG 24 hr tablet Take 150 mg by mouth every morning     cyanocobalamin (VITAMIN B-12) 100 MCG tablet Take 1 tablet (100 mcg) by mouth daily     FLUoxetine (PROZAC) 40 MG capsule Take 40 mg by mouth daily     folic acid (FOLVITE) 1 MG tablet Take 1 tablet (1 mg) by mouth daily     levothyroxine (SYNTHROID/LEVOTHROID) 75 MCG tablet Take 75 mcg by mouth daily     lisinopril (ZESTRIL) 20 MG tablet Take 20 mg by mouth daily     methocarbamol (ROBAXIN) 500 MG tablet Take 1 tablet (500 mg) by mouth 4 times daily     multivitamin w/minerals (THERA-VIT-M) tablet Take 1 tablet by mouth daily     omeprazole (PRILOSEC) 40 MG DR capsule Take 40 mg by mouth daily     oxyCODONE (ROXICODONE) 5 MG tablet Take 0.5 tablets (2.5 mg) by mouth 2 times daily. May also take 0.5 tablets (2.5 mg) every 4 hours as needed for moderate pain.     polyethylene glycol (MIRALAX) 17 GM/Dose powder Take 17 g by mouth daily as needed for constipation     Rizatriptan Benzoate (MAXALT PO) Take 10 mg by mouth once as needed for migraine     traZODone (DESYREL) 100 MG tablet Take 100 mg by mouth At Bedtime      vitamin D3 (CHOLECALCIFEROL) 50 mcg (2000 units) tablet Take 1 tablet (50 mcg) by mouth daily     No current facility-administered medications for this visit.       ROS:  10 point ROS of systems including Constitutional, Eyes, Respiratory, Cardiovascular, Gastroenterology, Genitourinary, Integumentary, Musculoskeletal, Psychiatric were all negative except for pertinent positives noted in my HPI.    Vitals:  BP (!) 151/63   Pulse 72   Temp 98  F (36.7  C)   Resp 18   Ht 1.524 m (5')   Wt 44.5 kg (98 lb 1.6 oz)   SpO2 94%   BMI 19.16 kg/m    Exam:  GENERAL APPEARANCE:  Alert, frail, in NAD  HEENT: normocephalic, moist mucous membranes, nose without drainage or crusting  RESP:  respiratory effort normal, no respiratory distress, Lung sounds clear, patient is on RA  CV: auscultation of heart done, rate and rhythm regular.  ABDOMEN: + bowel sounds, soft, nontender, no grimacing or guarding with palpation.  M/S: pocket of pitting edema to right ankle/ foot collecting at the site of her shoe and overall RLE with increased generalized edema; right arm in splint- able to move fingers freely- with generalized edema present to right hand  SKIN:  Inspection and palpation of skin and subcutaneous tissue: skin warm, dry without rashes, dressing to left hip in place, bruising to right hand and right medial thigh observed today, aqucel dressing to right hip with very small amount of drainage on dressing  NEURO: cranial nerves 2-12 grossly intact and at patient's baseline; moves extremities freely  PSYCH: oriented x 3, affect and mood normal      Lab/Diagnostic data:  [facility]. Please refer to them using [facility]/Care Everywhere. and Recent labs in [facility] reviewed by me today.     ASSESSMENT/PLAN:  Acute right intertrochanteric femur fracture s/p short intramedullary nail right hip on 9/26/2023  Acute right distal radius fracture s/p ORIF of right distal radius fracture on 9/27/2023  Acute right ulnar  styloid fracture  Fall, subsequent encounter  Pain severe at times, with increased swelling to right foot/ ankle- no pain with palpation- suspect dependent from surgery  Plan: Change tylenol to 1000 mg TID and schedule oxycodone 2.5 mg at 8AM and noon and then every 4 hours as needed. Start methocarbamol 500 mg QID.- monitor for worsening cognition. Continue aspirin 325 mg daily for DVT prophylaxis.  Keep dressing in place, change if greater than 80% saturation.  Keep splint in place and keep clean and dry. Start TG shaper to RLE on in AM off in PM for swelling Weightbearing as tolerated with platform walker.  Follow-up with Ortho.    Acute blood loss anemia  Chronic anemia  Secondary to surgery, received 2 units PRBCs for terrance of 6.8 on 9/29/2020  Baseline hemoglobin 11-12  Hemoglobin 9.9 on 10/1/2023  Plan: CBC 10/6. Monitor for s/s of bleeding    Postoperative delirium, resolved  Oriented x3, daughter reports cognition is almost back to normal  SLUMS 30/30 from TCU stay back in 4/20/2023  Plan: OCCUPATIONAL THERAPY to complete cognitive testing for safe discharge planning. Nursing to assist with cares, meals, medication assistance, activities.    Essential hypertension  BP with fair control, has some elevations likely due to pain  Plan: Continue lisinopril 20 mg daily. Monitor BP.    Migraine Headache  Denies any recent headaches  Plan: Continue rizatriptan 10 mg daily as needed.    GERD  Plan: Continue omeprazole 40 mg daily.    Hypothyroidism  TSH 3.91 on 4/29/2023  Plan: Continue levothyroxine 75 mcg daily.    Depression  Anxiety  Plan: Continue Wellbutrin 150 mg daily, fluoxetine 40 mg daily, and trazodone 100 mg at bedtime. Monitor mood and symptoms. Consider ACP referral PRN.    Malnutrition, BMI 19.16  Per TCU dietician assessment  Plan: Dietician following. Supplements per dietician. Monitor weights and po intake    Slow transit constipation  Bowels moving every other day  Plan: Discussed importance of  watching bowels closely with use of oxycodone and patient still wanting miralax changed to PRN. Change MiraLAX 17 g daily PRN.  Monitor bowels.    History of gastric bypass  Plan: Continue folic acid 1 mg daily and vitamin B12 100 mcg daily.    Physical deconditioning  Secondary to acute medical conditions as above  Plan: Encourage participation in physical therapy/occupational therapy for strengthening and deconditioning. Discharge planning per their recommendation. Social work to assist with d/c planning.                Disclaimer: This note may contain text created using speech-recognition software and may contain unintended word substitutions.         Electronically signed by:  JAYCEE Kunz CNP                   Sincerely,        JAYCEE Kunz CNP

## 2023-10-02 NOTE — PROGRESS NOTES
Clinic Care Coordination Contact  Care Team Conversations    Situation: [facility] following patient through TCU care progression.    Background: Patient was hospitalized at [facility] from 9/25/2023 to 10/1/2023 with diagnosis of Mechanical fall, Acute right intertrochanteric femur fracture, Acute right distal radius fracture, Acute right ulnar styloid fracture, Acute blood loss anemia, and postop delirium.    Assessment: Patient was discharged to [facility] on 10/1/2023.     Plan/Recommendations: [facility] will send TCU Care Team Care Management hand in communication and request involvement in discharge planning and coordination of care.      RIKKI Bass/[facility]  Social Work Care Coordinator  [facility] [place], [place], and [place]  Phone: [phone]

## 2023-10-03 ENCOUNTER — LAB REQUISITION (OUTPATIENT)
Dept: LAB | Facility: CLINIC | Age: 78
End: 2023-10-03
Payer: MEDICARE

## 2023-10-03 DIAGNOSIS — U07.1 COVID-19: ICD-10-CM

## 2023-10-03 LAB
GAMMA INTERFERON BACKGROUND BLD IA-ACNC: 0.01 IU/ML
M TB IFN-G BLD-IMP: NEGATIVE
M TB IFN-G CD4+ BCKGRND COR BLD-ACNC: 3.45 IU/ML
MITOGEN IGNF BCKGRD COR BLD-ACNC: 0.02 IU/ML
MITOGEN IGNF BCKGRD COR BLD-ACNC: 0.03 IU/ML
QUANTIFERON MITOGEN: 3.46 IU/ML
QUANTIFERON NIL TUBE: 0.01 IU/ML
QUANTIFERON TB1 TUBE: 0.03 IU/ML
QUANTIFERON TB2 TUBE: 0.04

## 2023-10-03 PROCEDURE — 87635 SARS-COV-2 COVID-19 AMP PRB: CPT | Performed by: NURSE PRACTITIONER

## 2023-10-04 VITALS
HEART RATE: 70 BPM | BODY MASS INDEX: 18.38 KG/M2 | DIASTOLIC BLOOD PRESSURE: 77 MMHG | HEIGHT: 60 IN | WEIGHT: 93.6 LBS | TEMPERATURE: 98.2 F | RESPIRATION RATE: 20 BRPM | OXYGEN SATURATION: 93 % | SYSTOLIC BLOOD PRESSURE: 143 MMHG

## 2023-10-04 LAB — SARS-COV-2 RNA RESP QL NAA+PROBE: NEGATIVE
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
## 2023-10-05 ENCOUNTER — TRANSITIONAL CARE UNIT VISIT (OUTPATIENT)
Dept: GERIATRICS | Facility: CLINIC | Age: 78
End: 2023-10-05
Payer: MEDICARE

## 2023-10-05 ENCOUNTER — LAB REQUISITION (OUTPATIENT)
Dept: LAB | Facility: CLINIC | Age: 78
End: 2023-10-05
Payer: MEDICARE

## 2023-10-05 ENCOUNTER — TELEPHONE (OUTPATIENT)
Dept: GERIATRICS | Facility: CLINIC | Age: 78
End: 2023-10-05

## 2023-10-05 DIAGNOSIS — K59.01 SLOW TRANSIT CONSTIPATION: ICD-10-CM

## 2023-10-05 DIAGNOSIS — R41.0 DELIRIUM: ICD-10-CM

## 2023-10-05 DIAGNOSIS — I10 ESSENTIAL HYPERTENSION: ICD-10-CM

## 2023-10-05 DIAGNOSIS — K21.9 GASTROESOPHAGEAL REFLUX DISEASE, UNSPECIFIED WHETHER ESOPHAGITIS PRESENT: ICD-10-CM

## 2023-10-05 DIAGNOSIS — Z87.81 S/P ORIF (OPEN REDUCTION INTERNAL FIXATION) FRACTURE: ICD-10-CM
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
## 2023-10-05 DIAGNOSIS — R71.8 MICROCYTOSIS: ICD-10-CM

## 2023-10-05 DIAGNOSIS — S52.501D CLOSED FRACTURE OF DISTAL END OF RIGHT RADIUS WITH ROUTINE HEALING, UNSPECIFIED FRACTURE MORPHOLOGY, SUBSEQUENT ENCOUNTER: ICD-10-CM

## 2023-10-05 DIAGNOSIS — Z98.84 H/O GASTRIC BYPASS: ICD-10-CM

## 2023-10-05 DIAGNOSIS — E03.9 HYPOTHYROIDISM, UNSPECIFIED TYPE: ICD-10-CM

## 2023-10-05 DIAGNOSIS — F41.9 ANXIETY: ICD-10-CM

## 2023-10-05 DIAGNOSIS — F32.A DEPRESSION, UNSPECIFIED DEPRESSION TYPE: ICD-10-CM

## 2023-10-05 DIAGNOSIS — S72.001D CLOSED FRACTURE OF RIGHT HIP WITH ROUTINE HEALING, SUBSEQUENT ENCOUNTER: ICD-10-CM

## 2023-10-05 DIAGNOSIS — S52.614D CLOSED NONDISPLACED FRACTURE OF STYLOID PROCESS OF RIGHT ULNA WITH ROUTINE HEALING, SUBSEQUENT ENCOUNTER: ICD-10-CM

## 2023-10-05 DIAGNOSIS — D62 ACUTE BLOOD LOSS ANEMIA: ICD-10-CM

## 2023-10-05 DIAGNOSIS — S72.001D CLOSED FRACTURE OF RIGHT HIP WITH ROUTINE HEALING, SUBSEQUENT ENCOUNTER: Primary | ICD-10-CM

## 2023-10-05 DIAGNOSIS — D64.9 ANEMIA, UNSPECIFIED: ICD-10-CM

## 2023-10-05 DIAGNOSIS — Z98.890 S/P ORIF (OPEN REDUCTION INTERNAL FIXATION) FRACTURE: ICD-10-CM

## 2023-10-05 DIAGNOSIS — E46 MALNUTRITION, UNSPECIFIED TYPE (H): ICD-10-CM

## 2023-10-05 PROCEDURE — 99305 1ST NF CARE MODERATE MDM 35: CPT | Performed by: INTERNAL MEDICINE

## 2023-10-05 PROCEDURE — 87635 SARS-COV-2 COVID-19 AMP PRB: CPT | Performed by: NURSE PRACTITIONER

## 2023-10-05 RX ORDER — OXYCODONE HYDROCHLORIDE 5 MG/1
2.5 TABLET ORAL EVERY 6 HOURS
Qty: 20 TABLET | Refills: 0 | Status: SHIPPED | OUTPATIENT
Start: 2023-10-05 | End: 2023-10-20

## 2023-10-05 RX ORDER — OXYCODONE HCL 10 MG/1
10 TABLET, FILM COATED, EXTENDED RELEASE ORAL EVERY 12 HOURS
COMMUNITY
End: 2023-10-05

## 2023-10-05 RX ORDER — OXYCODONE HYDROCHLORIDE 5 MG/1
2.5 TABLET ORAL EVERY 6 HOURS
COMMUNITY
End: 2023-10-05

## 2023-10-05 RX ORDER — OXYCODONE HYDROCHLORIDE 5 MG/1
2.5 TABLET ORAL EVERY 4 HOURS PRN
Qty: 30 TABLET | Refills: 0 | Status: SHIPPED | OUTPATIENT
Start: 2023-10-05 | End: 2023-10-25

## 2023-10-05 RX ORDER — OXYCODONE HYDROCHLORIDE 5 MG/1
2.5 TABLET ORAL EVERY 4 HOURS PRN
COMMUNITY
End: 2023-10-05

## 2023-10-05 NOTE — TELEPHONE ENCOUNTER
Spoke to patient's son today regarding plan for pain management, as well as changes made today. Answered his questions and directed him to nurse manager to address the remaining questions that I was unable to.  JAYCEE Kunz CNP on 10/5/2023 at 10:04 AM
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
## 2023-10-05 NOTE — PATIENT INSTRUCTIONS
Orders for Debbie Boucher (1945), MR# [id number]:    1) Change oxycodone order to 2.5 mg p.o. every 6 hours scheduled as well as 2.5 mg p.o. every 4 hours as needed for moderate to severe pain, # 20 tabs, CARMELA: [id number]  2) Narcan nasal spray, 4 mg into nostril, alternating nostrils as needed for opiate reversal every 2-3 minutes    Roxy Puga MD  [facility]

## 2023-10-06 ENCOUNTER — LAB REQUISITION (OUTPATIENT)
Dept: LAB | Facility: CLINIC | Age: 78
End: 2023-10-06
Payer: MEDICARE

## 2023-10-06 DIAGNOSIS — U07.1 COVID-19: ICD-10-CM

## 2023-10-06 LAB
ERYTHROCYTE [DISTWIDTH] IN BLOOD BY AUTOMATED COUNT: 15 % (ref 10–15)
HCT VFR BLD AUTO: 32.2 % (ref 35–47)
HGB BLD-MCNC: 10.4 G/DL (ref 11.7–15.7)
MCH RBC QN AUTO: 34.6 PG (ref 26.5–33)
MCHC RBC AUTO-ENTMCNC: 32.3 G/DL (ref 31.5–36.5)
MCV RBC AUTO: 107 FL (ref 78–100)
PLATELET # BLD AUTO: 452 10E3/UL (ref 150–450)
RBC # BLD AUTO: 3.01 10E6/UL (ref 3.8–5.2)
WBC # BLD AUTO: 5 10E3/UL (ref 4–11)

## 2023-10-06 PROCEDURE — P9603 ONE-WAY ALLOW PRORATED MILES: HCPCS | Performed by: NURSE PRACTITIONER

## 2023-10-06 PROCEDURE — 36415 COLL VENOUS BLD VENIPUNCTURE: CPT | Performed by: NURSE PRACTITIONER

## 2023-10-06 PROCEDURE — 85027 COMPLETE CBC AUTOMATED: CPT | Performed by: NURSE PRACTITIONER

## 2023-10-07 LAB — SARS-COV-2 RNA RESP QL NAA+PROBE: NEGATIVE

## 2023-10-09 ENCOUNTER — LAB REQUISITION (OUTPATIENT)
Dept: LAB | Facility: CLINIC | Age: 78
End: 2023-10-09
Payer: MEDICARE

## 2023-10-09 ENCOUNTER — TRANSITIONAL CARE UNIT VISIT (OUTPATIENT)
Dept: GERIATRICS | Facility: CLINIC | Age: 78
End: 2023-10-09
Payer: MEDICARE

## 2023-10-09 VITALS
HEIGHT: 60 IN | RESPIRATION RATE: 18 BRPM | HEART RATE: 76 BPM | BODY MASS INDEX: 18.38 KG/M2 | SYSTOLIC BLOOD PRESSURE: 165 MMHG | OXYGEN SATURATION: 94 % | TEMPERATURE: 98.8 F | WEIGHT: 93.6 LBS | DIASTOLIC BLOOD PRESSURE: 76 MMHG

## 2023-10-09 DIAGNOSIS — S72.001D CLOSED FRACTURE OF RIGHT HIP WITH ROUTINE HEALING, SUBSEQUENT ENCOUNTER: Primary | ICD-10-CM

## 2023-10-09 DIAGNOSIS — U07.1 COVID-19: ICD-10-CM

## 2023-10-09 DIAGNOSIS — S52.501D CLOSED FRACTURE OF DISTAL END OF RIGHT RADIUS WITH ROUTINE HEALING, UNSPECIFIED FRACTURE MORPHOLOGY, SUBSEQUENT ENCOUNTER: ICD-10-CM

## 2023-10-09 DIAGNOSIS — R53.81 PHYSICAL DECONDITIONING: ICD-10-CM
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
## 2023-10-09 DIAGNOSIS — I10 ESSENTIAL HYPERTENSION: ICD-10-CM

## 2023-10-09 DIAGNOSIS — S52.614D CLOSED NONDISPLACED FRACTURE OF STYLOID PROCESS OF RIGHT ULNA WITH ROUTINE HEALING, SUBSEQUENT ENCOUNTER: ICD-10-CM

## 2023-10-09 PROCEDURE — 87635 SARS-COV-2 COVID-19 AMP PRB: CPT | Performed by: NURSE PRACTITIONER

## 2023-10-09 PROCEDURE — 99309 SBSQ NF CARE MODERATE MDM 30: CPT | Performed by: INTERNAL MEDICINE

## 2023-10-09 RX ORDER — IBUPROFEN 200 MG
1 CAPSULE ORAL 2 TIMES DAILY
COMMUNITY

## 2023-10-09 RX ORDER — HYDRALAZINE HYDROCHLORIDE 25 MG/1
25 TABLET, FILM COATED ORAL 2 TIMES DAILY PRN
COMMUNITY
End: 2023-10-20

## 2023-10-09 RX ORDER — OXYCODONE HYDROCHLORIDE 5 MG/1
2.5 TABLET ORAL ONCE
Qty: 1 TABLET | Refills: 0 | Status: SHIPPED | OUTPATIENT
Start: 2023-10-09 | End: 2023-10-09

## 2023-10-09 NOTE — LETTER
10/9/2023        RE: Debbie Boucher  [id number] [facility] [id number]        [facility] GERIATRICS  TCU Episodic Visit   October 9, 2023      Chief Complaint   Patient presents with     RECHECK       HPI:    Debbie Boucher is a 78 year old  (1945), who is being seen today [facility] doing [facility]hab after a hospitalization for a fall with subsequent R intertrochanteric femur fracture and R radius and ulnar fractures.     Today, Ms Boucher is seen in her room sitting in a wheelchair. Continues to have pain, mostly in her R leg. The R wrist hasn't been bothersome. She sees ortho today at 1030. She is worried about pain control going to that appointment - I wrote for a one time oxy 2.5 to take prior (she will hit 4 hours for the PRN during the appointment). No chest pain. No dyspnea. No belly pain. No diarrhea or constipation - reports BM yesterday. No acute concerns today per nursing. She is working with therapies.       ALLERGIES: Hydrochlorothiazide, Contrast dye, Iodinated contrast media, Levofloxacin, and Sulfa antibiotics    Past Medical, Surgical, Family and Social History reviewed and updated in EPIC.    MEDICATIONS  Current Outpatient Medications   Medication Sig Dispense Refill     acetaminophen (TYLENOL) 500 MG tablet Take 2 tablets (1,000 mg) by mouth 3 times daily       aspirin (ASA) 325 MG EC tablet Take 1 tablet (325 mg) by mouth daily 30 tablet 0     buPROPion (WELLBUTRIN XL) 150 MG 24 hr tablet Take 150 mg by mouth every morning       calcium citrate (CITRACAL) 950 (200 Ca) MG tablet Take 1 tablet by mouth 2 times daily       cyanocobalamin (VITAMIN B-12) 100 MCG tablet Take 1 tablet (100 mcg) by mouth daily       FLUoxetine (PROZAC) 40 MG capsule Take 40 mg by mouth daily       folic acid (FOLVITE) 1 MG tablet Take 1 tablet (1 mg) by mouth daily       hydrALAZINE (APRESOLINE) 25 MG tablet Take 25 mg by mouth 2 times daily as  needed (SBP >170)       levothyroxine (SYNTHROID/LEVOTHROID) 75 MCG tablet Take 75 mcg by mouth daily       lisinopril (ZESTRIL) 20 MG tablet Take 20 mg by mouth daily Hold for SBP <110       methocarbamol (ROBAXIN) 500 MG tablet Take 1 tablet (500 mg) by mouth 4 times daily       multivitamin w/minerals (THERA-VIT-M) tablet Take 1 tablet by mouth daily       naloxone (NARCAN) 4 MG/0.1ML nasal spray Spray 4 mg into one nostril alternating nostrils as needed for opioid reversal every 2-3 minutes until assistance arrives       omeprazole (PRILOSEC) 40 MG DR capsule Take 40 mg by mouth daily       oxyCODONE (ROXICODONE) 5 MG tablet Take 0.5 tablets (2.5 mg) by mouth every 6 hours 20 tablet 0     oxyCODONE (ROXICODONE) 5 MG tablet Take 0.5 tablets (2.5 mg) by mouth every 4 hours as needed for severe pain or moderate to severe pain 30 tablet 0     polyethylene glycol (MIRALAX) 17 GM/Dose powder Take 17 g by mouth daily as needed for constipation 510 g 0     Rizatriptan Benzoate (MAXALT PO) Take 10 mg by mouth once as needed for migraine       traZODone (DESYREL) 100 MG tablet Take 100 mg by mouth at bedtime May repeat additional 100 mg for persistent insomnia (max 200 mg/night)       vitamin D3 (CHOLECALCIFEROL) 50 mcg (2000 units) tablet Take 1 tablet (50 mcg) by mouth daily       Medications reviewed:  Medications reconciled to facility chart and changes were made to reflect current medications as identified as above med list. Below are the changes that were made:   Medications stopped since last EPIC medication reconciliation:   There are no discontinued medications.  Medications started since last [facility] medication reconciliation:  Orders Placed This Encounter   Medications     calcium citrate (CITRACAL) 950 (200 Ca) MG tablet     Sig: Take 1 tablet by mouth 2 times daily     hydrALAZINE (APRESOLINE) 25 MG tablet     Sig: Take 25 mg by mouth 2 times daily as needed (SBP >170)       REVIEW OF SYSTEMS:  4 point ROS neg  other than the symptoms noted above in the HPI.    PHYSICAL EXAM:  BP (!) 165/76   Pulse 76   Temp 98.8  F (37.1  C)   Resp 18   Ht 1.524 m (5')   Wt 42.5 kg (93 lb 9.6 oz)   SpO2 94%   BMI 18.28 kg/m    Gen: sitting in wheelchair, alert, cooperative and in no acute distress  Card: RRR, S1, S2, no murmurs  Resp: lungs clear to auscultation bilaterally, moving good air   Ext: no LE edema; RUE in split that is quite loose - good ROM of fingers   Neuro: CX II-XII grossly in tact; ROM in all four extremities grossly in tact  Psych: alert and oriented to self and general situation     LABS & IMAGING  Recent Labs and Imaging:  Reviewed as per Epic    ASSESSMENT/PLAN:    Right Intertrochanteric Femur Fracture s/p IM Nail (9/26/23)  Right Distal Radius Fracture s/p ORIF (9/27/23)  Right Ulnar Styloid Fracture  Secondary to a fall. Pain control is still not ideal in her R leg.   -- APAP 1000 mg TID, methocarbamol 500 mg QID, oxycodone 2.5 mg q6h scheduled and q4h PRN   -- she is not ready to stop the schedule oxy today - re-eval after ortho appt   -- unclear how much is anticipatory pain?   -- DVT ppx with  mg per ortho  -- PT/OT  -- follow up with ortho today as scheduled     HTN  SBPs 140s-160s in setting of pain  -- lisinopril 20 mg daily   -- hydralazine 25 mg TID PRN for SBP >170  -- follow BPs and adjust medications as needed    Fall  Physical Deconditioning  In setting of hospitalization and underlying medical conditions  -- ongoing PT/OT    Electronically signed by  Liberty Liu MD                      Sincerely,        Liberty Liu MD

## 2023-10-09 NOTE — PROGRESS NOTES
[facility] GERIATRICS  TCU Episodic Visit   October 9, 2023      Chief Complaint   Patient presents with    RECHECK       HPI:    Debbie Boucher is a 78 year old  (1945), who is being seen today for an episodic care visit at [facility] TCU where she is doing rehab after a hospitalization for a fall with subsequent R intertrochanteric femur fracture and R radius and ulnar fractures.     Today, Ms Boucher is seen in her room sitting in a wheelchair. Continues to have pain, mostly in her R leg. The R wrist hasn't been bothersome. She sees ortho today at 1030. She is worried about pain control going to that appointment - I wrote for a one time oxy 2.5 to take prior (she will hit 4 hours for the PRN during the appointment). No chest pain. No dyspnea. No belly pain. No diarrhea or constipation - reports BM yesterday. No acute concerns today per nursing. She is working with therapies.       ALLERGIES: Hydrochlorothiazide, Contrast dye, Iodinated contrast media, Levofloxacin, and Sulfa antibiotics    Past Medical, Surgical, Family and Social History reviewed and updated in EPIC.    MEDICATIONS  Current Outpatient Medications   Medication Sig Dispense Refill    acetaminophen (TYLENOL) 500 MG tablet Take 2 tablets (1,000 mg) by mouth 3 times daily      aspirin (ASA) 325 MG EC tablet Take 1 tablet (325 mg) by mouth daily 30 tablet 0    buPROPion (WELLBUTRIN XL) 150 MG 24 hr tablet Take 150 mg by mouth every morning      calcium citrate (CITRACAL) 950 (200 Ca) MG tablet Take 1 tablet by mouth 2 times daily      cyanocobalamin (VITAMIN B-12) 100 MCG tablet Take 1 tablet (100 mcg) by mouth daily      FLUoxetine (PROZAC) 40 MG capsule Take 40 mg by mouth daily      folic acid (FOLVITE) 1 MG tablet Take 1 tablet (1 mg) by mouth daily      hydrALAZINE (APRESOLINE) 25 MG tablet Take 25 mg by mouth 2 times daily as needed (SBP >170)      levothyroxine (SYNTHROID/LEVOTHROID) 75 MCG tablet Take 75 mcg by mouth daily       lisinopril (ZESTRIL) 20 MG tablet Take 20 mg by mouth daily Hold for SBP <110      methocarbamol (ROBAXIN) 500 MG tablet Take 1 tablet (500 mg) by mouth 4 times daily      multivitamin w/minerals (THERA-VIT-M) tablet Take 1 tablet by mouth daily      naloxone (NARCAN) 4 MG/0.1ML nasal spray Spray 4 mg into one nostril alternating nostrils as needed for opioid reversal every 2-3 minutes until assistance arrives      omeprazole (PRILOSEC) 40 MG DR capsule Take 40 mg by mouth daily      oxyCODONE (ROXICODONE) 5 MG tablet Take 0.5 tablets (2.5 mg) by mouth every 6 hours 20 tablet 0    oxyCODONE (ROXICODONE) 5 MG tablet Take 0.5 tablets (2.5 mg) by mouth every 4 hours as needed for severe pain or moderate to severe pain 30 tablet 0    polyethylene glycol (MIRALAX) 17 GM/Dose powder Take 17 g by mouth daily as needed for constipation 510 g 0    Rizatriptan Benzoate (MAXALT PO) Take 10 mg by mouth once as needed for migraine      traZODone (DESYREL) 100 MG tablet Take 100 mg by mouth at bedtime May repeat additional 100 mg for persistent insomnia (max 200 mg/night)      vitamin D3 (CHOLECALCIFEROL) 50 mcg (2000 units) tablet Take 1 tablet (50 mcg) by mouth daily       Medications reviewed:  Medications reconciled to facility chart and changes were made to reflect current medications as identified as above med list. Below are the changes that were made:   Medications stopped since last EPIC medication reconciliation:   There are no discontinued medications.  Medications started since last [facility] medication reconciliation:  Orders Placed This Encounter   Medications    calcium citrate (CITRACAL) 950 (200 Ca) MG tablet     Sig: Take 1 tablet by mouth 2 times daily    hydrALAZINE (APRESOLINE) 25 MG tablet     Sig: Take 25 mg by mouth 2 times daily as needed (SBP >170)       REVIEW OF SYSTEMS:  4 point ROS neg other than the symptoms noted above in the HPI.    PHYSICAL EXAM:  BP (!) 165/76   Pulse 76   Temp 98.8  F (37.1   C)   Resp 18   Ht 1.524 m (5')   Wt 42.5 kg (93 lb 9.6 oz)   SpO2 94%   BMI 18.28 kg/m    Gen: sitting in wheelchair, alert, cooperative and in no acute distress  Card: RRR, S1, S2, no murmurs  Resp: lungs clear to auscultation bilaterally, moving good air   Ext: no LE edema; RUE in split that is quite loose - good ROM of fingers   Neuro: CX II-XII grossly in tact; ROM in all four extremities grossly in tact  Psych: alert and oriented to self and general situation     LABS & IMAGING  Recent Labs and Imaging:  Reviewed as per Epic    ASSESSMENT/PLAN:    Right Intertrochanteric Femur Fracture s/p IM Nail (9/26/23)  Right Distal Radius Fracture s/p ORIF (9/27/23)  Right Ulnar Styloid Fracture  Secondary to a fall. Pain control is still not ideal in her R leg.   -- APAP 1000 mg TID, methocarbamol 500 mg QID, oxycodone 2.5 mg q6h scheduled and q4h PRN   -- she is not ready to stop the schedule oxy today - re-eval after ortho appt   -- unclear how much is anticipatory pain?   -- DVT ppx with  mg per ortho  -- PT/OT  -- follow up with ortho today as scheduled     HTN  SBPs 140s-160s in setting of pain  -- lisinopril 20 mg daily   -- hydralazine 25 mg TID PRN for SBP >170  -- follow BPs and adjust medications as needed    Fall  Physical Deconditioning  In setting of hospitalization and underlying medical conditions  -- ongoing PT/OT    Electronically signed by  Liberty Liu MD

## 2023-10-10 ENCOUNTER — PATIENT OUTREACH (OUTPATIENT)
Dept: CARE COORDINATION | Facility: CLINIC | Age: 78
End: 2023-10-10
Payer: MEDICARE

## 2023-10-10 LAB — SARS-COV-2 RNA RESP QL NAA+PROBE: NEGATIVE

## 2023-10-10 NOTE — PROGRESS NOTES
Clinic Care Coordination Contact  Care Team Conversations    [facility] received email invite from TCU SWAlicia, regarding patients Care Conference scheduled for 10/17/2023 at 11:30am. [facility] accepted invite and blocked schedule in order to attend Care Conference via [employer]. [facility] will attend care conference and collaborate with TCU staff on patient's discharge plans/needs.    RIKKI Bass/[facility]  Social Work Care Coordinator  [facility], [place], and [place]  Phone: [phone]

## 2023-10-16 ENCOUNTER — LAB REQUISITION (OUTPATIENT)
Dept: LAB | Facility: CLINIC | Age: 78
End: 2023-10-16
Payer: MEDICARE

## 2023-10-16 DIAGNOSIS — U07.1 COVID-19: ICD-10-CM

## 2023-10-16 PROCEDURE — 87635 SARS-COV-2 COVID-19 AMP PRB: CPT | Performed by: NURSE PRACTITIONER

## 2023-10-17 ENCOUNTER — PATIENT OUTREACH (OUTPATIENT)
Dept: CARE COORDINATION | Facility: CLINIC | Age: 78
End: 2023-10-17
Payer: MEDICARE

## 2023-10-17 LAB — SARS-COV-2 RNA RESP QL NAA+PROBE: NEGATIVE

## 2023-10-17 ASSESSMENT — ACTIVITIES OF DAILY LIVING (ADL): DEPENDENT_IADLS:: MEDICATION MANAGEMENT

## 2023-10-17 NOTE — PROGRESS NOTES
Clinic Care Coordination Contact  Care Conference    Care Coordinator attended the Care Conference via [employer] Teams today at 11:30am. TCU SW, RN, PT/OT, patient and family (son/daughter) also in attendance. Per report patient is progressing well with therapies, however, continues to work on balance. Patient likely to discharge home late next week/weekend with homecare services and support from family. Per report patient has a walker in home, receives meals on wheels, and has assistance with medication management by daughter. Per TCU OT/PT patient is safe to return home alone with weekly checks, however, patient/family voiced patient may initially discharge home with daughter. Per report, daughter will be on vacation October 27-29th, therefore, if discharge is prior patient will discharge home alone. Patient/family shared they are in search of a new PCP and requested recommendations. [facility] reviewed resources for patient/family to search online for [facility] providers that are accepting new patients as well as Medicare.gov. Patient/family expressed understanding. No further CC needs identified at this time. Upon discharge, patient request writer contact daughterJen, for post-TCU discharge assessment.     Patient enrolled in Ambulatory Care Coordination No    Plan:  Care Coordinator will await notification from facility staff informing SW Care Coordinator of patient's final discharge plans/needs. SW Care Coordinator will review chart and outreach to facility staff as needed.    RIKKI Bass/[facility]  Social Work Care Coordinator  [facility], and [place]  Phone: [phone]

## 2023-10-18 ENCOUNTER — TRANSITIONAL CARE UNIT VISIT (OUTPATIENT)
Dept: GERIATRICS | Facility: CLINIC | Age: 78
End: 2023-10-18
Payer: MEDICARE

## 2023-10-18 VITALS
WEIGHT: 88.8 LBS | RESPIRATION RATE: 16 BRPM | HEIGHT: 60 IN | TEMPERATURE: 97.7 F | OXYGEN SATURATION: 92 % | BODY MASS INDEX: 17.43 KG/M2 | SYSTOLIC BLOOD PRESSURE: 152 MMHG | HEART RATE: 81 BPM | DIASTOLIC BLOOD PRESSURE: 69 MMHG

## 2023-10-18 DIAGNOSIS — R41.89 COGNITIVE IMPAIRMENT: ICD-10-CM

## 2023-10-18 DIAGNOSIS — R53.81 PHYSICAL DECONDITIONING: ICD-10-CM

## 2023-10-18 DIAGNOSIS — K59.01 SLOW TRANSIT CONSTIPATION: ICD-10-CM

## 2023-10-18 DIAGNOSIS — S52.614D CLOSED NONDISPLACED FRACTURE OF STYLOID PROCESS OF RIGHT ULNA WITH ROUTINE HEALING, SUBSEQUENT ENCOUNTER: ICD-10-CM

## 2023-10-18 DIAGNOSIS — Z87.81 S/P ORIF (OPEN REDUCTION INTERNAL FIXATION) FRACTURE: ICD-10-CM

## 2023-10-18 DIAGNOSIS — Z98.890 S/P ORIF (OPEN REDUCTION INTERNAL FIXATION) FRACTURE: ICD-10-CM

## 2023-10-18 DIAGNOSIS — S52.501D CLOSED FRACTURE OF DISTAL END OF RIGHT RADIUS WITH ROUTINE HEALING, UNSPECIFIED FRACTURE MORPHOLOGY, SUBSEQUENT ENCOUNTER: ICD-10-CM

## 2023-10-18 DIAGNOSIS — Z86.69 HISTORY OF MIGRAINE HEADACHES: ICD-10-CM

## 2023-10-18 DIAGNOSIS — K21.9 GASTROESOPHAGEAL REFLUX DISEASE, UNSPECIFIED WHETHER ESOPHAGITIS PRESENT: ICD-10-CM

## 2023-10-18 DIAGNOSIS — E46 MALNUTRITION, UNSPECIFIED TYPE (H): ICD-10-CM
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
## 2023-10-18 DIAGNOSIS — Z98.84 H/O GASTRIC BYPASS: ICD-10-CM

## 2023-10-18 DIAGNOSIS — S72.001D CLOSED FRACTURE OF RIGHT HIP WITH ROUTINE HEALING, SUBSEQUENT ENCOUNTER: Primary | ICD-10-CM

## 2023-10-18 DIAGNOSIS — D64.9 CHRONIC ANEMIA: ICD-10-CM

## 2023-10-18 DIAGNOSIS — I10 ESSENTIAL HYPERTENSION: ICD-10-CM

## 2023-10-18 DIAGNOSIS — E03.9 HYPOTHYROIDISM, UNSPECIFIED TYPE: ICD-10-CM

## 2023-10-18 DIAGNOSIS — D62 ACUTE BLOOD LOSS ANEMIA: ICD-10-CM

## 2023-10-18 PROCEDURE — 99309 SBSQ NF CARE MODERATE MDM 30: CPT | Performed by: NURSE PRACTITIONER

## 2023-10-18 RX ORDER — SENNA AND DOCUSATE SODIUM 50; 8.6 MG/1; MG/1
1 TABLET, FILM COATED ORAL 2 TIMES DAILY
Start: 2023-10-18 | End: 2023-11-22

## 2023-10-18 RX ORDER — LIDOCAINE 4 G/G
2 PATCH TOPICAL EVERY 24 HOURS
Start: 2023-10-18 | End: 2023-11-22

## 2023-10-18 NOTE — PATIENT INSTRUCTIONS
Angela Boucher  1945  1) Start lidocaine patch 4%. Apply 2 patches to right leg for pain- on in AM, off in PM. Diagnosis: Pain  2) Ice leg 20 minutes on PRN  JAYCEE Kunz CNP on 10/18/2023 at 3:25 PM

## 2023-10-18 NOTE — PROGRESS NOTES
[facility] GERIATRICS    Chief Complaint   Patient presents with    RECHECK     HPI:  Debbie Boucher is a 78 year old  (1945), who is being seen today for an episodic care visit at: [facility]  ([facility]) [[id number]].     Past medical history significant for anxiety, depression, migraines, GERD.     Summary of recent hospitalization:  Patient was hospitalized at [facility] from 9/25/2023 to 10/1/2023 for right intertrochanteric femur fracture s/p short intramedullary nail right hip on 9/26/2023 and Acute right distal radius fracture s/p ORIF of right distal radius fracture on 9/27/2023.  Patient presented to the emergency department for evaluation after a fall, she was taking out her trash and fell onto her right side.  Laboratory evaluation with unremarkable CBC and BMP on admission.  X-ray of right wrist revealed acute distal radial metaphyseal fracture with dorsal action and angulation, likely intra-articular extension, appears to be tiny ulnar styloid process fracture, surrounding soft tissue swelling.  Right femur x-ray reveals acute comminuted intertrochanteric fracture of the right femur with superior lateral displacement and coxa vara angulation, there is displaced lesser trochanteric fracture fragment, well joint alignment.  X-ray to pelvis revealed acute, limited intertrochanteric fracture of the right femur with severe lateral displacement and coxa vara angulation, displaced lesser trochanteric fracture fragment.  Head CT negative for acute findings.  Ortho consulted and patient is status post closed reduction insertion intramedullary nail right hip on 9/26/2023 and ORIF of right distal radius fracture on 9/27/2023.  Patient with acute blood loss anemia secondary to surgery with terrance of 6.8 on 9/29/2023 and received 2 units PRBC. Discharged to U for physical rehabilitation and medical management.     Today patient was seen for routine follow-up in the U.  Per discussion  with nursing staff and patient today she continues to have pain, feels like pain is not currently well managed, as she is still struggling to bear weight and complete daily activities.  Patient appears comfortable sitting in the recliner reading a book at time of visit.  She rates her pain as a 4/10.  She tells me with weightbearing pain increases to where it is barely tolerable.  We discussed that therapy feels she is reaching her discharge goals and plan is for patient to discharge home next week.  Patient tells me that she will be staying with her daughter.  Discussed with patient risks for ongoing use of high doses of narcotics and recommended to patient at this time Ortho needs to get involved, as if she discharges on scheduled medications someone will need to take over prescribing and management.  I also recommended that due to patient's ongoing severe pain secondary to the surgery that Ortho be involved and discussed with nursing staff to please call today and update them, as well as schedule appointment to meet with them for further pain management later this week.  Patient was in agreement with this plan and will discuss with her family today.  Patient otherwise denies any shortness of breath, chest pain, dizziness/lightheadedness.  She reports that her bowels are moving regularly.  She denies dysuria/trouble voiding.    Reviewed facility EMR including medications, recent nursing progress notes, vital signs.  Discussed plan of care with nursing and  today.    Allergies, and PMH/PSH reviewed in EPIC today.  REVIEW OF SYSTEMS:  10 point ROS of systems including Constitutional, Eyes, Respiratory, Cardiovascular, Gastroenterology, Genitourinary, Integumentary, Musculoskeletal, Psychiatric were all negative except for pertinent positives noted in my HPI.    Objective:   BP (!) 152/69   Pulse 81   Temp 97.7  F (36.5  C)   Resp 16   Ht 1.524 m (5')   Wt 40.3 kg (88 lb 12.8 oz)   SpO2 92%    BMI 17.34 kg/m    GENERAL APPEARANCE:  Alert, frail, in NAD  HEENT: normocephalic, moist mucous membranes, nose without drainage or crusting  RESP:  respiratory effort normal, no respiratory distress, Lung sounds clear, patient is on RA  CV: auscultation of heart done, rate and rhythm regular.   ABDOMEN: + bowel sounds, soft, nontender, no grimacing or guarding with palpation.  M/S:  no  lower extremity edema  NEURO: cranial nerves 2-12 grossly intact and at patient's baseline; moves extremities freely  PSYCH:  affect and mood normal      Labs done in SNF are in [facility]. Please refer to them using [facility]/Care Everywhere. and Recent labs in [facility] reviewed by me today.     Assessment/Plan:  Acute right intertrochanteric femur fracture s/p short intramedullary nail right hip on 9/26/2023  Acute right distal radius fracture s/p ORIF of right distal radius fracture on 9/27/2023  Acute right ulnar styloid fracture  Fall, subsequent encounter  Patient continues to report severe pain especially with activity and weightbearing and feels current pain regimen is not managing her pain  -On average is using 6 doses of oxycodone daily around-the-clock  Plan: Discussed pain management as above, expressing that the goal is to make pain manageable to complete tasks, but unable to completely get rid of pain.  I discussed risks of ongoing narcotic use, especially with the patient discharging next week and recommended starting to taper oxycodone.  Patient feels her pain is too severe to do that and I recommended Ortho get involved in her pain management, especially since she is leaving early next week and will need to take over prescribing of narcotics.  I discussed with nursing to call and update Ortho regarding patient's ongoing severe pain and requested they make an appointment for later this week to meet with the patient and her family.  Will add lidocaine patch today.  I also discussed with patient trying to ice her leg.  Continue tylenol 1000 mg TID, oxycodone 2.5 mg q6h scheduled and every 4 hours as needed, methocarbamol 500 mg QID. Continue aspirin 325 mg daily for DVT prophylaxis.  Weightbearing as tolerated with platform walker.  Follow-up with Ortho.     Acute blood loss anemia  Chronic anemia  Secondary to surgery, received 2 units PRBCs for terrance of 6.8 on 9/29/2020  Baseline hemoglobin 11-12  Hemoglobin 10.4 on 10/6/2023  Plan: CBC PRN.  Continue cyanocobalamin 100 mcg daily and folic acid 1 mg daily.  Monitor for s/s of bleeding     Postoperative delirium, resolved  Cognitive impairment  Caitie 18/30, CPT 5.2/5.6  Plan: Based on cognitive scores recommend patient okay to live alone with weekly check-in's to monitor and dangerous effects of impulsive behavior.  Patient's daughter sets up medications.     Essential hypertension  BP fair control  Plan: Continue lisinopril 20 mg daily.  Continue hydralazine 12.5 mg twice daily as needed for SBP greater than 170 monitor BP.     History of Migraine Headache  Plan: Continue rizatriptan 10 mg daily as needed.     GERD  Plan: Continue omeprazole 40 mg daily.     Hypothyroidism  TSH 3.91 on 4/29/2023  Plan: Continue levothyroxine 75 mcg daily.     Depression  Anxiety  Plan: Continue Wellbutrin 150 mg daily, fluoxetine 40 mg daily, and trazodone 100 mg at bedtime. Monitor mood and symptoms. Consider ACP referral PRN.     Malnutrition, BMI 17.34  Per TCU dietician assessment  Plan: Dietician following. Supplements per dietician. Monitor weights and po intake     Slow transit constipation  Bowels moving regularly  Plan: Continue bowel regimen.  Monitor bowels.     History of gastric bypass  Plan: Continue folic acid 1 mg daily and vitamin B12 100 mcg daily.     Physical deconditioning  Secondary to acute medical conditions as above  Plan: Encourage participation in physical therapy/occupational therapy for strengthening and deconditioning. Discharge planning per their recommendation.  Social work to assist with d/c planning.          MED REC REQUIRED  Post Medication Reconciliation Status:  Medication reconciliation previously completed during another office visit      Disclaimer: This note may contain text created using speech-recognition software and may contain unintended word substitutions.     Electronically signed by: JAYCEE Kunz CNP

## 2023-10-18 NOTE — LETTER
10/18/2023        RE: Debbie Boucher  [address] [facility] [address]        [facility] GERIATRICS    Chief Complaint   Patient presents with     RECHECK     HPI:  Debbie Boucher is a 78 year old  (1945), who is being seen today for an episodic care visit at: [facility]  ([facility]) [[id number]].     Past medical history significant for anxiety, depression, migraines, GERD.     Summary of recent hospitalization:  Patient was hospitalized at [facility] from 9/25/2023 to 10/1/2023 for right intertrochanteric femur fracture s/p short intramedullary nail right hip on 9/26/2023 and Acute right distal radius fracture s/p ORIF of right distal radius fracture on 9/27/2023.  Patient presented to the emergency department for evaluation after a fall, she was taking out her trash and fell onto her right side.  Laboratory evaluation with unremarkable CBC and BMP on admission.  X-ray of right wrist revealed acute distal radial metaphyseal fracture with dorsal action and angulation, likely intra-articular extension, appears to be tiny ulnar styloid process fracture, surrounding soft tissue swelling.  Right femur x-ray reveals acute comminuted intertrochanteric fracture of the right femur with superior lateral displacement and coxa vara angulation, there is displaced lesser trochanteric fracture fragment, well joint alignment.  X-ray to pelvis revealed acute, limited intertrochanteric fracture of the right femur with severe lateral displacement and coxa vara angulation, displaced lesser trochanteric fracture fragment.  Head CT negative for acute findings.  Ortho consulted and patient is status post closed reduction insertion intramedullary nail right hip on 9/26/2023 and ORIF of right distal radius fracture on 9/27/2023.  Patient with acute blood loss anemia secondary to surgery with terrance of 6.8 on 9/29/2023 and received 2 units PRBC. Discharged to [facility] for physical rehabilitation and  medical management.     Today patient was seen for routine follow-up in the TCU.  Per discussion with nursing staff and patient today she continues to have pain, feels like pain is not currently well managed, as she is still struggling to bear weight and complete daily activities.  Patient appears comfortable sitting in the recliner reading a book at time of visit.  She rates her pain as a 4/10.  She tells me with weightbearing pain increases to where it is barely tolerable.  We discussed that therapy feels she is reaching her discharge goals and plan is for patient to discharge home next week.  Patient tells me that she will be staying with her daughter.  Discussed with patient risks for ongoing use of high doses of narcotics and recommended to patient at this time Ortho needs to get involved, as if she discharges on scheduled medications someone will need to take over prescribing and management.  I also recommended that due to patient's ongoing severe pain secondary to the surgery that Ortho be involved and discussed with nursing staff to please call today and update them, as well as schedule appointment to meet with them for further pain management later this week.  Patient was in agreement with this plan and will discuss with her family today.  Patient otherwise denies any shortness of breath, chest pain, dizziness/lightheadedness.  She reports that her bowels are moving regularly.  She denies dysuria/trouble voiding.    Reviewed facility EMR including medications, recent nursing progress notes, vital signs.  Discussed plan of care with nursing and  today.    Allergies, and PMH/PSH reviewed in EPIC today.  REVIEW OF SYSTEMS:  10 point ROS of systems including Constitutional, Eyes, Respiratory, Cardiovascular, Gastroenterology, Genitourinary, Integumentary, Musculoskeletal, Psychiatric were all negative except for pertinent positives noted in my HPI.    Objective:   BP (!) 152/69   Pulse 81    Temp 97.7  F (36.5  C)   Resp 16   Ht 1.524 m (5')   Wt 40.3 kg (88 lb 12.8 oz)   SpO2 92%   BMI 17.34 kg/m    GENERAL APPEARANCE:  Alert, frail, in NAD  HEENT: normocephalic, moist mucous membranes, nose without drainage or crusting  RESP:  respiratory effort normal, no respiratory distress, Lung sounds clear, patient is on RA  CV: auscultation of heart done, rate and rhythm regular.   ABDOMEN: + bowel sounds, soft, nontender, no grimacing or guarding with palpation.  M/S:  no  lower extremity edema  NEURO: cranial nerves 2-12 grossly intact and at patient's baseline; moves extremities freely  PSYCH:  affect and mood normal      Labs done in SNF are in [facility]. Please refer to them using [employer]/Care Everywhere. and Recent labs in [facility] reviewed by me today.     Assessment/Plan:  Acute right intertrochanteric femur fracture s/p short intramedullary nail right hip on 9/26/2023  Acute right distal radius fracture s/p ORIF of right distal radius fracture on 9/27/2023  Acute right ulnar styloid fracture  Fall, subsequent encounter  Patient continues to report severe pain especially with activity and weightbearing and feels current pain regimen is not managing her pain  -On average is using 6 doses of oxycodone daily around-the-clock  Plan: Discussed pain management as above, expressing that the goal is to make pain manageable to complete tasks, but unable to completely get rid of pain.  I discussed risks of ongoing narcotic use, especially with the patient discharging next week and recommended starting to taper oxycodone.  Patient feels her pain is too severe to do that and I recommended Ortho get involved in her pain management, especially since she is leaving early next week and will need to take over prescribing of narcotics.  I discussed with nursing to call and update Ortho regarding patient's ongoing severe pain and requested they make an appointment for later this week to meet with the patient and her  family.  Will add lidocaine patch today.  I also discussed with patient trying to ice her leg. Continue tylenol 1000 mg TID, oxycodone 2.5 mg q6h scheduled and every 4 hours as needed, methocarbamol 500 mg QID. Continue aspirin 325 mg daily for DVT prophylaxis.  Weightbearing as tolerated with platform walker.  Follow-up with Ortho.     Acute blood loss anemia  Chronic anemia  Secondary to surgery, received 2 units PRBCs for terrance of 6.8 on 9/29/2020  Baseline hemoglobin 11-12  Hemoglobin 10.4 on 10/6/2023  Plan: CBC PRN.  Continue cyanocobalamin 100 mcg daily and folic acid 1 mg daily.  Monitor for s/s of bleeding     Postoperative delirium, resolved  Cognitive impairment  Caitie 18/30, CPT 5.2/5.6  Plan: Based on cognitive scores recommend patient okay to live alone with weekly check-in's to monitor and dangerous effects of impulsive behavior.  Patient's daughter sets up medications.     Essential hypertension  BP fair control  Plan: Continue lisinopril 20 mg daily.  Continue hydralazine 12.5 mg twice daily as needed for SBP greater than 170 monitor BP.     History of Migraine Headache  Plan: Continue rizatriptan 10 mg daily as needed.     GERD  Plan: Continue omeprazole 40 mg daily.     Hypothyroidism  TSH 3.91 on 4/29/2023  Plan: Continue levothyroxine 75 mcg daily.     Depression  Anxiety  Plan: Continue Wellbutrin 150 mg daily, fluoxetine 40 mg daily, and trazodone 100 mg at bedtime. Monitor mood and symptoms. Consider ACP referral PRN.     Malnutrition, BMI 17.34  Per TCU dietician assessment  Plan: Dietician following. Supplements per dietician. Monitor weights and po intake     Slow transit constipation  Bowels moving regularly  Plan: Continue bowel regimen.  Monitor bowels.     History of gastric bypass  Plan: Continue folic acid 1 mg daily and vitamin B12 100 mcg daily.     Physical deconditioning  Secondary to acute medical conditions as above  Plan: Encourage participation in physical  therapy/occupational therapy for strengthening and deconditioning. Discharge planning per their recommendation. Social work to assist with d/c planning.          MED REC REQUIRED  Post Medication Reconciliation Status:  Medication reconciliation previously completed during another office visit      Disclaimer: This note may contain text created using speech-recognition software and may contain unintended word substitutions.     Electronically signed by: JAYCEE Kunz CNP         Sincerely,        JAYCEE Kunz CNP

## 2023-10-19 ENCOUNTER — LAB REQUISITION (OUTPATIENT)
Dept: LAB | Facility: CLINIC | Age: 78
End: 2023-10-19
Payer: MEDICARE

## 2023-10-19 DIAGNOSIS — U07.1 COVID-19: ICD-10-CM

## 2023-10-19 PROCEDURE — 87635 SARS-COV-2 COVID-19 AMP PRB: CPT | Performed by: NURSE PRACTITIONER

## 2023-10-20 ENCOUNTER — DISCHARGE SUMMARY NURSING HOME (OUTPATIENT)
Dept: GERIATRICS | Facility: CLINIC | Age: 78
End: 2023-10-20
Payer: MEDICARE

## 2023-10-20 VITALS
WEIGHT: 88.8 LBS | RESPIRATION RATE: 18 BRPM | SYSTOLIC BLOOD PRESSURE: 154 MMHG | DIASTOLIC BLOOD PRESSURE: 76 MMHG | HEIGHT: 60 IN | BODY MASS INDEX: 17.43 KG/M2 | OXYGEN SATURATION: 97 % | HEART RATE: 98 BPM | TEMPERATURE: 98 F

## 2023-10-20 DIAGNOSIS — S52.614D CLOSED NONDISPLACED FRACTURE OF STYLOID PROCESS OF RIGHT ULNA WITH ROUTINE HEALING, SUBSEQUENT ENCOUNTER: ICD-10-CM

## 2023-10-20 DIAGNOSIS — F41.9 ANXIETY: ICD-10-CM

## 2023-10-20 DIAGNOSIS — K59.01 SLOW TRANSIT CONSTIPATION: ICD-10-CM

## 2023-10-20 DIAGNOSIS — E46 MALNUTRITION, UNSPECIFIED TYPE (H): ICD-10-CM

## 2023-10-20 DIAGNOSIS — Z87.81 S/P ORIF (OPEN REDUCTION INTERNAL FIXATION) FRACTURE: ICD-10-CM

## 2023-10-20 DIAGNOSIS — D62 ACUTE BLOOD LOSS ANEMIA: ICD-10-CM

## 2023-10-20 DIAGNOSIS — R41.89 COGNITIVE IMPAIRMENT: ICD-10-CM

## 2023-10-20 DIAGNOSIS — K21.9 GASTROESOPHAGEAL REFLUX DISEASE, UNSPECIFIED WHETHER ESOPHAGITIS PRESENT: ICD-10-CM

## 2023-10-20 DIAGNOSIS — I10 ESSENTIAL HYPERTENSION: ICD-10-CM
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
## 2023-10-20 DIAGNOSIS — D64.9 CHRONIC ANEMIA: ICD-10-CM

## 2023-10-20 DIAGNOSIS — R53.81 PHYSICAL DECONDITIONING: ICD-10-CM

## 2023-10-20 DIAGNOSIS — Z86.69 HISTORY OF MIGRAINE HEADACHES: ICD-10-CM

## 2023-10-20 DIAGNOSIS — Z98.84 H/O GASTRIC BYPASS: ICD-10-CM

## 2023-10-20 DIAGNOSIS — Z98.890 S/P ORIF (OPEN REDUCTION INTERNAL FIXATION) FRACTURE: ICD-10-CM

## 2023-10-20 DIAGNOSIS — F32.A DEPRESSION, UNSPECIFIED DEPRESSION TYPE: ICD-10-CM

## 2023-10-20 DIAGNOSIS — E03.9 HYPOTHYROIDISM, UNSPECIFIED TYPE: ICD-10-CM

## 2023-10-20 DIAGNOSIS — S72.001D CLOSED FRACTURE OF RIGHT HIP WITH ROUTINE HEALING, SUBSEQUENT ENCOUNTER: Primary | ICD-10-CM

## 2023-10-20 DIAGNOSIS — S52.501D CLOSED FRACTURE OF DISTAL END OF RIGHT RADIUS WITH ROUTINE HEALING, UNSPECIFIED FRACTURE MORPHOLOGY, SUBSEQUENT ENCOUNTER: ICD-10-CM

## 2023-10-20 LAB — SARS-COV-2 RNA RESP QL NAA+PROBE: NEGATIVE

## 2023-10-20 PROCEDURE — 99316 NF DSCHRG MGMT 30 MIN+: CPT | Performed by: NURSE PRACTITIONER

## 2023-10-20 RX ORDER — METHOCARBAMOL 500 MG/1
500 TABLET, FILM COATED ORAL 4 TIMES DAILY PRN
Start: 2023-10-20 | End: 2023-11-22

## 2023-10-20 RX ORDER — OXYCODONE HYDROCHLORIDE 5 MG/1
2.5 TABLET ORAL EVERY 8 HOURS
Qty: 20 TABLET | Refills: 0 | Status: SHIPPED | OUTPATIENT
Start: 2023-10-20

## 2023-10-20 NOTE — LETTER
10/20/2023        RE: Debbie Boucher  [address]  [facility] [address]        [facility] GERIATRICS DISCHARGE SUMMARY  PATIENT'S NAME: Debbie Boucher  YOB: 1945  MEDICAL RECORD NUMBER:  [id number]  Place of Service where encounter took place:  [facility]  ([facility]) [[id number]]    PRIMARY CARE PROVIDER AND CLINIC RESPONSIBLE AFTER TRANSFER:   Tiago Tellez, NP, [address] [facility] [address]    [facility] Provider     Transferring providers: Vanna Overton, JAYCEE CNP, Roxy Puga MD  Recent Hospitalization/ED:  [facility] stay 9/25/2023 to 10/1/2023.  Date of SNF Admission: October 01, 2023  Date of SNF (anticipated) Discharge: October 24, 2023  Discharged to: home with family  Cognitive Scores: Slums 18/30, CPT 5.2/5.6   Physical Function: Patient ambulating 100 feet with right platform walker contact-guard assist, transfers contact-guard assist to min assist and bed mobility contact-guard to min assist, set up for upper body dressing and lower body dressing, grooming and hygiene, eating     CODE STATUS/ADVANCE DIRECTIVES DISCUSSION:  Full Code   ALLERGIES: Hydrochlorothiazide, Contrast dye, Iodinated contrast media, Levofloxacin, and Sulfa antibiotics    NURSING FACILITY COURSE   Medication Changes/Rationale:   Pain regimen adjusted    Summary of nursing facility stay:   Patient was seen today for discharge evaluation.  She does feel her pain is improving, rates her pain at time of visit 4/10 and reports that pain with activity is getting up to a 4.5-5/10.  She is agreeable to trial and reduction of oxycodone today, we discussed importance of follow-up with Ortho next week to continue tapering medications and continue with ongoing pain management.  She denies any shortness of breath, chest pain, dizziness/lightheadedness, dysuria/trouble urinating.  She reports bowels are moving slow and she feels constipated-is agreeable  today to taking MiraLAX and updated nursing about this.    Summary of specific problems addressed in the TCU  Acute right intertrochanteric femur fracture s/p short intramedullary nail right hip on 9/26/2023  Acute right distal radius fracture s/p ORIF of right distal radius fracture on 9/27/2023  Acute right ulnar styloid fracture  Fall, subsequent encounter  Patient continues to report severe pain especially with activity and weightbearing and feels current pain regimen is not managing her pain  Nursing staff updated Ortho regarding ongoing pain management concerns from earlier this week and will follow up with her on 10/27  -pain mostly to her hip  -On average is using 6 doses of oxycodone daily around-the-clock  -Today feels pain is improving and is agreeable to reducing to 3 scheduled doses per day.  Plan: Continue tylenol 1000 mg TID. Reduce oxycodone 2.5 mg q8h scheduled and every 4 hours as needed. Change methocarbamol 500 mg QID PRN at discharge. Continue aspirin 325 mg daily for DVT prophylaxis.  Weightbearing as tolerated with platform walker.  Follow-up with Ortho as scheduled on 10/27     Acute blood loss anemia  Chronic anemia  Secondary to surgery, received 2 units PRBCs for terrance of 6.8 on 9/29/2020  Baseline hemoglobin 11-12  Hemoglobin 10.4 on 10/6/2023  Plan: CBC PRN.  Continue cyanocobalamin 100 mcg daily and folic acid 1 mg daily.  Monitor for s/s of bleeding     Postoperative delirium, resolved  Cognitive impairment  Caitie 18/30, CPT 5.2/5.6  Plan: Based on cognitive scores recommend patient okay to live alone with weekly check-in's to monitor and dangerous effects of impulsive behavior.  Patient's daughter sets up medications. Plan at discharge is for patient to stay with her daughter initially     Essential hypertension  BP fair control- seems higher in the AM- lower later in the day-suspect pain is contributing  Plan: Continue lisinopril 20 mg daily. Monitor BP. Follow up with PCP for  ongoing management     History of Migraine Headache  Plan: Continue rizatriptan 10 mg daily as needed.     GERD  Plan: Continue omeprazole 40 mg daily.     Hypothyroidism  TSH 3.91 on 4/29/2023  Plan: Continue levothyroxine 75 mcg daily.     Depression  Anxiety  Plan: Continue Wellbutrin 150 mg daily, fluoxetine 40 mg daily, and trazodone 100 mg at bedtime. Monitor mood and symptoms. Consider ACP referral PRN.     Malnutrition, BMI 17.34  Per TCU dietician assessment  Plan: Dietician following. Supplements per dietician. Monitor weights and po intake     Slow transit constipation  Reports feeling constipated today- asked nursing to give miralax today  Plan: Continue bowel regimen.  Monitor bowels.     History of gastric bypass  Plan: Continue folic acid 1 mg daily and vitamin B12 100 mcg daily.     Physical deconditioning  Secondary to acute medical conditions as above  Completed course of therapy in the TCU  Plan: Continue therapy through home care     Discharge Medications:  MED REC REQUIRED  Post Medication Reconciliation Status:  Medication reconciliation previously completed during another office visit     Current Outpatient Medications   Medication Sig Dispense Refill     acetaminophen (TYLENOL) 500 MG tablet Take 2 tablets (1,000 mg) by mouth 3 times daily       aspirin (ASA) 325 MG EC tablet Take 1 tablet (325 mg) by mouth daily 30 tablet 0     buPROPion (WELLBUTRIN XL) 150 MG 24 hr tablet Take 150 mg by mouth every morning       calcium citrate (CITRACAL) 950 (200 Ca) MG tablet Take 1 tablet by mouth 2 times daily       cyanocobalamin (VITAMIN B-12) 100 MCG tablet Take 1 tablet (100 mcg) by mouth daily       FLUoxetine (PROZAC) 40 MG capsule Take 40 mg by mouth daily       folic acid (FOLVITE) 1 MG tablet Take 1 tablet (1 mg) by mouth daily       Lidocaine (LIDOCARE) 4 % Patch Place 2 patches onto the skin every 24 hours To prevent lidocaine toxicity, patient should be patch free for 12 hrs daily.        lisinopril (ZESTRIL) 20 MG tablet Take 20 mg by mouth daily Hold for SBP <110       methocarbamol (ROBAXIN) 500 MG tablet Take 1 tablet (500 mg) by mouth 4 times daily as needed for muscle spasms       multivitamin w/minerals (THERA-VIT-M) tablet Take 1 tablet by mouth daily       naloxone (NARCAN) 4 MG/0.1ML nasal spray Spray 4 mg into one nostril alternating nostrils as needed for opioid reversal every 2-3 minutes until assistance arrives       omeprazole (PRILOSEC) 40 MG DR capsule Take 40 mg by mouth daily       oxyCODONE (ROXICODONE) 5 MG tablet Take 0.5 tablets (2.5 mg) by mouth every 8 hours 20 tablet 0     oxyCODONE (ROXICODONE) 5 MG tablet Take 0.5 tablets (2.5 mg) by mouth every 4 hours as needed for severe pain or moderate to severe pain 30 tablet 0     polyethylene glycol (MIRALAX) 17 GM/Dose powder Take 17 g by mouth daily as needed for constipation 510 g 0     Rizatriptan Benzoate (MAXALT PO) Take 10 mg by mouth once as needed for migraine       SENNA-docusate sodium (SENNA S) 8.6-50 MG tablet Take 1 tablet by mouth 2 times daily       traZODone (DESYREL) 100 MG tablet Take 100 mg by mouth at bedtime May repeat additional 100 mg for persistent insomnia (max 200 mg/night)       vitamin D3 (CHOLECALCIFEROL) 50 mcg (2000 units) tablet Take 1 tablet (50 mcg) by mouth daily       levothyroxine (SYNTHROID/LEVOTHROID) 75 MCG tablet Take 75 mcg by mouth daily          Controlled medications:   Medication: oxycodone , 14 tabs given to patient at the time of discharge to take home     Past Medical History:   Past Medical History:   Diagnosis Date     Anxiety      Depressive disorder      Hypothyroidism      Migraines      PONV (postoperative nausea and vomiting)      Physical Exam:   Vitals: BP (!) 154/76   Pulse 98   Temp 98  F (36.7  C)   Resp 18   Ht 1.524 m (5')   Wt 40.3 kg (88 lb 12.8 oz)   SpO2 97%   BMI 17.34 kg/m    BMI: Body mass index is 17.34 kg/m .  GENERAL APPEARANCE:  Alert, in NAD  HEENT:  normocephalic, moist mucous membranes, nose without drainage or crusting  RESP:  respiratory effort normal, no respiratory distress, Lung sounds clear, patient is on RA  CV: auscultation of heart done, rate and rhythm regular.   ABDOMEN: + bowel sounds, soft, nontender, no grimacing or guarding with palpation.  M/S: no lower extremity edema; R wrist in brace  SKIN:  Inspection and palpation of skin and subcutaneous tissue: skin warm, dry without rashes  NEURO: cranial nerves 2-12 grossly intact and at patient's baseline  PSYCH:  affect and mood normal      SNF labs: Labs done in SNF are in [facility]. Please refer to them using [employer]/Care Everywhere. and Recent labs in [facility] reviewed by me today.     DISCHARGE PLAN:  Follow up labs: No labs needed  Medical Follow Up:     Follow up with primary care provider in 1 week  Follow up with specialist   -Appointment at [facility] 10/27/23 @ 4:30pm with Dr. Lane at [facility]    Appointments in Next Year      Nov 09, 2023 11:30 AM  NEW ENDOCRINE with Laura Plata MD  [facility] ([facility] ) [phone]     Discharge Services: Home Care:  Occupational Therapy, Physical Therapy, Registered Nurse, and Home Health Aide  Discharge Instructions:   Weight bearing restrictions:  Weight bearing as tolerated through right forearm and splint and on right leg with platform walker  Keep right splint in place to wrist  Change tylenol to as needed once pain improves. You will need to taper off the oxycodone- please work with ortho on doing this  Based on cognitive scores recommend patient okay to live alone with weekly check-in's to monitor and dangerous effects of impulsive behavior    TOTAL DISCHARGE TIME:   Greater than 30 minutes  Electronically signed by:  JAYCEE Kunz CNP     Documentation of Face to Face and Certification for Home Health Services    I certify that patient: Debbie Boucher is under my care and  that I, or a nurse practitioner or physician's assistant working with me, had a face-to-face encounter that meets the physician face-to-face encounter requirements with this patient on: 10/20/2023.    This encounter with the patient was in whole, or in part, for the following medical condition, which is the primary reason for home health care:   1. Closed fracture of right hip with routine healing, subsequent encounter    2. S/P ORIF (open reduction internal fixation) fracture    3. Closed fracture of distal end of right radius with routine healing, unspecified fracture morphology, subsequent encounter    4. Closed nondisplaced fracture of styloid process of right ulna with routine healing, subsequent encounter    5. Fall, subsequent encounter    6. Acute blood loss anemia    7. Chronic anemia    8. Cognitive impairment    9. Essential hypertension    10. History of migraine headaches    11. Gastroesophageal reflux disease, unspecified whether esophagitis present    12. Hypothyroidism, unspecified type    13. Depression, unspecified depression type    14. Anxiety    15. Malnutrition, unspecified type (H24)    16. Slow transit constipation    17. H/O gastric bypass    18. Physical deconditioning      .    I certify that, based on my findings, the following services are medically necessary home health services: Nursing, Occupational Therapy, Physical Therapy, and HHA .    My clinical findings support the need for the above services because: Nurse is needed: to provide medication management, Occupational Therapy Services are needed to assess and treat cognitive ability and address ADL safety due to impairment in completing ADLs safely and independently as she returns home from TCU stay. and Physical Therapy Services are needed to assess and treat the following functional impairments: generalized weakness and deconditioning.    Further, I certify that my clinical findings support that this patient is homebound (i.e.  absences from home require considerable and taxing effort and are for medical reasons or [employer] services or infrequently or of short duration when for other reasons) because: Requires assistance of another person or specialized equipment to access medical services because patient: Requires supervision of another for safe transfer...    Based on the above findings. I certify that this patient is confined to the home and needs intermittent skilled nursing care, physical therapy and/or speech therapy.  The patient is under my care, and I have initiated the establishment of the plan of care.  This patient will be followed by a physician who will periodically review the plan of care.  Physician/Provider to provide follow up care: Tiago Tellez APRN CNP on 10/20/2023 at 11:20 AM                Sincerely,        JAYCEE Kunz CNP

## 2023-10-20 NOTE — PROGRESS NOTES
[facility] GERIATRICS DISCHARGE SUMMARY  PATIENT'S NAME: Debbie Boucher  YOB: 1945  MEDICAL RECORD NUMBER:  [id number]  Place of Service where encounter took place:  [facility]  ([facility]) [[id number]]    PRIMARY CARE PROVIDER AND CLINIC RESPONSIBLE AFTER TRANSFER:   Tiago Tellez, NP, [address] [facility] [id number]    [facility] Provider     Transferring providers: Vanna Overton, JAYCEE CORNELIUS, Roxy Puga MD  Recent Hospitalization/ED:  [facility] Hospital stay 9/25/2023 to 10/1/2023.  Date of SNF Admission: October 01, 2023  Date of SNF (anticipated) Discharge: October 24, 2023  Discharged to: home with family  Cognitive Scores: Slums 18/30, CPT 5.2/5.6   Physical Function: Patient ambulating 100 feet with right platform walker contact-guard assist, transfers contact-guard assist to min assist and bed mobility contact-guard to min assist, set up for upper body dressing and lower body dressing, grooming and hygiene, eating     CODE STATUS/ADVANCE DIRECTIVES DISCUSSION:  Full Code   ALLERGIES: Hydrochlorothiazide, Contrast dye, Iodinated contrast media, Levofloxacin, and Sulfa antibiotics    NURSING FACILITY COURSE   Medication Changes/Rationale:   Pain regimen adjusted    Summary of nursing facility stay:   Patient was seen today for discharge evaluation.  She does feel her pain is improving, rates her pain at time of visit 4/10 and reports that pain with activity is getting up to a 4.5-5/10.  She is agreeable to trial and reduction of oxycodone today, we discussed importance of follow-up with Ortho next week to continue tapering medications and continue with ongoing pain management.  She denies any shortness of breath, chest pain, dizziness/lightheadedness, dysuria/trouble urinating.  She reports bowels are moving slow and she feels constipated-is agreeable today to taking MiraLAX and updated nursing about this.    Summary of specific problems  Patient notified    Phone visit scheduled    addressed in the TCU  Acute right intertrochanteric femur fracture s/p short intramedullary nail right hip on 9/26/2023  Acute right distal radius fracture s/p ORIF of right distal radius fracture on 9/27/2023  Acute right ulnar styloid fracture  Fall, subsequent encounter  Patient continues to report severe pain especially with activity and weightbearing and feels current pain regimen is not managing her pain  Nursing staff updated Ortho regarding ongoing pain management concerns from earlier this week and will follow up with her on 10/27  -pain mostly to her hip  -On average is using 6 doses of oxycodone daily around-the-clock  -Today feels pain is improving and is agreeable to reducing to 3 scheduled doses per day.  Plan: Continue tylenol 1000 mg TID. Reduce oxycodone 2.5 mg q8h scheduled and every 4 hours as needed. Change methocarbamol 500 mg QID PRN at discharge. Continue aspirin 325 mg daily for DVT prophylaxis.  Weightbearing as tolerated with platform walker.  Follow-up with Ortho as scheduled on 10/27     Acute blood loss anemia  Chronic anemia  Secondary to surgery, received 2 units PRBCs for terrance of 6.8 on 9/29/2020  Baseline hemoglobin 11-12  Hemoglobin 10.4 on 10/6/2023  Plan: CBC PRN.  Continue cyanocobalamin 100 mcg daily and folic acid 1 mg daily.  Monitor for s/s of bleeding     Postoperative delirium, resolved  Cognitive impairment  Caitie 18/30, CPT 5.2/5.6  Plan: Based on cognitive scores recommend patient okay to live alone with weekly check-in's to monitor and dangerous effects of impulsive behavior.  Patient's daughter sets up medications. Plan at discharge is for patient to stay with her daughter initially     Essential hypertension  BP fair control- seems higher in the AM- lower later in the day-suspect pain is contributing  Plan: Continue lisinopril 20 mg daily. Monitor BP. Follow up with PCP for ongoing management     History of Migraine Headache  Plan: Continue rizatriptan 10 mg daily as  needed.     GERD  Plan: Continue omeprazole 40 mg daily.     Hypothyroidism  TSH 3.91 on 4/29/2023  Plan: Continue levothyroxine 75 mcg daily.     Depression  Anxiety  Plan: Continue Wellbutrin 150 mg daily, fluoxetine 40 mg daily, and trazodone 100 mg at bedtime. Monitor mood and symptoms. Consider ACP referral PRN.     Malnutrition, BMI 17.34  Per TCU dietician assessment  Plan: Dietician following. Supplements per dietician. Monitor weights and po intake     Slow transit constipation  Reports feeling constipated today- asked nursing to give miralax today  Plan: Continue bowel regimen.  Monitor bowels.     History of gastric bypass  Plan: Continue folic acid 1 mg daily and vitamin B12 100 mcg daily.     Physical deconditioning  Secondary to acute medical conditions as above  Completed course of therapy in the TCU  Plan: Continue therapy through home care     Discharge Medications:  MED REC REQUIRED  Post Medication Reconciliation Status:  Medication reconciliation previously completed during another office visit     Current Outpatient Medications   Medication Sig Dispense Refill    acetaminophen (TYLENOL) 500 MG tablet Take 2 tablets (1,000 mg) by mouth 3 times daily      aspirin (ASA) 325 MG EC tablet Take 1 tablet (325 mg) by mouth daily 30 tablet 0    buPROPion (WELLBUTRIN XL) 150 MG 24 hr tablet Take 150 mg by mouth every morning      calcium citrate (CITRACAL) 950 (200 Ca) MG tablet Take 1 tablet by mouth 2 times daily      cyanocobalamin (VITAMIN B-12) 100 MCG tablet Take 1 tablet (100 mcg) by mouth daily      FLUoxetine (PROZAC) 40 MG capsule Take 40 mg by mouth daily      folic acid (FOLVITE) 1 MG tablet Take 1 tablet (1 mg) by mouth daily      Lidocaine (LIDOCARE) 4 % Patch Place 2 patches onto the skin every 24 hours To prevent lidocaine toxicity, patient should be patch free for 12 hrs daily.      lisinopril (ZESTRIL) 20 MG tablet Take 20 mg by mouth daily Hold for SBP <110      methocarbamol (ROBAXIN)  500 MG tablet Take 1 tablet (500 mg) by mouth 4 times daily as needed for muscle spasms      multivitamin w/minerals (THERA-VIT-M) tablet Take 1 tablet by mouth daily      naloxone (NARCAN) 4 MG/0.1ML nasal spray Spray 4 mg into one nostril alternating nostrils as needed for opioid reversal every 2-3 minutes until assistance arrives      omeprazole (PRILOSEC) 40 MG DR capsule Take 40 mg by mouth daily      oxyCODONE (ROXICODONE) 5 MG tablet Take 0.5 tablets (2.5 mg) by mouth every 8 hours 20 tablet 0    oxyCODONE (ROXICODONE) 5 MG tablet Take 0.5 tablets (2.5 mg) by mouth every 4 hours as needed for severe pain or moderate to severe pain 30 tablet 0    polyethylene glycol (MIRALAX) 17 GM/Dose powder Take 17 g by mouth daily as needed for constipation 510 g 0    Rizatriptan Benzoate (MAXALT PO) Take 10 mg by mouth once as needed for migraine      SENNA-docusate sodium (SENNA S) 8.6-50 MG tablet Take 1 tablet by mouth 2 times daily      traZODone (DESYREL) 100 MG tablet Take 100 mg by mouth at bedtime May repeat additional 100 mg for persistent insomnia (max 200 mg/night)      vitamin D3 (CHOLECALCIFEROL) 50 mcg (2000 units) tablet Take 1 tablet (50 mcg) by mouth daily      levothyroxine (SYNTHROID/LEVOTHROID) 75 MCG tablet Take 75 mcg by mouth daily          Controlled medications:   Medication: oxycodone , 14 tabs given to patient at the time of discharge to take home     Past Medical History:   Past Medical History:   Diagnosis Date    Anxiety     Depressive disorder     Hypothyroidism     Migraines     PONV (postoperative nausea and vomiting)      Physical Exam:   Vitals: BP (!) 154/76   Pulse 98   Temp 98  F (36.7  C)   Resp 18   Ht 1.524 m (5')   Wt 40.3 kg (88 lb 12.8 oz)   SpO2 97%   BMI 17.34 kg/m    BMI: Body mass index is 17.34 kg/m .  GENERAL APPEARANCE:  Alert, in NAD  HEENT: normocephalic, moist mucous membranes, nose without drainage or crusting  RESP:  respiratory effort normal, no respiratory  distress, Lung sounds clear, patient is on RA  CV: auscultation of heart done, rate and rhythm regular.   ABDOMEN: + bowel sounds, soft, nontender, no grimacing or guarding with palpation.  M/S: no lower extremity edema; R wrist in brace  SKIN:  Inspection and palpation of skin and subcutaneous tissue: skin warm, dry without rashes  NEURO: cranial nerves 2-12 grossly intact and at patient's baseline  PSYCH:  affect and mood normal      SNF labs: Labs done in SNF are in [facility]. Please refer to them using [facility]/Care Everywhere. and Recent labs in [facility] reviewed by me today.     DISCHARGE PLAN:  Follow up labs: No labs needed  Medical Follow Up:     Follow up with primary care provider in 1 week  Follow up with specialist   -Appointment at [facility] 10/27/23 @ 4:30pm with Dr. Lane at [facility]    Appointments in Next Year      Nov 09, 2023 11:30 AM  NEW ENDOCRINE with Laura Plata MD  [facility] ([facility] ) [phone]     Discharge Services: Home Care:  Occupational Therapy, Physical Therapy, Registered Nurse, and Home Health Aide  Discharge Instructions:   Weight bearing restrictions:  Weight bearing as tolerated through right forearm and splint and on right leg with platform walker  Keep right splint in place to wrist  Change tylenol to as needed once pain improves. You will need to taper off the oxycodone- please work with ortho on doing this  Based on cognitive scores recommend patient okay to live alone with weekly check-in's to monitor and dangerous effects of impulsive behavior    TOTAL DISCHARGE TIME:   Greater than 30 minutes  Electronically signed by:  JAYCEE Kunz CNP     Documentation of Face to Face and Certification for Home Health Services    I certify that patient: Debbie Boucher is under my care and that I, or a nurse practitioner or physician's assistant working with me, had a face-to-face encounter that meets the  physician face-to-face encounter requirements with this patient on: 10/20/2023.    This encounter with the patient was in whole, or in part, for the following medical condition, which is the primary reason for home health care:   1. Closed fracture of right hip with routine healing, subsequent encounter    2. S/P ORIF (open reduction internal fixation) fracture    3. Closed fracture of distal end of right radius with routine healing, unspecified fracture morphology, subsequent encounter    4. Closed nondisplaced fracture of styloid process of right ulna with routine healing, subsequent encounter    5. Fall, subsequent encounter    6. Acute blood loss anemia    7. Chronic anemia    8. Cognitive impairment    9. Essential hypertension    10. History of migraine headaches    11. Gastroesophageal reflux disease, unspecified whether esophagitis present    12. Hypothyroidism, unspecified type    13. Depression, unspecified depression type    14. Anxiety    15. Malnutrition, unspecified type (H24)    16. Slow transit constipation    17. H/O gastric bypass    18. Physical deconditioning      .    I certify that, based on my findings, the following services are medically necessary home health services: Nursing, Occupational Therapy, Physical Therapy, and A .    My clinical findings support the need for the above services because: Nurse is needed: to provide medication management, Occupational Therapy Services are needed to assess and treat cognitive ability and address ADL safety due to impairment in completing ADLs safely and independently as she returns home from TCU stay. and Physical Therapy Services are needed to assess and treat the following functional impairments: generalized weakness and deconditioning.    Further, I certify that my clinical findings support that this patient is homebound (i.e. absences from home require considerable and taxing effort and are for medical reasons or [employer] services or  infrequently or of short duration when for other reasons) because: Requires assistance of another person or specialized equipment to access medical services because patient: Requires supervision of another for safe transfer...    Based on the above findings. I certify that this patient is confined to the home and needs intermittent skilled nursing care, physical therapy and/or speech therapy.  The patient is under my care, and I have initiated the establishment of the plan of care.  This patient will be followed by a physician who will periodically review the plan of care.  Physician/Provider to provide follow up care: Tiago Tellez, JAYCEE CNP on 10/20/2023 at 11:20 AM

## 2023-10-20 NOTE — PATIENT INSTRUCTIONS
[facility] Discharge Orders    Name: Debbie Boucher  : 1945  Planned Discharge Date: 10/24/2023  Discharged to: home with family    MEDICAL FOLLOW UP  Follow up with primary care provider in 1 week  Follow up with specialist   -Appointment at [facility] 10/27/23 @ 4:30pm with Dr. Lane at [facility]    Appointments in Next Year      2023 11:30 AM  [facility] [phone]       ORDER CHANGES:  Pain regimen adjusted    DISCHARGE MEDICATIONS:  The patient s pharmacy is authorized to dispense a 30-day supply of medications. Refill requests should be directed to the primary provider, Tiago Tellez   Medication: oxycodone , 14 tabs given to patient at the time of discharge to take home                Current Outpatient Medications   Medication Sig Dispense Refill    acetaminophen (TYLENOL) 500 MG tablet Take 2 tablets (1,000 mg) by mouth 3 times daily      aspirin (ASA) 325 MG EC tablet Take 1 tablet (325 mg) by mouth daily 30 tablet 0    buPROPion (WELLBUTRIN XL) 150 MG 24 hr tablet Take 150 mg by mouth every morning      calcium citrate (CITRACAL) 950 (200 Ca) MG tablet Take 1 tablet by mouth 2 times daily      cyanocobalamin (VITAMIN B-12) 100 MCG tablet Take 1 tablet (100 mcg) by mouth daily      FLUoxetine (PROZAC) 40 MG capsule Take 40 mg by mouth daily      folic acid (FOLVITE) 1 MG tablet Take 1 tablet (1 mg) by mouth daily      Lidocaine (LIDOCARE) 4 % Patch Place 2 patches onto the skin every 24 hours To prevent lidocaine toxicity, patient should be patch free for 12 hrs daily.      lisinopril (ZESTRIL) 20 MG tablet Take 20 mg by mouth daily Hold for SBP <110      methocarbamol (ROBAXIN) 500 MG tablet Take 1 tablet (500 mg) by mouth 4 times daily as needed for muscle spasms      multivitamin w/minerals (THERA-VIT-M) tablet Take 1 tablet by mouth daily      naloxone  (NARCAN) 4 MG/0.1ML nasal spray Spray 4 mg into one nostril alternating nostrils as needed for opioid reversal every 2-3 minutes until assistance arrives      omeprazole (PRILOSEC) 40 MG DR capsule Take 40 mg by mouth daily      oxyCODONE (ROXICODONE) 5 MG tablet Take 0.5 tablets (2.5 mg) by mouth every 8 hours 20 tablet 0    oxyCODONE (ROXICODONE) 5 MG tablet Take 0.5 tablets (2.5 mg) by mouth every 4 hours as needed for severe pain or moderate to severe pain 30 tablet 0    polyethylene glycol (MIRALAX) 17 GM/Dose powder Take 17 g by mouth daily as needed for constipation 510 g 0    Rizatriptan Benzoate (MAXALT PO) Take 10 mg by mouth once as needed for migraine      SENNA-docusate sodium (SENNA S) 8.6-50 MG tablet Take 1 tablet by mouth 2 times daily      traZODone (DESYREL) 100 MG tablet Take 100 mg by mouth at bedtime May repeat additional 100 mg for persistent insomnia (max 200 mg/night)      vitamin D3 (CHOLECALCIFEROL) 50 mcg (2000 units) tablet Take 1 tablet (50 mcg) by mouth daily      levothyroxine (SYNTHROID/LEVOTHROID) 75 MCG tablet Take 75 mcg by mouth daily         SERVICES:  Home Care:  Occupational Therapy, Physical Therapy, Registered Nurse, and Home Health Aide    ADDITIONAL INSTRUCTIONS:  Weight bearing restrictions:  Weight bearing as tolerated through right forearm and splint and on right leg with platform walker  Keep right splint in place to wrist  Change tylenol to as needed once pain improves. You will need to taper off the oxycodone- please work with ortho on doing this  Based on cognitive scores recommend patient okay to live alone with weekly check-in's to monitor and dangerous effects of impulsive behavior    Vanna Overton, JAYCEE CNP  This document was electronically signed on October 20, 2023

## 2023-10-23 ENCOUNTER — LAB REQUISITION (OUTPATIENT)
Dept: LAB | Facility: CLINIC | Age: 78
End: 2023-10-23
Payer: MEDICARE

## 2023-10-23 DIAGNOSIS — U07.1 COVID-19: ICD-10-CM

## 2023-10-23 PROCEDURE — 87635 SARS-COV-2 COVID-19 AMP PRB: CPT | Performed by: NURSE PRACTITIONER

## 2023-10-24 ENCOUNTER — TRANSITIONAL CARE UNIT VISIT (OUTPATIENT)
Dept: GERIATRICS | Facility: CLINIC | Age: 78
End: 2023-10-24
Payer: MEDICARE

## 2023-10-24 ENCOUNTER — PATIENT OUTREACH (OUTPATIENT)
Dept: CARE COORDINATION | Facility: CLINIC | Age: 78
End: 2023-10-24

## 2023-10-24 VITALS
RESPIRATION RATE: 16 BRPM | SYSTOLIC BLOOD PRESSURE: 157 MMHG | TEMPERATURE: 98.4 F | HEIGHT: 60 IN | OXYGEN SATURATION: 94 % | DIASTOLIC BLOOD PRESSURE: 76 MMHG | HEART RATE: 65 BPM | WEIGHT: 88 LBS | BODY MASS INDEX: 17.28 KG/M2

## 2023-10-24 DIAGNOSIS — S52.614D CLOSED NONDISPLACED FRACTURE OF STYLOID PROCESS OF RIGHT ULNA WITH ROUTINE HEALING, SUBSEQUENT ENCOUNTER: ICD-10-CM

## 2023-10-24 DIAGNOSIS — S52.501D CLOSED FRACTURE OF DISTAL END OF RIGHT RADIUS WITH ROUTINE HEALING, UNSPECIFIED FRACTURE MORPHOLOGY, SUBSEQUENT ENCOUNTER: ICD-10-CM

## 2023-10-24 DIAGNOSIS — R53.81 PHYSICAL DECONDITIONING: ICD-10-CM

## 2023-10-24 DIAGNOSIS — Z98.890 S/P ORIF (OPEN REDUCTION INTERNAL FIXATION) FRACTURE: ICD-10-CM

## 2023-10-24 DIAGNOSIS — Z87.81 S/P ORIF (OPEN REDUCTION INTERNAL FIXATION) FRACTURE: ICD-10-CM

## 2023-10-24 DIAGNOSIS — S72.001D CLOSED FRACTURE OF RIGHT HIP WITH ROUTINE HEALING, SUBSEQUENT ENCOUNTER: Primary | ICD-10-CM
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
## 2023-10-24 LAB — SARS-COV-2 RNA RESP QL NAA+PROBE: NEGATIVE

## 2023-10-24 PROCEDURE — 99308 SBSQ NF CARE LOW MDM 20: CPT | Performed by: NURSE PRACTITIONER

## 2023-10-24 NOTE — OP NOTE
Date of Service: September 27, 2023     Pre-Operative Diagnosis: Right extra-articular distal radius fracture.     Post-Operative Diagnosis: Right extra-articular distal radius fracture.      Procedure: Open reduction and internal fixation right extra-articular distal radius fracture.     Attending Surgeon: Amber Kaur MD     Assistant: Vanna Rodriguez PA-C     Anesthesia: Regional block for post-operative pain control and monitored anesthetic care.     Estimated Blood Loss: 5 cc     Tourniquet Time: 26 minutes.     Specimens: None.     Drains: None.     Implants: Acumed volar distal radius locking plate (distal, narrow) with combination of locking pegs and nonlocking screws.     Complications: None apparent.     Brief History: The patient is a 78-year-old, left-hand dominant female who sustained a fall onto her right wrist on 9/25/2023 when taking out the garbage.  She was transported via ambulance to the [facility] Emergency Department where radiographs revealed a displaced, extra-articular distal radius fracture.  She was also found to have a right hip fracture, and has undergone intramedullary nailing by my trauma partner.  I have reviewed her distal radius fracture radiographs and these demonstrate an extra-articular distal radius fracture with dorsal comminution and apex volar angulation of 25 degrees.  I have recommended surgical intervention in the form of open reduction and internal fixation.  I have described the procedure, post-operative protocol, and the expected outcomes.  I have also described the risks that include bleeding, infection, nerve or vessel damage, malunion or nonunion, implant failure or prominence, wrist or finger stiffness, and the possibility that further surgery is required.  Anesthetic risks are rare, but include breathing problems, heart problems, stroke, and death.  After a full discussion of the risks, benefits, and alternatives to surgery, the patient has elected to proceed  and informed consent was obtained.     Description of Procedure: The patient was identified in the pre-operative holding area.  Her consent was reviewed and signed and the operative site was identified and marked.  Her history and physical were reviewed.  She received a regional block by my anesthesia colleague.  She was brought to the operating room and transferred to the operating table in a supine position.  She was given sedation.  A tourniquet was applied to her right upper arm and the arm was placed onto an arm table.  All bony prominences were well-padded.  The splint was removed and a pre-scrub was performed.  The right arm was prepped and draped in a standard, sterile fashion.  A time-out was performed verifying the correct patient, procedure, site, and side.  Pre-operative prophylactic antibiotics were given.  An Esmarch was used to exsanguinate the limb and the tourniquet was inflated to 250 mmHg.  A longitudinal incision was made in line with the FCR tendon from the distal wrist flexion crease extending proximally about 6 cm.  This was taken through skin and subcutaneous tissue.  Hemostasis was achieved.  The superficial fascia was incised in line with the FCR tendon and the tendon was retracted ulnarly.  The deep fascia was incised in line with the incision assuring care to the palmar cutaneous branch of the median nerve.  The flexor pollicis longus was bluntly retracted ulnarly and the volar aspect of the distal radius was identified.  The pronator quadratus was sharply incised in an L-shaped fashion with a transverse limb at the watershed line.  The pronator quadratus was subperiosteally elevated and retracted ulnarly.  The brachioradialis was sharply released off of the radial styloid.  The fracture was identified and freed of fibrinous debris and hematoma.  The fracture was reduced with gentle traction and manipulation of the distal fragment.  An anatomic reduction was achieved.  A distal, narrow  plate was chosen and applied to the volar aspect of the distal radius.  This was temporarily pinned in place.  Fluoroscopy was utilized to evaluate the plate placement and fracture reduction.  Small adjustments were made until both were in acceptable limits.  A nonlocking screw was placed into the distal, ulnar screw hole to facilitate reduction of the plate to the bone.  Two additional locking pegs were placed into the distal row.  The plate was reduced to the radial shaft to correct any residual joint angulation.  A non-locking screw was placed into the oblong hole on the radial shaft.  Fluoroscopy confirmed appropriate reduction of the fragment with screws in a subchondral location.  Volar joint angulation and radial height were restored.  Two additional nonlocking screws were placed into the radial shaft.  Two locking pegs were placed into the radial styloid.  The initial non-locking screw in the distal plate was exchanged for a locking peg of shorter length.  Three views of the left wrist (AP, oblique, and lateral) were obtained and demonstrated appropriate screw length in a subchondral location, with restoration of radial height and volar joint angulation.  The carpus was reduced over the radial shaft.  Final images were saved.  The wound was thoroughly irrigated with normal saline.  The pronator quadratus was repaired with 3-0 Vicryl.  The tourniquet was deflated and hemostasis was achieved.  The subcutaneous tissue was closed with buried 4-0 Monocryl followed by running, subcuticular 4-0 Monocryl.  Benzoin and Steri-strips were applied.  Soft, sterile dressings were placed followed by a well-padded volar resting splint.  The patient tolerated the procedure well and there were no immediate complications.  She was awakened and brought to the recovery room in stable condition.  All sponge and needle counts were correct at the end of the case.      Post-Operative Plan: The patient will return to the inpatient  floor for continued rehabilitation.   She may weight-bear through her forearm, but should avoid any weightbearing through the hand or wrist.  She will elevate and ice.   She will remain in her splint until her follow-up appointment in 1 week.  At that time her splint will be removed, radiographs will be obtained, and should be transitioned to a removable thermoplastic splint.  She will begin gentle active range of motion.     Amber Kaur MD

## 2023-10-24 NOTE — PROGRESS NOTES
[facility] GERIATRICS  Face to Face and Medical Necessity Statement for DME Provider visit    Patient: Debbie Boucher  Gender: female  YOB: 1945  [place] Medical Record Number: [id number]  Demographics:  [id number] [facility] [address] [facility] [id number] [phone] (home)   Social Security Number:   Primary Care Provider: Tiago Tellez  Insurance: Payor: MEDICARE / Plan: MEDICARE / Product Type: Medicare /     HPI: Debbie Boucher is a 78 year old (1945), who is being seen today for a face to face provider visit at [facility]  ([facility]) [[id number]]. Medical necessity statement for DME included.     This patient requires the following: DME Ordered and Medical Necessity Statement   Right platform attachment for walker    Patient requires right platform attachment for walker due to weightbearing restrictions of right forearm due to fracture.      Pt needing above DME with expected length of need of  99  due to medical necessity associated with following diagnosis:     Closed fracture of right hip with routine healing, subsequent encounter  S/P ORIF (open reduction internal fixation) fracture  Closed fracture of distal end of right radius with routine healing, unspecified fracture morphology, subsequent encounter  Closed nondisplaced fracture of styloid process of right ulna with routine healing, subsequent encounter  Fall, subsequent encounter  Physical deconditioning      Patient seen today to order platform attachment for her walker.  She reports overall her pain is managed.  She denies any shortness of breath, chest pain, dizziness/lightheadedness, dysuria/trouble urinating, bowels moving regularly.    Past Medical History:   has a past medical history of Anxiety, Depressive disorder, Hypothyroidism, Migraines, and PONV (postoperative nausea and vomiting).    Review of Systems:  6 point ROS of systems including Constitutional, Respiratory, Cardiovascular,  Gastroenterology, Genitourinary, Musculoskeletal were all negative except for pertinent positives noted in my HPI.    Exam:  Vitals: BP (!) 157/76   Pulse 65   Temp 98.4  F (36.9  C)   Resp 16   Ht 1.524 m (5')   Wt 39.9 kg (88 lb)   SpO2 94%   BMI 17.19 kg/m    BMI: Body mass index is 17.19 kg/m .  GENERAL APPEARANCE:  Alert, in NAD  HEENT: normocephalic, moist mucous membranes, nose without drainage or crusting  RESP:  respiratory effort normal, no respiratory distress, patient is on RA  M/S: right arm in splint  PSYCH: affect and mood normal      Assessment/Plan:  1. Closed fracture of right hip with routine healing, subsequent encounter    2. S/P ORIF (open reduction internal fixation) fracture    3. Closed fracture of distal end of right radius with routine healing, unspecified fracture morphology, subsequent encounter    4. Closed nondisplaced fracture of styloid process of right ulna with routine healing, subsequent encounter    5. Fall, subsequent encounter    6. Physical deconditioning        Orders:  1. Facility staff/TC to contact DME company to get their order form for provider to fill out    ELECTRONICALLY SIGNED BY TAYLOR CERTIFIED PROVIDER: JAYCEE Kunz CNP   NPI: [id number]  [facility] [address] [place] [address]

## 2023-10-24 NOTE — TELEPHONE ENCOUNTER
Routed to the providers in IM    Patient of Tiago Richardson needing an appointment this week for hospital follow up and medication refill.  Can someone see the patient sometime this week?

## 2023-10-24 NOTE — PROGRESS NOTES
Clinic Care Coordination Contact  Care Team Conversations    SWCC received email from TCAlicia SEGOVIA sharing patient will likely discharge home on 10/24/2023 with [facility] homecare services for PT, OT, HHA, and RN. No further CC needs identified at this time. [facility] will attempt to reach patients daughter (per patient request) within 1-2 business days following discharge to establish contact and offer care management services.    RIKKI Bass/[facility]  Social Work Care Coordinator  [facility], and [place]  Phone: [phone]

## 2023-10-24 NOTE — PROGRESS NOTES
Clinic Care Coordination Contact  Care Team Conversations    [facility] received call/vm from TCU RN, Alanis, requesting return call. [facility] spoke with Alanis whom shared patient will discharge home today with prescription for pain medication (Oxycodone) that will last until follow-up with pain clinic scheduled for 10/27/2023. TCU RN informed writer that family has now rescheduled appointment and are requesting additional pain medications, however, TCU unable to provide any further pain medications at this time. TCU RN inquiring if patient can be seen 10/24 or 10/25 for follow-up with PCP and further medication management. Reviewed [facility] can forward request to clinic, however, patients PCP is no longer with [facility] and writer unsure of clinic availability regarding TCU follow-up appointments. TCU RN expressed understanding and shared she would also encourage patient/family to connect with pain clinic regarding potential of an earlier appointment. FYI sent to KATHI Manzano in regards to above.     Plan: [facility] will attempt to reach patients daughter (per patient request) within 1-2 business days following discharge to establish contact and offer care management services.     RIKKI Bass/LICSW  Social Work Care Coordinator  [facility] [place], [place], and [place]  Phone: [phone]

## 2023-10-24 NOTE — LETTER
10/24/2023        RE: Debbie Boucher  [address] [address] [address]        [facility] GERIATRICS  Face to Face and Medical Necessity Statement for DME Provider visit    Patient: Debbie Boucher  Gender: female  YOB: 1945  [place] Medical Record Number: [id number]  Demographics:  [id number] [id number] [phone] (home)   Social Security Number:   Primary Care Provider: Tiago Tellez  Insurance: Payor: MEDICARE / Plan: MEDICARE / Product Type: Medicare /     HPI: Debbie Boucher is a 78 year old (1945), who is being seen today for a face to face provider visit at [facility]  ([facility]) [[id number]]. Medical necessity statement for DME included.     This patient requires the following: DME Ordered and Medical Necessity Statement   Right platform attachment for walker    Patient requires right platform attachment for walker due to weightbearing restrictions of right forearm due to fracture.      Pt needing above DME with expected length of need of  99  due to medical necessity associated with following diagnosis:     Closed fracture of right hip with routine healing, subsequent encounter  S/P ORIF (open reduction internal fixation) fracture  Closed fracture of distal end of right radius with routine healing, unspecified fracture morphology, subsequent encounter  Closed nondisplaced fracture of styloid process of right ulna with routine healing, subsequent encounter  Fall, subsequent encounter  Physical deconditioning      Patient seen today to order platform attachment for her walker.  She reports overall her pain is managed.  She denies any shortness of breath, chest pain, dizziness/lightheadedness, dysuria/trouble urinating, bowels moving regularly.    Past Medical History:   has a past medical history of Anxiety, Depressive disorder, Hypothyroidism, Migraines, and PONV (postoperative nausea and vomiting).    Review of  Systems:  6 point ROS of systems including Constitutional, Respiratory, Cardiovascular, Gastroenterology, Genitourinary, Musculoskeletal were all negative except for pertinent positives noted in my HPI.    Exam:  Vitals: BP (!) 157/76   Pulse 65   Temp 98.4  F (36.9  C)   Resp 16   Ht 1.524 m (5')   Wt 39.9 kg (88 lb)   SpO2 94%   BMI 17.19 kg/m    BMI: Body mass index is 17.19 kg/m .  GENERAL APPEARANCE:  Alert, in NAD  HEENT: normocephalic, moist mucous membranes, nose without drainage or crusting  RESP:  respiratory effort normal, no respiratory distress, patient is on RA  M/S: right arm in splint  PSYCH: affect and mood normal      Assessment/Plan:  1. Closed fracture of right hip with routine healing, subsequent encounter    2. S/P ORIF (open reduction internal fixation) fracture    3. Closed fracture of distal end of right radius with routine healing, unspecified fracture morphology, subsequent encounter    4. Closed nondisplaced fracture of styloid process of right ulna with routine healing, subsequent encounter    5. Fall, subsequent encounter    6. Physical deconditioning        Orders:  1. Facility staff/TC to contact DME company to get their order form for provider to fill out    ELECTRONICALLY SIGNED BY [employer] CERTIFIED PROVIDER: JAYCEE Kunz CNP   NPI: [id number]  [facility] [address] [place] [address]       Sincerely,        JAYCEE Kunz CNP

## 2023-10-25 ENCOUNTER — PATIENT OUTREACH (OUTPATIENT)
Dept: CARE COORDINATION | Facility: CLINIC | Age: 78
End: 2023-10-25

## 2023-10-25 ENCOUNTER — VIRTUAL VISIT (OUTPATIENT)
Dept: FAMILY MEDICINE | Facility: CLINIC | Age: 78
End: 2023-10-25
Payer: MEDICARE

## 2023-10-25 DIAGNOSIS — S72.001D CLOSED FRACTURE OF RIGHT HIP WITH ROUTINE HEALING, SUBSEQUENT ENCOUNTER: ICD-10-CM

## 2023-10-25 PROCEDURE — 99213 OFFICE O/P EST LOW 20 MIN: CPT | Mod: VID | Performed by: FAMILY MEDICINE

## 2023-10-25 RX ORDER — OXYCODONE HYDROCHLORIDE 5 MG/1
2.5 TABLET ORAL EVERY 4 HOURS PRN
Qty: 30 TABLET | Refills: 0 | Status: SHIPPED | OUTPATIENT
Start: 2023-10-27 | End: 2023-11-06

## 2023-10-25 RX ORDER — OXYCODONE HYDROCHLORIDE 5 MG/1
2.5 TABLET ORAL EVERY 8 HOURS
Qty: 20 TABLET | Refills: 0 | Status: CANCELLED | OUTPATIENT
Start: 2023-10-25

## 2023-10-25 ASSESSMENT — ENCOUNTER SYMPTOMS
CONSTIPATION: 0
FATIGUE: 1
HEMATOCHEZIA: 0

## 2023-10-25 NOTE — TELEPHONE ENCOUNTER
Left message for Alanis to call back.  See provider's message below.    Per chart, patient has a virtual visit scheduled today with [facility].    Does patient want to keep virtual appointment today or see Dr. Silva on Friday 10/27 at noon (arrival time is at 11:40a).

## 2023-10-25 NOTE — PROGRESS NOTES
Debbie is a 78 year old who is being evaluated via a billable video visit.      How would you like to obtain your AVS? MyChart  If the video visit is dropped, the invitation should be resent by: Text to cell phone: [phone]  Will anyone else be joining your video visit? Jen daughter is with her today          Assessment & Plan     Diagnoses and all orders for this visit:    Closed fracture of right hip with routine healing, subsequent encounter  -     oxyCODONE (ROXICODONE) 5 MG tablet; Take 0.5 tablets (2.5 mg) by mouth every 4 hours as needed for severe pain  -     CBC with platelets; Future  -     PRIMARY CARE FOLLOW-UP SCHEDULING; Future    Other orders  -     REVIEW OF HEALTH MAINTENANCE PROTOCOL ORDERS      Refilled oxycodone, will run out of current prescription on 10/27/2023. Will bridge until she sees orthopedics on 10/31/2023. Denies side effects with current regimen. Hopeful that with next refill, we can wean oxy 2.5q4hrs prn to oxy 2.5q6 hours prn. Patient and daughter expresses understanding of her risk on opioids. Continue ASA 325mg until complete 30 days post surgery. Take with food. If patient experiences abdominal pain, stop taking Aspirin. Monitor for signs of anemia. She has a history of ROUY-EN-Y gastric bypass.  Obtain repeat CBC for follow up of post surgical anemia. Take multivitamin that contains folic acid and iron supplement every other day. Monitor for constipation. Recommend follow up with a primary care provider in 2 months.      20 minutes spent by me on the date of the encounter doing chart review, history and exam, documentation and further activities per the note     MED REC REQUIRED  Post Medication Reconciliation Status:         Uzma Rockwell MD  [facility]    Harper Lewis is a 78 year old, presenting for the following health issues:  No chief complaint on file.        Hospital Follow-up Visit:    Hospital/Nursing Home/IP Rehab Facility:   [facility]  Date of Admission: TCU  10/03/23  Date of Discharge: 10/24/23  Reason(s) for Admission: Fell Closed fracture of rt hip and rt ulnar fx    Was your hospitalization related to COVID-19? No   Problems taking medications regularly:  None  Medication changes since discharge: None  Problems adhering to non-medication therapy:  None    Summary of hospitalization:  [facility] discharge summary reviewed  Diagnostic Tests/Treatments reviewed.  Follow up needed: 10/31/2023 with orthopedics,   Other Healthcare Providers Involved in Patient s Care:         Specialist appointment - Othropedics and Physical Therapy  Update since discharge: improved.       Fell September 25th, 9/26: hip surgery, 9/27: wrist surgery, 10/01/2023 discharged to rehab. Disched from rehab yesterday. Worked well with therapy. Now staying at daughter's place until pain is better controlled. For the past week has been taking oxy 2.5 q4 hours.     Plan of care communicated with patient and family                   Review of Systems   Constitutional:  Positive for fatigue.   Cardiovascular:  Negative for chest pain.   Gastrointestinal:  Negative for constipation and hematochezia.   Musculoskeletal:         Hip pain   Neurological:  Negative for syncope.            Objective           Vitals:  No vitals were obtained today due to virtual visit.    Physical Exam   GENERAL: Healthy, alert and no distress.  PSYCH: Mentation appears normal, affect normal/bright, judgement and insight intact, normal speech and appearance well-groomed.                Video-Visit Details    Type of service:  Video Visit   Video Start Time:  2:30  Video End Time: 2:50    Originating Location (pt. Location): Home    Distant Location (provider location):  On-site  Platform used for Video Visit: "[employer]

## 2023-10-25 NOTE — PROGRESS NOTES
"Clinic Care Coordination Contact  [facility]/Voicemail    Clinical Data: Care Coordinator Outreach  Outreach Documentation Number of Outreach Attempt   10/25/2023   2:17 PM 1     [facility] attempted to reach patients daughterJen (verbal consent received 10/17/2023 during TCU care conference), for post-TCU discharge assessment. Patients daughter unavailable. Left message on patient's daughters voicemail with call back information and requested return call.    Per chart review, patients EPIC record indicates patient is currently at \"[facility]\". Due to above [facility] sent email to TCU SWAlicia, to confirm patient did discharge home on 10/24/2023.     Plan: Care Coordinator will try to reach patients daughter again in 1-2 business days.    RIKKI Bass/[facility]  Social Work Care Coordinator  [facility], and [place]  Phone: [phone]  "

## 2023-10-25 NOTE — NURSING NOTE
No chief complaint on file.      Initial There were no vitals taken for this visit. Estimated body mass index is 17.19 kg/m  as calculated from the following:    Height as of 10/24/23: 1.524 m (5').    Weight as of 10/24/23: 39.9 kg (88 lb).    Patient presents to the clinic using No DME    Is there anyone who you would like to be able to receive your results? No  If yes have patient fill out WILIAM

## 2023-10-26 ENCOUNTER — PATIENT OUTREACH (OUTPATIENT)
Dept: CARE COORDINATION | Facility: CLINIC | Age: 78
End: 2023-10-26
Payer: MEDICARE

## 2023-10-26 NOTE — PROGRESS NOTES
Clinic Care Coordination Contact  Care Team Conversations    Briefly spoke with patients daughter, Jen, whom shared she is currently out of town. Reviewed TCU request (10/24/2023) for additional pain medication while patient awaits pain clinic appointment. Jen shared patient had a virtual visit with [facility] on 10/25/2023 and was able to obtain a script for pain medication to last until pain clinic appointment 10/31/2023. Jen shared her aunt is assisting with patients daily needs while patients adult children are out of town. Jen shared she will be returning to [facility] 10/29 and would like to speak with CC regarding establishing a new primary care provider for patient. [facility] offered to scheduled CC assessment for 10/31/2023 at 2:00pm. Jen in agreement with plan. Allowed time and space for Jen to voice any additional needs. Jen shared patient has not heard from [facility] and would prefer to utilize an alternate homecare agency for homecare services. [facility] offered to connect with U SW to obtain orders and find alternate agency. Jen in agreement with plan.     [facility] emailed TCU SW, Alicia, whom shared TCU unable to send new referrals at this time as patient has discharged. Requested orders from TCU and was informed orders are listed in EPIC. Per report, TCU SW spoke with patients daughter and was informed preferred agency is [facility] In collaboration with [facility] BRYAN, a referral was sent to [place] with [facility]. Per report [facility] able to accept patients referral. [facility] updated patients daughter on above. Jen expressed understanding. [facility] offered to contact [facility] to provide update on above. Patients daughter in agreement with plan. [facility] No further CC needs identified at this time.     Plan: [facility] will contact patients daughter 10/31/2023 at 2:00pm to complete enrollment into program.     Breann Fernando  MSW/LICSW  Social Work Care Coordinator  [facility] [place], [place], and [place]  Phone: [phone]

## 2023-10-26 NOTE — TELEPHONE ENCOUNTER
[facility] spoke with patients daughter, Jen, today who confirmed patient had a virtual visit with [facility] on 10/25/2023. Jen shared patient was able to obtain a script for pain medication to last until pain clinic appointment 10/31/2023. No need for additional visit with Dr. Silva at this time.    RIKKI Bass/[facility]  Social Work Care Coordinator  [facility] [place], [place], and [place]  Phone: [phone]

## 2023-10-31 ENCOUNTER — PATIENT OUTREACH (OUTPATIENT)
Dept: CARE COORDINATION | Facility: CLINIC | Age: 78
End: 2023-10-31

## 2023-10-31 ENCOUNTER — MEDICAL CORRESPONDENCE (OUTPATIENT)
Dept: HEALTH INFORMATION MANAGEMENT | Facility: CLINIC | Age: 78
End: 2023-10-31

## 2023-10-31 NOTE — PROGRESS NOTES
Placed call to Jen to reschedule CC SW appointment to complete enrollment into Care Coordination due to CC SW being out today, however [facility].    Writer then received an incoming call from Jen.     Enrollment appointment rescheduled for 11/6/2023 at 11:00 AM.      Kaley VEGAS [facility]  Community Health Worker  [facility]:  [place], [place] & [facility] Care Coordination  [phone]

## 2023-11-06 ENCOUNTER — PATIENT OUTREACH (OUTPATIENT)
Dept: CARE COORDINATION | Facility: CLINIC | Age: 78
End: 2023-11-06

## 2023-11-06 NOTE — PROGRESS NOTES
Clinic Care Coordination Contact  [facility]/Voicemail    Clinical Data: Care Coordinator Outreach  Outreach Documentation Number of Outreach Attempt   11/6/2023  11:00 AM 1     Left message on  patients daughter, Jen,  (verbal consent received from patient 10/17/2023 during TCU care conference) voicemail with call back information and requested return call.    Plan: Care Coordinator will try to reach patients daughter again in 1-2 business days.    RIKKI Bass/[facility]  Social Work Care Coordinator  [facility], [place], and [place]  Phone: [phone]

## 2023-11-07 ENCOUNTER — PATIENT OUTREACH (OUTPATIENT)
Dept: CARE COORDINATION | Facility: CLINIC | Age: 78
End: 2023-11-07
Payer: MEDICARE

## 2023-11-07 ASSESSMENT — ACTIVITIES OF DAILY LIVING (ADL): DEPENDENT_IADLS:: MEDICATION MANAGEMENT

## 2023-11-07 NOTE — PROGRESS NOTES
Clinic Care Coordination Contact  [facility]/Voicemail    Clinical Data: Care Coordinator Outreach  Outreach Documentation Number of Outreach Attempt   11/6/2023  11:17 AM 1   11/7/2023  12:18 PM 2     Left message on patients daughter, Jen, (verbal consent received from patient 10/17/2023 during TCU care conference) voicemail with call back information and requested return call.     Plan: Care Coordinator will send care coordination introduction letter with care coordinator contact information and explanation of care coordination services via [facility]. Care Coordinator will do no further outreaches at this time.    RIKKI Bass/[facility]  Social Work Care Coordinator  [facility] [place], [place], and [place]  Phone: [phone]

## 2023-11-07 NOTE — PROGRESS NOTES
Clinic Care Coordination Contact  Clinic Care Coordination Contact  OUTREACH    Referral Information:  Referral Source: SNF/TCU  Primary Diagnosis: Psychosocial    Chief Complaint   Patient presents with    Clinic Care Coordination - Initial      Universal Utilization: Reviewed 11/7/2023  Clinic Utilization  Difficulty keeping appointments:: No  Compliance Concerns: No  No-Show Concerns: No  No PCP office visit in Past Year: No  Utilization      No Show Count (past year)  0             ED Visits  2             Hospital Admissions  2                    Current as of: 11/6/2023  3:44 PM              Clinical Concerns:  Current Medical Concerns:    Patient Active Problem List   Diagnosis    Suicidal ideation    Common bile duct dilatation    Burst fracture of T12 vertebra (H)    Fall, initial encounter    Moderate episode of recurrent major depressive disorder (H)    Closed displaced intertrochanteric fracture of right femur, initial encounter (H)    Hip fracture, right, closed, initial encounter (H)    Closed fracture of distal end of right radius, unspecified fracture morphology, initial encounter   Current Behavioral Concerns: none noted     Education Provided to patients daughterJen: [facility] role and reason for call.       Health Maintenance Reviewed: Due/Overdue   Health Maintenance Due   Topic Date Due    ADVANCE CARE PLANNING  Never done    DEPRESSION ACTION PLAN  Never done    HEPATITIS C SCREENING  Never done    IPV IMMUNIZATION (2 of 3 - Adult catch-up series) 11/03/1998    RSV VACCINE (Pregnancy & 60+) (1 - 1-dose 60+ series) Never done    ZOSTER IMMUNIZATION (2 of 3) 03/11/2010    LIPID  10/24/2012    TSH W/FREE T4 REFLEX  02/01/2018    MEDICARE ANNUAL WELLNESS VISIT  04/23/2022    INFLUENZA VACCINE (1) 09/01/2023    COVID-19 Vaccine (5 - 2023-24 season) 09/01/2023   Clinical Pathway: None    Medication Management:  Medication review status: Medications reviewed and no changes reported per patients  daughter.        Current Outpatient Medications   Medication    acetaminophen (TYLENOL) 500 MG tablet    aspirin (ASA) 325 MG EC tablet    buPROPion (WELLBUTRIN XL) 150 MG 24 hr tablet    calcium citrate (CITRACAL) 950 (200 Ca) MG tablet    cyanocobalamin (VITAMIN B-12) 100 MCG tablet    FLUoxetine (PROZAC) 40 MG capsule    folic acid (FOLVITE) 1 MG tablet    levothyroxine (SYNTHROID/LEVOTHROID) 75 MCG tablet    Lidocaine (LIDOCARE) 4 % Patch    lisinopril (ZESTRIL) 20 MG tablet    methocarbamol (ROBAXIN) 500 MG tablet    multivitamin w/minerals (THERA-VIT-M) tablet    naloxone (NARCAN) 4 MG/0.1ML nasal spray    omeprazole (PRILOSEC) 40 MG DR capsule    oxyCODONE (ROXICODONE) 5 MG tablet    polyethylene glycol (MIRALAX) 17 GM/Dose powder    Rizatriptan Benzoate (MAXALT PO)    SENNA-docusate sodium (SENNA S) 8.6-50 MG tablet    traZODone (DESYREL) 100 MG tablet    vitamin D3 (CHOLECALCIFEROL) 50 mcg (2000 units) tablet     No current facility-administered medications for this visit.     Functional Status:  Dependent ADLs:: Ambulation-walker  Dependent IADLs:: Medication Management  Bed or wheelchair confined:: No  Mobility Status: Independent w/Device    Living Situation:  Current living arrangement:: I live in a private home with family  Type of residence:: Private home - no stairs    Lifestyle & Psychosocial Needs:  Social Determinants of Health     Food Insecurity: Low Risk  (10/17/2023)    Food Insecurity     Within the past 12 months, did you worry that your food would run out before you got money to buy more?: No     Within the past 12 months, did the food you bought just not last and you didn t have money to get more?: No   Depression: Not at risk (8/7/2023)    PHQ-2     PHQ-2 Score: 1   Housing Stability: Low Risk  (10/17/2023)    Housing Stability     Do you have housing? : Yes     Are you worried about losing your housing?: No   Tobacco Use: Low Risk  (10/25/2023)    Patient History     Smoking Tobacco Use:  Never     Smokeless Tobacco Use: Never     Passive Exposure: Not on file   Financial Resource Strain: Low Risk  (10/17/2023)    Financial Resource Strain     Within the past 12 months, have you or your family members you live with been unable to get utilities (heat, electricity) when it was really needed?: No   Alcohol Use: Not on file   Transportation Needs: Low Risk  (10/17/2023)    Transportation Needs     Within the past 12 months, has lack of transportation kept you from medical appointments, getting your medicines, non-medical meetings or appointments, work, or from getting things that you need?: No   Physical Activity: Not on file   Interpersonal Safety: Not on file   Stress: Not on file   Social Connections: Not on file     Diet:: Regular  Inadequate nutrition (GOAL):: No  Tube Feeding: No  Inadequate activity/exercise (GOAL):: No  Significant changes in sleep pattern (GOAL): No  Transportation means:: Family, Regular car     Caodaism or spiritual beliefs that impact treatment:: No  Mental health DX:: Yes  Mental health DX how managed:: Medication  Mental health management concern (GOAL):: No  Informal Support system:: Children, Family     [facility] spoke with patients Jen alvarado, (verbal consent received from patient 10/17/2023 during TCU care conference) to introduce self and offer Care Coordination services. Per report patient is currently residing with Jen alvarado, and is continuing to receive services from [facility] app for PT, OT, RN, and HHA services. Patients daughter shared patient is beginning to take day trips back home where she is slowing regaining her confidence/independence. Jen reports patient still requires assistance at times with medication reminders and encouragement to utilize DME (walker). Jen voiced the reminders/encouragement generally occur when patient takes her muscle relaxer and pain medication. Jen declined any concerns related to housing, transportation, financial,  and/or food insecurities. Per report patient has all the supports and resources needed at this time. Reviewed recommendation to follow-up with PCP post TCU discharge. Per chart review patients PCP no longer within [facility] system. Offered to transfer patients daughter to central scheduling to assist with arranging follow-up appointment. Jen in agreement with plan. [facility] transferred Jen to central scheduling at end of call. [facility] reviewed ways to establish a new PCP and strongly encouraged patient/family to review providers accepting new patients online at [facility]. Reviewed providers can be filtered by location, speciality, gender, etc. Patients daughter expressed understanding and shared she would review options with patient. Offered to send direct link to above site via Ghost. Jen in agreement with plan. Reviewed once a PCP is elected, patient can schedule a Annual Wellness Visit or [facility] Care visit to be seen. Patients daughter expressed understanding. [facility] informed Jen there is no consent to communicate (CTC) listed on file and strongly encouraged patient to complete one if wanted/needed. Reviewed CTC can be completed at upcoming appointment 11/9/2023. Patients daughter expressed understanding and shared she would update patient on above. Jen inquired if CTC are the same as Health Care Directives. [facility] reviewed the difference between the above documents. Jen expressed understanding. [facility] provided additional information/education surrounding Health Care Directives/Advance Care Planning. Jen voiced understanding. Offered to send patient/family additional information on Advance Care Planning via Ghost. Patients daughter in agreement with plan. Allowed time and space for Jen to voice any additional needs. No further needs identified at this time. Please refer to letter sent 11/7/2023 for further information regarding resources provided during this encounter. Jen thankful for  call and politely declined CC services. Preference is to contact CC if/when needs arise.     Resources and Interventions:  Current Resources:   Skilled Home Care Services: Skilled Nursing, Home Health Aid, Physical Therapy, Occupational Therapy  Community Resources: Meals on Wheels  Supplies Currently Used at Home: None  Equipment Currently Used at Home: walker, rolling  Employment Status: retired  Advance Care Plan/Directive  Advanced Care Plans/Directives on file:: No  Discussed with patient/caregiver:: Advanced Healthcare Directive planning document and instructions sent  Referrals Placed: [facility]     Care Plan: None     Future Appointments                In 2 days Laura Plata MD [place], [place]          Plan: Patients daughter was provided with SW CC's contact information and encouraged to call with questions, concerns, and/or support needs.  CC will remain available as needed. No further care coordination outreaches will be made at this time.     RIKKI Bass/LICSW  Social Work Care Coordinator  [facility], and [place]  Phone: [phone]

## 2023-11-07 NOTE — LETTER
[facility] CARE COORDINATION  [address]  [place] [facility] [address]    November 7, 2023    Debbie Boucher  [address]  [place] [facility] [place] [address]      Dear Debbie/Jen,    I am a clinic care coordinator who works with the [facility]. I recently tried to call and was unable to reach you. Below is a description of clinic care coordination and how I can further assist you shall any needs arise.       The clinic care coordination team is made up of a registered nurse, , financial resource worker and community health worker who understand the health care system. The goal of clinic care coordination is to help you manage your health and improve access to the health care system. Our team works alongside your provider to assist you in determining your health and social needs. We can help you obtain health care and community resources, providing you with necessary information and education. We can work with you through any barriers and develop a care plan that helps coordinate and strengthen the communication between you and your care team.  Our services are voluntary and are offered without charge to you personally.    Please feel free to contact me with any questions or concerns regarding care coordination and what we can offer.      We are focused on providing you with the highest-quality healthcare experience possible.    Sincerely,     RIKKI Bass/LICBRYAN  Social Work Care Coordinator  [facility] [place], [place], and [place]  Phone: [phone]

## 2023-11-07 NOTE — LETTER
[facility] CARE COORDINATION  [address]  [facility] [facility] [address]    November 7, 2023    Debbie Boucher  [address]  [facility] [place] [facility] [address]      Dear Debbie/Jen,    I wanted to thank you for spending the time to talk with me this afternoon. I have enclosed the resources we discussed over the phone below. Please review and call me with any questions.    Sincerely,     RIKKI Bass/[facility] Hospital[facility]AN  Social Work Care Coordinator  [facility] [place], [place], and [place]  Phone: [phone]          Resources    Honoring Choices:  Advance Care Planning and Health Care Directives [email or web address]  When it comes to decisions about your health care, it's important that your voice is heard. You may not always be able to speak for yourself.  We encourage you to have discussions with your loved ones, cultural or spiritual leaders and health care providers about your goals, values, beliefs and choices.  We are a part of [facility] [place] , supporting and promoting the benefits of advance care planning conversations.  Our goals are to:  Help you make informed decisions about your healthcare choices and ensure that those choices are honored  Offer advance care planning discussions with trained staff  Ensure your choices are clearly defined, documented and available in your medical record  Translate your choices into medical orders as needed  Why is Advance Care Planning important?  Know what your health care choices are and decide what is right for you  An unexpected illness or injury could leave you unable to participate in important treatment decisions  When choices are left to others to decide that responsibility can be difficult and stressful  By discussing and outlining your choices, your voice is heard in the care you want to receive  How can I learn more?  We offer free classes at multiple locations,  days and times. Our trained facilitators will provide information and guide you through a Health Care Directive document. They can also review, notarize and add your document to your medical record.  Call [employer] at [phone] or toll free at [phone] for assistance.  How do I document my choices?  There are now two options to share your treatment choices and name your health care agent. Download for free the [facility]oring Choices Health Care Directive (http://www.[email or web address].[email or web address]) or the one-page version, Honoring Choices Health Care Directive Short Form ([email or web address]). Use the document that best fits your needs. To make this document legal you will need to sign this in the presence of two witnesses who are not listed health care agents, or have it notarized. Use [email or web address] to locate a nearby notary.   More Resources to help with your Health Care Directive  To assist you in completing a Health Care Directive you may find the following resources helpful.  Health Care Goals Worksheet: This document will assist you in considering your values and goals and can be attached to a Health Care Directive. [email or web address]/[email or web address]/PDFs/Our-Community-Commitment/My-Health-Care-[email or web address]?la=en  Advance Care Planning Guide: Information about how to get started and completing a directive 635491.pdf ([employer])  Advance Care Planning Terms to Know: Explanation of commonly used Advance Care Planning terms 116581.pdf ([employer])  Health Care Agent: Information on choosing a health care agent and information for the person(s) you chose. 205118.pdf ([employer])  Honoring Choices--Your Rights: Explanation of your rights regarding informing others about your health care wishes and creating Advance directives http://[email or web address].pdf  Advance Care Planning Handout: Printable handout with information on how we can help you  "with Advance Care Planning and Health Care Directives https://www.[email or web address]/_/media/[employer]/PDFs/Our-Community-Commitment/Advance-Care-Planning-Bwofkjp8179.pdf?la=en      [facility] Providers:   Use the following link to search for Primary Care Providers within [facility] that are accepting new patients. [email or web address]  Once you locate a provider, call the clinic to schedule an appointment. You'll want to select an \"Establish Care\" or \"Annual Wellness Visit\" appointment type.  "

## 2023-11-09 ENCOUNTER — OFFICE VISIT (OUTPATIENT)
Dept: ENDOCRINOLOGY | Facility: CLINIC | Age: 78
End: 2023-11-09
Payer: MEDICARE

## 2023-11-09 VITALS
HEART RATE: 75 BPM | DIASTOLIC BLOOD PRESSURE: 68 MMHG | OXYGEN SATURATION: 94 % | BODY MASS INDEX: 16.24 KG/M2 | WEIGHT: 86 LBS | HEIGHT: 61 IN | RESPIRATION RATE: 14 BRPM | SYSTOLIC BLOOD PRESSURE: 142 MMHG | TEMPERATURE: 98.4 F

## 2023-11-09 DIAGNOSIS — S22.081A BURST FRACTURE OF T12 VERTEBRA (H): ICD-10-CM

## 2023-11-09 DIAGNOSIS — M81.0 OSTEOPOROSIS, UNSPECIFIED OSTEOPOROSIS TYPE, UNSPECIFIED PATHOLOGICAL FRACTURE PRESENCE: Primary | ICD-10-CM

## 2023-11-09 LAB
CALCIUM SERPL-MCNC: 9.1 MG/DL (ref 8.8–10.2)
CREAT SERPL-MCNC: 0.59 MG/DL (ref 0.51–0.95)
EGFRCR SERPLBLD CKD-EPI 2021: >90 ML/MIN/1.73M2
PTH-INTACT SERPL-MCNC: 48 PG/ML (ref 15–65)
VIT D+METAB SERPL-MCNC: 67 NG/ML (ref 20–50)

## 2023-11-09 PROCEDURE — 36415 COLL VENOUS BLD VENIPUNCTURE: CPT | Performed by: INTERNAL MEDICINE

## 2023-11-09 PROCEDURE — 82565 ASSAY OF CREATININE: CPT | Performed by: INTERNAL MEDICINE

## 2023-11-09 PROCEDURE — 82310 ASSAY OF CALCIUM: CPT | Performed by: INTERNAL MEDICINE

## 2023-11-09 PROCEDURE — 82306 VITAMIN D 25 HYDROXY: CPT | Performed by: INTERNAL MEDICINE

## 2023-11-09 PROCEDURE — 83970 ASSAY OF PARATHORMONE: CPT | Performed by: INTERNAL MEDICINE

## 2023-11-09 PROCEDURE — 99204 OFFICE O/P NEW MOD 45 MIN: CPT | Performed by: INTERNAL MEDICINE
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
## 2023-11-09 NOTE — PATIENT INSTRUCTIONS
[facility]  Dr Plata, Endocrinology Department    [facility]   [address] [facility]  [place] [address]  Appointment [phone]  Fax: [phone]  [place]: Monday - Thursday      Please check the cost coverage and copay with insurance before recommended tests, services and medications (especially if new medications are prescribed).     If ordered, please get blood work done 1 week prior to your next appointment so they will be available to Dr. Plata at your visit.    Labs today  DEXA needed  Follow up after that to discuss results and next steps.    The pt was advised to  Maintain an adequate calcium and vitamin D intake and to supplement vitamin D if needed to maintain serum levels of 25 hydroxy D (25 OH D) between 30-60 ng/ml.  Limit alcohol intake to no more than 2 servings per day.  Limit caffeine intake.  Maintain an active lifestyle including weight-bearing exercises for at least 30 mins daily.  Take measures to reduce the risk of falling.     You should get 1000- 1200 mg/day calcium in divided doses of no more than 500 mg/dose.  This INCLUDES what is in your food as well as what is in any supplements you may be taking.    Vit D about 800-1000 IU/day ( unless you have vit D deficiency- in that case higher dose)  Dietary sources of calcium:: These also contain vitamin D  Milk                            8 oz            300 mg calcium  Yogurt                          1 cup           400 mg calcium   Hard cheese                     1.5 oz          300 mg  Cottage cheese                  2 cup           300 mg  Orange juice with Calcium       8 oz            300 mg  Low fat dairy sources are recommended        You should get 30 minutes of moderate weight bearing exercise on most days of the week .  Weight bearing exercise includes such things as walking, jogging, hiking, dancing.  You should also get Strength training 2 or more times/week in  addition to other weight -being exercise. Strength training uses weight or resistance beyond that seen in everyday activities -(pilates, weight training with free weights, weight machines or resistance bands)     Living with Osteoporosis: Preventing Fractures  If you have osteoporosis, you can do a lot to reduce its effect on your life. Knowing how to prevent fractures and spinal curvature can help you live more comfortably and safely with this disease.  Reducing your risk for fractures  The most common fracture sites in people with osteoporosis are the wrist, spine, and hip. These fractures are often caused by accidents and falls. All fractures are painful and may limit what you can do. But hip fractures are very serious. They often need surgery, and it can take months to recover. To reduce your risk for fractures:  Get regular exercise. Try walking, swimming, or weight training.  Eat foods that are rich in calcium, or take calcium supplements.  Make your home safe to help avoid accidents.  Take extra precautions not to fall in risky areas, such as icy sidewalks.  Understanding spinal fractures  Your spine is made up of many bones called vertebrae. Osteoporosis can cause the vertebrae in your spine to collapse. As a result, your upper back may arch forward, creating a curvature. Spine fractures may also result from back strain and bad posture. You will also lose height. Your lower spine must then adjust to keep your body balanced. This can cause back pain. To prevent or lessen these spinal changes:  Practice good posture.  Use proper techniques if you need to lift heavy objects.  Do back exercises to help your posture.  Lie on your back when you have pain.  Ask your healthcare provider about these and other ways to help your spine.  V I O last reviewed this educational content on 5/1/2018 2000-2021 [employer]. All rights reserved. This information is not intended as a substitute for  professional medical care. Always follow your healthcare professional's instructions.          Living with Osteoporosis: Regular Exercise  If you have osteoporosis, exercise is vital for your health. It can prevent bone fractures and spine changes. It will slow bone loss. Exercise will strengthen your body. It can also be fun. A variety of exercises is best. See below for exercises that can help you. But before you start, talk with your healthcare provider to be sure these exercises are right for you.   Resistance exercises. These build muscle strength and maintain bone mass. They also make you less prone to injury. Exercises include lifting small weights, doing push-ups and sit-ups, using elastic exercise bands, and using weight machines.   Weight-bearing activities. These help your whole body. They also help you maintain bone mass. Activities include walking, dancing, and housework.   Non-weight-bearing exercises. These help prevent back strain and pain. They do this by building the trunk and leg muscles. Exercises that help with flexibility can prevent falls. Examples include swimming, water exercise, and stretching.   Staying safe  Here are tips to stay safe:   Always check with your healthcare provider before starting any new exercise program.  Use weights only as instructed.  Stop any exercise that causes pain.  [employer] last reviewed this educational content on 5/1/2018 2000-2021 [employer]. All rights reserved. This information is not intended as a substitute for professional medical care. Always follow your healthcare professional's instructions.          Preventing Osteoporosis: Avoiding Bone Loss  Certain factors can speed up bone loss or decrease bone growth. For example, alcohol, cigarettes, and certain medicines reduce bone mass. Some foods make it hard for your body to absorb calcium.  Things to avoid  Here are things to avoid to help prevent osteoporosis:  Alcohol. This is toxic to  bones. It is a major cause of bone loss. Heavy drinking can cause osteoporosis even if you have no other risk factors.  Smoking. This reduces bone mass. Smoking may also interfere with estrogen levels and cause early menopause.  Inactivity. Not being active makes your bones lose strength and become thinner. Over time, thin bones may break. Women who aren't active are at a high risk for osteoporosis.  Certain medicines. Some medicines, such as cortisone, increase bone loss. They also decrease bone growth. Ask your healthcare provider about any side effects of your medicines, and how to prevent them.  Protein-rich or salty foods. Eaten in large amounts, these foods may deplete calcium.  Caffeine. This increases calcium loss. People who drink a lot of coffee, tea, or soda lose more calcium than those who don't.  [employer] last reviewed this educational content on 5/1/2018 2000-2021 [employer]. All rights reserved. This information is not intended as a substitute for professional medical care. Always follow your healthcare professional's instructions.          Preventing Osteoporosis: Meeting Your Calcium Needs  Your body needs calcium to build and repair bones. But it can't make calcium on its own. That's why it's important to eat calcium-rich foods. Some foods are naturally rich in calcium. Others have calcium added (fortified). It's best to get calcium from the foods you eat. But if you can't get enough, you may want to take calcium supplements. To meet your daily calcium needs, try the foods listed below.                          Dairy Fish & beans Other sources   Source   Calcium (mg) per serving   Source   Calcium (mg) per serving   Source   Calcium (mg) per serving   Low-fat yogurt, plain   415 mg/8 oz.   Sardines, [place], canned, with bones   351 mg/3 oz.   Oatmeal, instant, fortified   215 mg/1 cup   Nonfat milk   302 mg/1 cup   [place], sockeye, canned, with bones   239 mg/3 oz.   Tofu made  with calcium sulfate   204 mg/3 oz.   Low-fat milk   297 mg/1 cup   Soybeans, fresh, boiled   131 mg/1/2 cup   Collards   179 mg/1/2 cup   Swiss cheese   272 mg/1 oz.   White beans, cooked   81 mg/1/2 cup   English muffin, whole wheat   175 mg/1 muffin   Cheddar cheese   205 mg/1 oz.   Navy beans, cooked   79 mg/1/2 cup   Kale   90 mg/1/2 cup   Ice cream strawberry   79 mg/1/2 cup           Orange, navel   56 mg/1 medium   Note: Calcium levels may vary depending on brand and size.  Daily calcium needs  14 to 18 years old: 1,300 mg  19 to 30 years old: 1,000 mg  31 to 50 years old: 1,000 mg  51 to 70 years old, women: 1,200 mg  51 to 70 years old, men: 1,000 mg  Pregnant or nursin to 18 years old: 1,300 mg, 19 to 50 years old: 1,000 mg  Older than 70 (women and men): 1,200 mg   [employer] last reviewed this educational content on 2018-2021 [employer]. All rights reserved. This information is not intended as a substitute for professional medical care. Always follow your healthcare professional's instructions.
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
## 2023-11-15 ENCOUNTER — TELEPHONE (OUTPATIENT)
Dept: ENDOCRINOLOGY | Facility: CLINIC | Age: 78
End: 2023-11-15
Payer: MEDICARE

## 2023-11-15 NOTE — TELEPHONE ENCOUNTER
Component      Latest Ref Rng 11/9/2023  12:13 PM   Creatinine      0.51 - 0.95 mg/dL 0.59    GFR Estimate      >60 mL/min/1.73m2 >90    Vitamin D, Total (25-Hydroxy)      20 - 50 ng/mL 67 (H)    Calcium      8.8 - 10.2 mg/dL 9.1    Parathyroid Hormone Intact      15 - 65 pg/mL 48       Labs in range except slightly elevated vitamin D levels  Recommend to decrease vitamin D by 20%.

## 2023-11-22 ENCOUNTER — OFFICE VISIT (OUTPATIENT)
Dept: FAMILY MEDICINE | Facility: CLINIC | Age: 78
End: 2023-11-22
Payer: MEDICARE

## 2023-11-22 VITALS
HEART RATE: 75 BPM | BODY MASS INDEX: 15.86 KG/M2 | OXYGEN SATURATION: 97 % | RESPIRATION RATE: 18 BRPM | TEMPERATURE: 95 F | DIASTOLIC BLOOD PRESSURE: 72 MMHG | WEIGHT: 84 LBS | SYSTOLIC BLOOD PRESSURE: 118 MMHG | HEIGHT: 61 IN

## 2023-11-22 DIAGNOSIS — Z11.59 NEED FOR HEPATITIS C SCREENING TEST: Primary | ICD-10-CM

## 2023-11-22 DIAGNOSIS — S72.001D CLOSED FRACTURE OF RIGHT HIP WITH ROUTINE HEALING, SUBSEQUENT ENCOUNTER: ICD-10-CM

## 2023-11-22 DIAGNOSIS — E46 MALNUTRITION, UNSPECIFIED TYPE (H): ICD-10-CM

## 2023-11-22 DIAGNOSIS — E03.9 HYPOTHYROIDISM, UNSPECIFIED TYPE: ICD-10-CM

## 2023-11-22 PROCEDURE — 99214 OFFICE O/P EST MOD 30 MIN: CPT | Mod: 25 | Performed by: NURSE PRACTITIONER

## 2023-11-22 PROCEDURE — G0008 ADMIN INFLUENZA VIRUS VAC: HCPCS | Performed by: NURSE PRACTITIONER

## 2023-11-22 PROCEDURE — 91320 SARSCV2 VAC 30MCG TRS-SUC IM: CPT | Performed by: NURSE PRACTITIONER

## 2023-11-22 PROCEDURE — 90662 IIV NO PRSV INCREASED AG IM: CPT | Performed by: NURSE PRACTITIONER

## 2023-11-22 PROCEDURE — 90480 ADMN SARSCOV2 VAC 1/ONLY CMP: CPT | Performed by: NURSE PRACTITIONER

## 2023-11-22 RX ORDER — METHOCARBAMOL 500 MG/1
500 TABLET, FILM COATED ORAL 4 TIMES DAILY PRN
Qty: 60 TABLET | Refills: 4 | Status: SHIPPED | OUTPATIENT
Start: 2023-11-22

## 2023-11-22 NOTE — PROGRESS NOTES
"  Assessment & Plan     Need for hepatitis C screening test  declined    Hypothyroidism, unspecified type  - monitor    Closed fracture of right hip with routine healing, subsequent encounter  Medication refilled. Continue to follow with endo for osteoporosis.   - methocarbamol (ROBAXIN) 500 MG tablet  Dispense: 60 tablet; Refill: 4             MED REC REQUIRED  Post Medication Reconciliation Status: discharge medications reconciled, continue medications without change      Nalini Duran NP  [facility] JACKY Lewis is a 78 year old, presenting for the following health issues:  Hospital F/U        11/22/2023     1:38 PM   Additional Questions   Roomed by Fercho izquierdo   Accompanied by daughter       HPI     Would like ears looked at, lost hearing, hearing aides also not working well    Saw endocrinology for osteoporosis, she had blood work done.     Hospital Follow-up Visit:    Hospital/Nursing Home/IP Rehab Facility: [facility]  Date of Admission: 9-25-23  Date of Discharge: 10-1-23  Reason(s) for Admission: Fall    Was your hospitalization related to COVID-19? No   Problems taking medications regularly:  None  Medication changes since discharge:  And iron qod  Problems adhering to non-medication therapy:  OT and PT.     Summary of hospitalization:  [facility] discharge summary reviewed    Diagnostic Tests/Treatments reviewed.  Follow up needed: DEXA scan  Other Healthcare Providers Involved in Patient s Care:         None  Update since discharge: improved.         Plan of care communicated with patient and family           Review of Systems   Constitutional, HEENT, cardiovascular, pulmonary, gi and gu systems are negative, except as otherwise noted.      Objective    /72 (BP Location: Right arm, Patient Position: Chair, Cuff Size: Adult Small)   Pulse 75   Temp (!) 95  F (35  C) (Oral)   Resp 18   Ht 1.549 m (5' 1\")   Wt 38.1 " kg (84 lb)   LMP  (LMP Unknown)   SpO2 97%   Breastfeeding No   BMI 15.87 kg/m    Body mass index is 15.87 kg/m .  Physical Exam   GENERAL: healthy, alert and no distress  EYES: Eyes grossly normal to inspection, PERRL and conjunctivae and sclerae normal  HENT: ear canals and TM's normal, nose and mouth without ulcers or lesions  NECK: no adenopathy, no asymmetry, masses, or scars and thyroid normal to palpation  RESP: lungs clear to auscultation - no rales, rhonchi or wheezes  CV: regular rate and rhythm, normal S1 S2, no S3 or S4, no murmur, click or rub, no peripheral edema and peripheral pulses strong  ABDOMEN: soft, nontender, no hepatosplenomegaly, no masses and bowel sounds normal  MS: no gross musculoskeletal defects noted, no edema

## 2023-11-27 ENCOUNTER — TELEPHONE (OUTPATIENT)
Dept: INTERNAL MEDICINE | Facility: CLINIC | Age: 78
End: 2023-11-27
Payer: MEDICARE

## 2023-11-27 NOTE — TELEPHONE ENCOUNTER
Fax received from [facility]. - [facility] 10/31/23 -12/29/23 / Order Nbr. [id number] for review and signature.  Put in Dr. Morfin's in basket.

## 2023-11-29 ENCOUNTER — TELEPHONE (OUTPATIENT)
Dept: FAMILY MEDICINE | Facility: CLINIC | Age: 78
End: 2023-11-29
Payer: MEDICARE

## 2023-11-29 NOTE — TELEPHONE ENCOUNTER
Received incoming call from [facility] (HOME CARE) that is asking if Ric Duran is willing to follow patient for home care orders. Patient's previous primary care provider, Tiago Tellez is no longer with [facility].    Please advise.    Thank you,  Milton Duckworth, Triage RN [facility]  2:22 PM 11/29/2023

## 2023-11-30 NOTE — TELEPHONE ENCOUNTER
Yes I would be fine with that, please call and let them know to send me to orders to sign  Nalini Duran NP on 11/30/2023 at 8:05 AM

## 2023-11-30 NOTE — TELEPHONE ENCOUNTER
Writer called Tana back. Left message from TREV Faulkner. Gave clinic fax and phone number as well.  is secure.

## 2023-12-01 NOTE — TELEPHONE ENCOUNTER
Forms not signed    Please forward the papers to Dr. Uzma Rockwell  The patient has hospital follow-up with Dr. Uzma Rockwell on October 25  Tiago Schuster does not work at this clinic anymore  Dr. Morfin has not seen the patient

## 2023-12-02 ENCOUNTER — HEALTH MAINTENANCE LETTER (OUTPATIENT)
Age: 78
End: 2023-12-02

## 2023-12-04 DIAGNOSIS — Z53.9 DIAGNOSIS NOT YET DEFINED: Primary | ICD-10-CM

## 2023-12-04 PROBLEM — E46 MALNUTRITION, UNSPECIFIED TYPE (H): Status: ACTIVE | Noted: 2023-12-04

## 2023-12-04 PROCEDURE — G0180 MD CERTIFICATION HHA PATIENT: HCPCS | Performed by: NURSE PRACTITIONER

## 2023-12-05 ENCOUNTER — ANCILLARY PROCEDURE (OUTPATIENT)
Dept: BONE DENSITY | Facility: CLINIC | Age: 78
End: 2023-12-05
Attending: INTERNAL MEDICINE
Payer: MEDICARE

## 2023-12-05 ENCOUNTER — ANCILLARY PROCEDURE (OUTPATIENT)
Dept: BONE DENSITY | Facility: CLINIC | Age: 78
End: 2023-12-05
Payer: MEDICARE

## 2023-12-05 DIAGNOSIS — M81.0 OSTEOPOROSIS, UNSPECIFIED OSTEOPOROSIS TYPE, UNSPECIFIED PATHOLOGICAL FRACTURE PRESENCE: ICD-10-CM

## 2023-12-05 DIAGNOSIS — S22.081A BURST FRACTURE OF T12 VERTEBRA (H): ICD-10-CM

## 2023-12-05 PROCEDURE — 77080 DXA BONE DENSITY AXIAL: CPT | Mod: TC | Performed by: PHYSICIAN ASSISTANT

## 2023-12-05 PROCEDURE — 77081 DXA BONE DENSITY APPENDICULR: CPT | Mod: TC | Performed by: PHYSICIAN ASSISTANT

## 2023-12-12 NOTE — RESULT ENCOUNTER NOTE
Debbie    Recently done endocrinology lab test/ imaging test showed:  Osteoporosis.  Please make follow up appointment to discuss results and next steps.    Please call endocrinology clinic ( [phone]) if questions.    Laura Plata MD  Endocrinology   [facility]  December 11, 2023

## 2024-01-03 ENCOUNTER — MEDICAL CORRESPONDENCE (OUTPATIENT)
Dept: HEALTH INFORMATION MANAGEMENT | Facility: CLINIC | Age: 79
End: 2024-01-03
Payer: MEDICARE

## 2024-01-11 ENCOUNTER — MEDICAL CORRESPONDENCE (OUTPATIENT)
Dept: HEALTH INFORMATION MANAGEMENT | Facility: CLINIC | Age: 79
End: 2024-01-11
Payer: MEDICARE

## 2024-04-11 ENCOUNTER — OFFICE VISIT (OUTPATIENT)
Dept: ENDOCRINOLOGY | Facility: CLINIC | Age: 79
End: 2024-04-11
Payer: MEDICARE

## 2024-04-11 ENCOUNTER — TELEPHONE (OUTPATIENT)
Dept: ENDOCRINOLOGY | Facility: CLINIC | Age: 79
End: 2024-04-11

## 2024-04-11 VITALS
BODY MASS INDEX: 17.94 KG/M2 | WEIGHT: 95 LBS | RESPIRATION RATE: 16 BRPM | SYSTOLIC BLOOD PRESSURE: 122 MMHG | DIASTOLIC BLOOD PRESSURE: 68 MMHG | HEIGHT: 61 IN | HEART RATE: 71 BPM | TEMPERATURE: 97.9 F | OXYGEN SATURATION: 98 %

## 2024-04-11 DIAGNOSIS — M81.0 OSTEOPOROSIS, UNSPECIFIED OSTEOPOROSIS TYPE, UNSPECIFIED PATHOLOGICAL FRACTURE PRESENCE: Primary | ICD-10-CM

## 2024-04-11 PROCEDURE — G2211 COMPLEX E/M VISIT ADD ON: HCPCS | Performed by: INTERNAL MEDICINE

## 2024-04-11 PROCEDURE — 99214 OFFICE O/P EST MOD 30 MIN: CPT | Performed by: INTERNAL MEDICINE
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
## 2024-04-11 NOTE — TELEPHONE ENCOUNTER
Patient said Teriparatide is covered and they'd like to go ahead with it.    Nasreen Boucher CMA  [facility] Endocrinology [place]  [phone]

## 2024-04-11 NOTE — PATIENT INSTRUCTIONS
[facility]  Dr Plata, Endocrinology Department    [facility]   [address] [facility]  [place] [address]  Appointment [phone]  Fax: [phone]  [place]: Monday - Thursday      Please check the cost coverage and copay with insurance before recommended tests, services and medications (especially if new medications are prescribed).     If ordered, please get blood work done 1 week prior to your next appointment so they will be available to Dr. Plata at your visit.    Check cost of Forteo, Teriparatide or Tymlos-- if you want to start this medication after talking to medication, please inform us.  Other option is Reclast.  Inform us once you decide about the treatment option.  DEXA 2025.  Follow up in 1 year.    Reclast: it is once a year infusion.  Treatment with bisphosphonate therapy will decrease fracture risk 50-70%.   There is risk of osteonecrosis of the jaw in patients using bisphosphonates is approximately 1700-1/100,000, with development most likely related to invasive dental procedures. If an invasive dental procedure was necessary, preferably stop the bisphosphonate approximately 3 months prior to reduce the risk. Let your dentist know that you are on this medication.  The data available at this time suggests that there is probably a small increase risk of atypical (nontraumatic) subtrochanteric fractures of the femur in patients on bisphosphonate therapy compared to those not on it. One large study suggested that for every 100 fractures prevented with bisphosphonate therapy, less than one femur fracture will occur. Other studies suggest one episode per 2,500 patient years. Patient should call with leg pain.        Infusion center- [place] [facility].                [phone]   Infusion center- [facility].                [phone]     Please call infusion center to schedule the treatment/ test  discussed.    The pt was advised to  Maintain an adequate calcium and vitamin D intake and to supplement vitamin D if needed to maintain serum levels of 25 hydroxy D (25 OH D) between 30-60 ng/ml.  Limit alcohol intake to no more than 2 servings per day.  Limit caffeine intake.  Maintain an active lifestyle including weight-bearing exercises for at least 30 mins daily.  Take measures to reduce the risk of falling.     You should get 1000- 1200 mg/day calcium in divided doses of no more than 500 mg/dose.  This INCLUDES what is in your food as well as what is in any supplements you may be taking.    Vit D about 800-1000 IU/day ( unless you have vit D deficiency- in that case higher dose)  Dietary sources of calcium:: These also contain vitamin D  Milk                            8 oz            300 mg calcium  Yogurt                          1 cup           400 mg calcium   Hard cheese                     1.5 oz          300 mg  Cottage cheese                  2 cup           300 mg  Orange juice with Calcium       8 oz            300 mg  Low fat dairy sources are recommended        You should get 30 minutes of moderate weight bearing exercise on most days of the week .  Weight bearing exercise includes such things as walking, jogging, hiking, dancing.  You should also get Strength training 2 or more times/week in addition to other weight -being exercise. Strength training uses weight or resistance beyond that seen in everyday activities -(pilates, weight training with free weights, weight machines or resistance bands)     Living with Osteoporosis: Preventing Fractures  If you have osteoporosis, you can do a lot to reduce its effect on your life. Knowing how to prevent fractures and spinal curvature can help you live more comfortably and safely with this disease.  Reducing your risk for fractures  The most common fracture sites in people with osteoporosis are the wrist, spine, and hip. These fractures are often caused  by accidents and falls. All fractures are painful and may limit what you can do. But hip fractures are very serious. They often need surgery, and it can take months to recover. To reduce your risk for fractures:  Get regular exercise. Try walking, swimming, or weight training.  Eat foods that are rich in calcium, or take calcium supplements.  Make your home safe to help avoid accidents.  Take extra precautions not to fall in risky areas, such as icy sidewalks.  Understanding spinal fractures  Your spine is made up of many bones called vertebrae. Osteoporosis can cause the vertebrae in your spine to collapse. As a result, your upper back may arch forward, creating a curvature. Spine fractures may also result from back strain and bad posture. You will also lose height. Your lower spine must then adjust to keep your body balanced. This can cause back pain. To prevent or lessen these spinal changes:  Practice good posture.  Use proper techniques if you need to lift heavy objects.  Do back exercises to help your posture.  Lie on your back when you have pain.  Ask your healthcare provider about these and other ways to help your spine.  [employer] last reviewed this educational content on 5/1/2018 2000-2021 [employer]. All rights reserved. This information is not intended as a substitute for professional medical care. Always follow your healthcare professional's instructions.          Living with Osteoporosis: Regular Exercise  If you have osteoporosis, exercise is vital for your health. It can prevent bone fractures and spine changes. It will slow bone loss. Exercise will strengthen your body. It can also be fun. A variety of exercises is best. See below for exercises that can help you. But before you start, talk with your healthcare provider to be sure these exercises are right for you.   Resistance exercises. These build muscle strength and maintain bone mass. They also make you less prone to injury.  Exercises include lifting small weights, doing push-ups and sit-ups, using elastic exercise bands, and using weight machines.   Weight-bearing activities. These help your whole body. They also help you maintain bone mass. Activities include walking, dancing, and housework.   Non-weight-bearing exercises. These help prevent back strain and pain. They do this by building the trunk and leg muscles. Exercises that help with flexibility can prevent falls. Examples include swimming, water exercise, and stretching.   Staying safe  Here are tips to stay safe:   Always check with your healthcare provider before starting any new exercise program.  Use weights only as instructed.  Stop any exercise that causes pain.  [employer] last reviewed this educational content on 5/1/2018 2000-2021 [employer]. All rights reserved. This information is not intended as a substitute for professional medical care. Always follow your healthcare professional's instructions.          Preventing Osteoporosis: Avoiding Bone Loss  Certain factors can speed up bone loss or decrease bone growth. For example, alcohol, cigarettes, and certain medicines reduce bone mass. Some foods make it hard for your body to absorb calcium.  Things to avoid  Here are things to avoid to help prevent osteoporosis:  Alcohol. This is toxic to bones. It is a major cause of bone loss. Heavy drinking can cause osteoporosis even if you have no other risk factors.  Smoking. This reduces bone mass. Smoking may also interfere with estrogen levels and cause early menopause.  Inactivity. Not being active makes your bones lose strength and become thinner. Over time, thin bones may break. Women who aren't active are at a high risk for osteoporosis.  Certain medicines. Some medicines, such as cortisone, increase bone loss. They also decrease bone growth. Ask your healthcare provider about any side effects of your medicines, and how to prevent them.  Protein-rich or  salty foods. Eaten in large amounts, these foods may deplete calcium.  Caffeine. This increases calcium loss. People who drink a lot of coffee, tea, or soda lose more calcium than those who don't.  [employer] last reviewed this educational content on 5/1/2018 2000-2021 [employer]. All rights reserved. This information is not intended as a substitute for professional medical care. Always follow your healthcare professional's instructions.          Preventing Osteoporosis: Meeting Your Calcium Needs  Your body needs calcium to build and repair bones. But it can't make calcium on its own. That's why it's important to eat calcium-rich foods. Some foods are naturally rich in calcium. Others have calcium added (fortified). It's best to get calcium from the foods you eat. But if you can't get enough, you may want to take calcium supplements. To meet your daily calcium needs, try the foods listed below.                          Dairy Fish & beans Other sources   Source   Calcium (mg) per serving   Source   Calcium (mg) per serving   Source   Calcium (mg) per serving   Low-fat yogurt, plain   415 mg/8 oz.   Sardines, Atlantic, canned, with bones   351 mg/3 oz.   Oatmeal, instant, fortified   215 mg/1 cup   Nonfat milk   302 mg/1 cup   [place], sockeye, canned, with bones   239 mg/3 oz.   Tofu made with calcium sulfate   204 mg/3 oz.   Low-fat milk   297 mg/1 cup   Soybeans, fresh, boiled   131 mg/1/2 cup   Collards   179 mg/1/2 cup   Swiss cheese   272 mg/1 oz.   White beans, cooked   81 mg/1/2 cup   English muffin, whole wheat   175 mg/1 muffin   Cheddar cheese   205 mg/1 oz.   Navy beans, cooked   79 mg/1/2 cup   Kale   90 mg/1/2 cup   Ice cream strawberry   79 mg/1/2 cup           Orange, navel   56 mg/1 medium   Note: Calcium levels may vary depending on brand and size.  Daily calcium needs  14 to 18 years old: 1,300 mg  19 to 30 years old: 1,000 mg  31 to 50 years old: 1,000 mg  51 to 70 years old, women:  1,200 mg  51 to 70 years old, men: 1,000 mg  Pregnant or nursin to 18 years old: 1,300 mg, 19 to 50 years old: 1,000 mg  Older than 70 (women and men): 1,200 mg   [employer] last reviewed this educational content on 2018-2021 [employer]. All rights reserved. This information is not intended as a substitute for professional medical care. Always follow your healthcare professional's instructions.

## 2024-04-15 NOTE — TELEPHONE ENCOUNTER
Ok for teriparatide.  Please send Rx.  Follow up in 1 year.    Possible side effect include hypercalcemia--high calcium levels  Orthostatic hypotension--low blood pressure  Dizziness, insomnia, anxiety, vertigo, nausea, gastritis, herpes zoster, muscle pain, joint pains, rhinitis are other possible side effects.

## 2024-04-15 NOTE — TELEPHONE ENCOUNTER
Please schedule an appt in one year and I will then send the RX.  Pt also needs a lab appt in 2 mo from now.

## 2024-04-16 RX ORDER — TERIPARATIDE 250 UG/ML
20 INJECTION, SOLUTION SUBCUTANEOUS DAILY
Qty: 2.48 ML | Refills: 6 | Status: SHIPPED | OUTPATIENT
Start: 2024-04-16

## 2024-04-16 NOTE — TELEPHONE ENCOUNTER
Patient notified and appt [facility].    She states she will need a PA for this medication.    Nasreen Boucher [facility]  [phone]

## 2024-04-17 NOTE — TELEPHONE ENCOUNTER
PA Initiation    Medication: TERIPARATIDE (RECOMBINANT) 620 MCG/2.48ML SC SOPN  Insurance Company: [employer] Part D - Phone [phone] Fax [phone]  Pharmacy Filling the Rx:    Filling Pharmacy Phone:    Filling Pharmacy Fax:    Start Date: 4/17/2024

## 2024-04-17 NOTE — TELEPHONE ENCOUNTER
Prior Authorization Approval    Medication: TERIPARATIDE (RECOMBINANT) 620 MCG/2.48ML SC SOPN  Authorization Effective Date: 1/1/2024  Authorization Expiration Date: 4/7/2026  Approved Dose/Quantity: 2.48ml per 28 day  Reference #: [id number]   Insurance Company: [employer] Part D - Phone [phone] Fax [phone]  Expected CoPay: $ 963.5  CoPay Card Available: No    Financial Assistance Needed:   Which Pharmacy is filling the prescription:    Pharmacy Notified:   Patient Notified: sent mychart to patient

## 2024-05-06 ENCOUNTER — TELEPHONE (OUTPATIENT)
Dept: ENDOCRINOLOGY | Facility: CLINIC | Age: 79
End: 2024-05-06
Payer: MEDICARE

## 2024-05-06 DIAGNOSIS — M81.0 OSTEOPOROSIS, UNSPECIFIED OSTEOPOROSIS TYPE, UNSPECIFIED PATHOLOGICAL FRACTURE PRESENCE: Primary | ICD-10-CM

## 2024-05-06 NOTE — TELEPHONE ENCOUNTER
You didn't mention evenity at the visit, only forteo or reclast.  They'd like the reclast.    Nasreen Boucher [facility] [place]  [phone]

## 2024-05-06 NOTE — TELEPHONE ENCOUNTER
Evenity is another medication that can be considered in severe osteoporosis.  If covered, it will be more beneficial as compared to Reclast.  Its monthly injection in clinic for 12 months

## 2024-05-06 NOTE — TELEPHONE ENCOUNTER
The copay for forteo is too expensive so the patient would like to proceed with the infusion, reclast.  Please order.    Nasreen Boucher [facility]  [phone]

## 2024-05-07 NOTE — TELEPHONE ENCOUNTER
Ok, please order evenity.    Nasreen Boucher [facility] [place]  [phone]

## 2024-05-07 NOTE — TELEPHONE ENCOUNTER
Spoke with daughter and she will discuss with patient and get back to us.    Nasreen Boucher CMA  [facility] [place]  [phone]

## 2024-05-13 NOTE — TELEPHONE ENCOUNTER
Patient's daughter, Jen called back to schedule and per our protocols, Marlo does not do Evenity injections. Per protocols, writer was to transfer her to infusion center, but Jen sounded unsure so writer stated she'd have the clinic call her to discuss further.

## 2024-05-14 ENCOUNTER — TELEPHONE (OUTPATIENT)
Dept: ENDOCRINOLOGY | Facility: CLINIC | Age: 79
End: 2024-05-14
Payer: MEDICARE

## 2024-05-14 NOTE — TELEPHONE ENCOUNTER
Prior Authorization Retail Medication Request    Medication/Dose:   Diagnosis and ICD code (if different than what is on RX):  Evenity  New/renewal/insurance change PA/secondary ins. PA:  Previously Tried and Failed:    Rationale:

## 2024-05-21 ENCOUNTER — DOCUMENTATION ONLY (OUTPATIENT)
Dept: LAB | Facility: CLINIC | Age: 79
End: 2024-05-21
Payer: MEDICARE

## 2024-05-21 DIAGNOSIS — M81.0 OSTEOPOROSIS, UNSPECIFIED OSTEOPOROSIS TYPE, UNSPECIFIED PATHOLOGICAL FRACTURE PRESENCE: Primary | ICD-10-CM

## 2024-05-21 NOTE — PROGRESS NOTES
Per notes  2.  Hypervitaminosis D:  Noted to have high vitamin D D levels in 11/2023 labs  History of gastric bypass.  She has decreased dose slightly since the last labs.  Plan to continue monitor and recheck labs in few months.

## 2024-06-04 ENCOUNTER — TELEPHONE (OUTPATIENT)
Dept: ENDOCRINOLOGY | Facility: CLINIC | Age: 79
End: 2024-06-04

## 2024-06-04 ENCOUNTER — ALLIED HEALTH/NURSE VISIT (OUTPATIENT)
Dept: NURSING | Facility: CLINIC | Age: 79
End: 2024-06-04
Payer: MEDICARE

## 2024-06-04 DIAGNOSIS — M81.0 OSTEOPOROSIS, UNSPECIFIED OSTEOPOROSIS TYPE, UNSPECIFIED PATHOLOGICAL FRACTURE PRESENCE: Primary | ICD-10-CM

## 2024-06-04 PROCEDURE — 99207 PR NO CHARGE NURSE ONLY: CPT

## 2024-06-04 NOTE — PROGRESS NOTES
"Clinic Administered Medication Documentation      Injectable Medication Documentation    Is there an active order (written within the past 365 days, with administrations remaining, not ) in the chart? Yes.     Patient was given  Evenity . Prior to medication administration, verified patient's identity using patient s name and date of birth. Please see MAR and medication order for additional information. Patient instructed to {CAM INSTRUCTIONS:329475::\"remain in clinic for 15 minutes\",\"report any adverse reaction to staff immediately\"}.    Vial/Syringe: Syringe  Was this medication supplied by the patient? No  Is this a medication the patient will need to receive again? Yes. Verified that the patient has refills remaining in their prescription.    Daughter states that she wanted to know if             Prolia may cause lower levels of calcium in the blood. Some symptoms include: muscle twitching, spasms or cramping or numbness/tingling around your fingers, toes or mouth. Prolia may worsen pre-existing hypocalcemia.    Prolia treatment has been associated with severe hypocalcemia in patients with certain risk factors including:  - CKD 4-5 (GFR less than 30)  - Status post bariatric surgery  - Malabsorption/malnutrition/short gut syndrome  - Cystic Fibrosis  - History of hypocalcemia    Osteonecrosis is a sore in the mouth in which the jawbone is visible. Most common risk factor is dental extraction.   Patient should maintain good mouth care including: Regular dentist cleanings and check ups and notifying their dentist that they are on Prolia.    Atypical femur fractures - Some patients have developed unusual fracture in their thigh bone. Symptoms include: new or unusual pain in hip, thigh, or groin.     Increased risk for infections - sites of potential areas include: skin, lower stomach area, bladder, ear or inflammation of inner lining of the heart (endocarditis).     Skin problems - Dermatitis (redness, " itching) or eczema (leathery dry skin, blisters that can ooze or crust, skin peeling).         Prolia injection should be scheduled, then it will trigger the prior authorization.

## 2024-06-04 NOTE — PROGRESS NOTES
Patient arrived to clinic today with daughter to get her first Evenity injection. Patient and daughter were informed that medication needed to warm up for 30 minutes prior to giving the injection, daughter was not aware of this and was irritated about the wait.    Patient and daughter were brought into triage room with medication to address questions and education about the medication. Daughter asked about prior authorization status and additional cost involved. This writer relayed that prior authorization was not needed (per TE from 05/14/2024 and note on referral from CAM finance that prior authorization not required.        Daughter advised that any financial questions with cost would need to be asked with insurance. Patient's daughter called insurance who stated that Evenity needed a prior authorization. Daughter wasn't comfortable proceeding with injection due to conflicting info. Daughter asked this writer about alternative injections such as Prolia. This writer relayed Prolia was another option for osteoporosis, but they would need to discuss this option with Dr. Higuera.    Daughter states she liked the convenience of Prolia getting it every 6 months versus doing Evenity every month for 12 months (noted in TE 05/06/2024). Daughter feels they weren't educated enough about their options for osteoporosis treatment and financial cost is a big concern.    Advised patient and daughter not to get Evenity at this time and proceed with setting up visit (video/telephone) to discuss osteoporosis meds more in depth with provider to ensure questions are answered correctly. Also advised to try to contact insurance to get detailed info about osteoporosis injection medication coverage info.     This writer will notify endo team about patient and daughter's concerns regarding financial coverage and options. Will also reach out to CAM finance team to see if they can help address info regarding prior authorization needed for  Evenity.    Medication NOT given with plan to follow up with Dr. Higuera and insurance.     Thank you,  Milton Duckworth, Triage RN [facility]  11:50 AM 6/4/2024

## 2024-06-04 NOTE — TELEPHONE ENCOUNTER
Patient arrived to clinic today with daughter to get her first Evenity injection. Patient and daughter were informed that medication needed to warm up for 30 minutes prior to giving the injection, daughter was not aware of this and was irritated about the wait.     Patient and daughter were brought into triage room with medication to address questions and education about the medication. Daughter asked about prior authorization status and additional cost involved. This writer relayed that prior authorization was not needed (per TE from 05/14/2024 and note on referral from CAM finance that prior authorization not required.          Daughter advised that any financial questions with cost would need to be asked with insurance. Patient's daughter called insurance who stated that Evenity needed a prior authorization. Daughter wasn't comfortable proceeding with injection due to conflicting info. Daughter asked this writer about alternative injections such as Prolia. This writer relayed Prolia was another option for osteoporosis, but they would need to discuss this option with Dr. Higuera.     Daughter states she liked the convenience of Prolia getting it every 6 months versus doing Evenity every month for 12 months (noted in TE 05/06/2024). Daughter feels they weren't educated enough about their options for osteoporosis treatment and financial cost is a big concern.     Advised patient and daughter not to get Evenity at this time and proceed with setting up visit (video/telephone) to discuss osteoporosis meds more in depth with provider to ensure questions are answered correctly. Also advised to try to contact insurance to get detailed info about osteoporosis injection medication coverage info.      This writer will notify endo team about patient and daughter's concerns regarding financial coverage and options. Will also reach out to CAM finance team to see if they can help address info regarding prior authorization  needed for Evenity.     Medication NOT given with plan to follow up with Dr. Higuera and insurance.      Thank you,  Milton Duckworth, Triage RN [facility]  11:50 AM 6/4/2024

## 2024-06-05 NOTE — TELEPHONE ENCOUNTER
Called pt. Not able to reach. Left   Called daughter.  Forteo- not covered.  Eveneity-- not sure if PA is approved.  At this time she would said that her mother want Prolia.  It also easy from administration standpoint.  The patient's daughter may be speaking to someone who handles the Part D portion which is different.   She will inform us when she knows about the cost of Evenity or Prolia.  Based on that consider neck step.     All questions were answered.  The patient indicates understanding of the above issues and agrees with the plan set forth.

## 2024-06-18 ENCOUNTER — MYC MEDICAL ADVICE (OUTPATIENT)
Dept: FAMILY MEDICINE | Facility: CLINIC | Age: 79
End: 2024-06-18
Payer: MEDICARE

## 2024-06-18 ENCOUNTER — TELEPHONE (OUTPATIENT)
Dept: FAMILY MEDICINE | Facility: CLINIC | Age: 79
End: 2024-06-18
Payer: MEDICARE

## 2024-06-18 NOTE — TELEPHONE ENCOUNTER
Patient Quality Outreach    Patient is due for the following:   Physical Annual Wellness Visit    Next Steps:   Schedule a Annual Wellness Visit    Type of outreach:    Sent [facility] message.      Questions for provider review:    None           Joie Pickens CMA

## 2024-12-12 ENCOUNTER — OFFICE VISIT (OUTPATIENT)
Dept: FAMILY MEDICINE | Facility: CLINIC | Age: 79
End: 2024-12-12
Payer: MEDICARE

## 2024-12-12 VITALS
HEART RATE: 72 BPM | DIASTOLIC BLOOD PRESSURE: 72 MMHG | WEIGHT: 104.7 LBS | SYSTOLIC BLOOD PRESSURE: 187 MMHG | BODY MASS INDEX: 19.77 KG/M2 | HEIGHT: 61 IN | RESPIRATION RATE: 16 BRPM | TEMPERATURE: 98.1 F | OXYGEN SATURATION: 100 %

## 2024-12-12 DIAGNOSIS — R53.82 CHRONIC FATIGUE: ICD-10-CM

## 2024-12-12 DIAGNOSIS — M25.551 HIP PAIN, RIGHT: ICD-10-CM

## 2024-12-12 DIAGNOSIS — Z00.00 ENCOUNTER FOR MEDICARE ANNUAL WELLNESS EXAM: Primary | ICD-10-CM

## 2024-12-12 DIAGNOSIS — Z98.84 HISTORY OF GASTRIC BYPASS: ICD-10-CM

## 2024-12-12 DIAGNOSIS — F51.01 PRIMARY INSOMNIA: ICD-10-CM

## 2024-12-12 DIAGNOSIS — K21.00 GASTROESOPHAGEAL REFLUX DISEASE WITH ESOPHAGITIS WITHOUT HEMORRHAGE: ICD-10-CM

## 2024-12-12 DIAGNOSIS — M81.0 OSTEOPOROSIS, UNSPECIFIED OSTEOPOROSIS TYPE, UNSPECIFIED PATHOLOGICAL FRACTURE PRESENCE: ICD-10-CM

## 2024-12-12 DIAGNOSIS — F33.1 MODERATE EPISODE OF RECURRENT MAJOR DEPRESSIVE DISORDER (H): ICD-10-CM

## 2024-12-12 DIAGNOSIS — K59.1 FUNCTIONAL DIARRHEA: ICD-10-CM

## 2024-12-12 DIAGNOSIS — E03.9 ACQUIRED HYPOTHYROIDISM: ICD-10-CM

## 2024-12-12 DIAGNOSIS — I10 BENIGN ESSENTIAL HYPERTENSION: ICD-10-CM

## 2024-12-12 DIAGNOSIS — Z13.6 CARDIOVASCULAR SCREENING; LDL GOAL LESS THAN 130: ICD-10-CM

## 2024-12-12 LAB
ALBUMIN SERPL BCG-MCNC: 4.1 G/DL (ref 3.5–5.2)
ALP SERPL-CCNC: 66 U/L (ref 40–150)
ALT SERPL W P-5'-P-CCNC: 15 U/L (ref 0–50)
ANION GAP SERPL CALCULATED.3IONS-SCNC: 11 MMOL/L (ref 7–15)
AST SERPL W P-5'-P-CCNC: 22 U/L (ref 0–45)
BILIRUB SERPL-MCNC: 0.3 MG/DL
BUN SERPL-MCNC: 11.2 MG/DL (ref 8–23)
CALCIUM SERPL-MCNC: 9 MG/DL (ref 8.8–10.4)
CHLORIDE SERPL-SCNC: 102 MMOL/L (ref 98–107)
CREAT SERPL-MCNC: 0.84 MG/DL (ref 0.51–0.95)
EGFRCR SERPLBLD CKD-EPI 2021: 71 ML/MIN/1.73M2
ERYTHROCYTE [DISTWIDTH] IN BLOOD BY AUTOMATED COUNT: 12.1 % (ref 10–15)
FERRITIN SERPL-MCNC: 148 NG/ML (ref 11–328)
GLUCOSE SERPL-MCNC: 96 MG/DL (ref 70–99)
HCO3 SERPL-SCNC: 27 MMOL/L (ref 22–29)
HCT VFR BLD AUTO: 37.5 % (ref 35–47)
HGB BLD-MCNC: 12.3 G/DL (ref 11.7–15.7)
HOLD SPECIMEN: NORMAL
IRON BINDING CAPACITY (ROCHE): 246 UG/DL (ref 240–430)
IRON SATN MFR SERPL: 43 % (ref 15–46)
IRON SERPL-MCNC: 107 UG/DL (ref 37–145)
MCH RBC QN AUTO: 34.6 PG (ref 26.5–33)
MCHC RBC AUTO-ENTMCNC: 32.8 G/DL (ref 31.5–36.5)
MCV RBC AUTO: 106 FL (ref 78–100)
PLATELET # BLD AUTO: 198 10E3/UL (ref 150–450)
POTASSIUM SERPL-SCNC: 3.9 MMOL/L (ref 3.4–5.3)
PROT SERPL-MCNC: 6.3 G/DL (ref 6.4–8.3)
RBC # BLD AUTO: 3.55 10E6/UL (ref 3.8–5.2)
SODIUM SERPL-SCNC: 140 MMOL/L (ref 135–145)
T4 FREE SERPL-MCNC: 0.29 NG/DL (ref 0.9–1.7)
TSH SERPL DL<=0.005 MIU/L-ACNC: 89.2 UIU/ML (ref 0.3–4.2)
VIT B12 SERPL-MCNC: 986 PG/ML (ref 232–1245)
VIT D+METAB SERPL-MCNC: 30 NG/ML (ref 20–50)
WBC # BLD AUTO: 4 10E3/UL (ref 4–11)

## 2024-12-12 RX ORDER — FLUOXETINE 40 MG/1
40 CAPSULE ORAL DAILY
Qty: 90 CAPSULE | Refills: 3 | Status: SHIPPED | OUTPATIENT
Start: 2024-12-12

## 2024-12-12 RX ORDER — BUPROPION HYDROCHLORIDE 150 MG/1
150 TABLET ORAL EVERY MORNING
Qty: 90 TABLET | Refills: 3 | Status: SHIPPED | OUTPATIENT
Start: 2024-12-12

## 2024-12-12 RX ORDER — OMEPRAZOLE 40 MG/1
40 CAPSULE, DELAYED RELEASE ORAL DAILY
Qty: 90 CAPSULE | Refills: 3 | Status: SHIPPED | OUTPATIENT
Start: 2024-12-12

## 2024-12-12 RX ORDER — LEVOTHYROXINE SODIUM 75 UG/1
75 TABLET ORAL DAILY
Qty: 90 TABLET | Refills: 3 | Status: SHIPPED | OUTPATIENT
Start: 2024-12-12

## 2024-12-12 RX ORDER — LISINOPRIL 20 MG/1
20 TABLET ORAL DAILY
Qty: 90 TABLET | Refills: 3 | Status: SHIPPED | OUTPATIENT
Start: 2024-12-12

## 2024-12-12 RX ORDER — TRAZODONE HYDROCHLORIDE 100 MG/1
100 TABLET ORAL AT BEDTIME
Qty: 90 TABLET | Refills: 3 | Status: SHIPPED | OUTPATIENT
Start: 2024-12-12

## 2024-12-12 SDOH — HEALTH STABILITY: PHYSICAL HEALTH: ON AVERAGE, HOW MANY DAYS PER WEEK DO YOU ENGAGE IN MODERATE TO STRENUOUS EXERCISE (LIKE A BRISK WALK)?: 0 DAYS

## 2024-12-12 ASSESSMENT — PATIENT HEALTH QUESTIONNAIRE - PHQ9
10. IF YOU CHECKED OFF ANY PROBLEMS, HOW DIFFICULT HAVE THESE PROBLEMS MADE IT FOR YOU TO DO YOUR WORK, TAKE CARE OF THINGS AT HOME, OR GET ALONG WITH OTHER PEOPLE: NOT DIFFICULT AT ALL
SUM OF ALL RESPONSES TO PHQ QUESTIONS 1-9: 4
SUM OF ALL RESPONSES TO PHQ QUESTIONS 1-9: 4

## 2024-12-12 ASSESSMENT — SOCIAL DETERMINANTS OF HEALTH (SDOH): HOW OFTEN DO YOU GET TOGETHER WITH FRIENDS OR RELATIVES?: ONCE A WEEK

## 2024-12-12 NOTE — PATIENT INSTRUCTIONS
Patient Education   Preventive Care Advice   This is general advice given by our system to help you stay healthy. However, your care team may have specific advice just for you. Please talk to your care team about your preventive care needs.  Nutrition  Eat 5 or more servings of fruits and vegetables each day.  Try wheat bread, brown rice and whole grain pasta (instead of white bread, rice, and pasta).  Get enough calcium and vitamin D. Check the label on foods and aim for 100% of the RDA (recommended daily allowance).  Lifestyle  Exercise at least 150 minutes each week  (30 minutes a day, 5 days a week).  Do muscle strengthening activities 2 days a week. These help control your weight and prevent disease.  No smoking.  Wear sunscreen to prevent skin cancer.  Have a dental exam and cleaning every 6 months.  Yearly exams  See your health care team every year to talk about:  Any changes in your health.  Any medicines your care team has prescribed.  Preventive care, family planning, and ways to prevent chronic diseases.  Shots (vaccines)   HPV shots (up to age 26), if you've never had them before.  Hepatitis B shots (up to age 59), if you've never had them before.  COVID-19 shot: Get this shot when it's due.  Flu shot: Get a flu shot every year.  Tetanus shot: Get a tetanus shot every 10 years.  Pneumococcal, hepatitis A, and RSV shots: Ask your care team if you need these based on your risk.  Shingles shot (for age 50 and up)  General health tests  Diabetes screening:  Starting at age 35, Get screened for diabetes at least every 3 years.  If you are younger than age 35, ask your care team if you should be screened for diabetes.  Cholesterol test: At age 39, start having a cholesterol test every 5 years, or more often if advised.  Bone density scan (DEXA): At age 50, ask your care team if you should have this scan for osteoporosis (brittle bones).  Hepatitis C: Get tested at least once in your life.  STIs (sexually  transmitted infections)  Before age 24: Ask your care team if you should be screened for STIs.  After age 24: Get screened for STIs if you're at risk. You are at risk for STIs (including HIV) if:  You are sexually active with more than one person.  You don't use condoms every time.  You or a partner was diagnosed with a sexually transmitted infection.  If you are at risk for HIV, ask about PrEP medicine to prevent HIV.  Get tested for HIV at least once in your life, whether you are at risk for HIV or not.  Cancer screening tests  Cervical cancer screening: If you have a cervix, begin getting regular cervical cancer screening tests starting at age 21.  Breast cancer scan (mammogram): If you've ever had breasts, begin having regular mammograms starting at age 40. This is a scan to check for breast cancer.  Colon cancer screening: It is important to start screening for colon cancer at age 45.  Have a colonoscopy test every 10 years (or more often if you're at risk) Or, ask your provider about stool tests like a FIT test every year or Cologuard test every 3 years.  To learn more about your testing options, visit:   .  For help making a decision, visit:   https://bit.ly/tp64452.  Prostate cancer screening test: If you have a prostate, ask your care team if a prostate cancer screening test (PSA) at age 55 is right for you.  Lung cancer screening: If you are a current or former smoker ages 50 to 80, ask your care team if ongoing lung cancer screenings are right for you.  For informational purposes only. Not to replace the advice of your health care provider. Copyright   2023 [employer]. All rights reserved. Clinically reviewed by the [facility] Transitions Program. Yodlee [id number] - REV 01/24.
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
## 2024-12-16 RX ORDER — LEVOTHYROXINE SODIUM 100 UG/1
100 TABLET ORAL DAILY
Qty: 30 TABLET | Refills: 1 | Status: SHIPPED | OUTPATIENT
Start: 2024-12-16

## 2025-02-11 DIAGNOSIS — E03.9 ACQUIRED HYPOTHYROIDISM: ICD-10-CM

## 2025-02-22 ENCOUNTER — LAB (OUTPATIENT)
Dept: LAB | Facility: CLINIC | Age: 80
End: 2025-02-22
Payer: MEDICARE

## 2025-02-22 DIAGNOSIS — E03.9 ACQUIRED HYPOTHYROIDISM: ICD-10-CM

## 2025-02-22 DIAGNOSIS — M81.0 OSTEOPOROSIS, UNSPECIFIED OSTEOPOROSIS TYPE, UNSPECIFIED PATHOLOGICAL FRACTURE PRESENCE: ICD-10-CM

## 2025-02-22 LAB
T4 FREE SERPL-MCNC: 0.34 NG/DL (ref 0.9–1.7)
TSH SERPL DL<=0.005 MIU/L-ACNC: 84.7 UIU/ML (ref 0.3–4.2)
VIT D+METAB SERPL-MCNC: 32 NG/ML (ref 20–50)

## 2025-02-22 PROCEDURE — 84439 ASSAY OF FREE THYROXINE: CPT

## 2025-02-22 PROCEDURE — 82306 VITAMIN D 25 HYDROXY: CPT

## 2025-02-22 PROCEDURE — 36415 COLL VENOUS BLD VENIPUNCTURE: CPT

## 2025-02-22 PROCEDURE — 84443 ASSAY THYROID STIM HORMONE: CPT

## 2025-02-27 RX ORDER — LEVOTHYROXINE SODIUM 100 UG/1
100 TABLET ORAL DAILY
Qty: 30 TABLET | Refills: 0 | OUTPATIENT
Start: 2025-02-27

## 2025-05-08 ENCOUNTER — OFFICE VISIT (OUTPATIENT)
Dept: ENDOCRINOLOGY | Facility: CLINIC | Age: 80
End: 2025-05-08
Payer: MEDICARE

## 2025-05-08 VITALS
OXYGEN SATURATION: 96 % | DIASTOLIC BLOOD PRESSURE: 64 MMHG | TEMPERATURE: 96.9 F | BODY MASS INDEX: 20.28 KG/M2 | HEART RATE: 59 BPM | HEIGHT: 61 IN | WEIGHT: 107.4 LBS | SYSTOLIC BLOOD PRESSURE: 151 MMHG

## 2025-05-08 DIAGNOSIS — E03.9 ACQUIRED HYPOTHYROIDISM: ICD-10-CM

## 2025-05-08 DIAGNOSIS — M89.9 BONE DISEASE: ICD-10-CM

## 2025-05-08 DIAGNOSIS — Z92.29 PERSONAL HISTORY OF OTHER DRUG THERAPY: ICD-10-CM

## 2025-05-08 DIAGNOSIS — Z78.0 ASYMPTOMATIC MENOPAUSAL STATE: ICD-10-CM

## 2025-05-08 DIAGNOSIS — Z98.84 H/O GASTRIC BYPASS: ICD-10-CM

## 2025-05-08 DIAGNOSIS — M81.0 OSTEOPOROSIS, UNSPECIFIED OSTEOPOROSIS TYPE, UNSPECIFIED PATHOLOGICAL FRACTURE PRESENCE: Primary | ICD-10-CM

## 2025-05-08 DIAGNOSIS — Z87.81 HISTORY OF HIP FRACTURE: ICD-10-CM

## 2025-05-08 DIAGNOSIS — S72.141A CLOSED DISPLACED INTERTROCHANTERIC FRACTURE OF RIGHT FEMUR, INITIAL ENCOUNTER (H): ICD-10-CM

## 2025-05-08 DIAGNOSIS — Z92.29 HISTORY OF BISPHOSPHONATE THERAPY: ICD-10-CM

## 2025-05-08 DIAGNOSIS — Z87.81 HISTORY OF VERTEBRAL FRACTURE: ICD-10-CM

## 2025-05-08 LAB
CALCIUM SERPL-MCNC: 9.1 MG/DL (ref 8.8–10.4)
CREAT SERPL-MCNC: 0.97 MG/DL (ref 0.51–0.95)
EGFRCR SERPLBLD CKD-EPI 2021: 60 ML/MIN/1.73M2
T4 FREE SERPL-MCNC: 0.3 NG/DL (ref 0.9–1.7)
TSH SERPL DL<=0.005 MIU/L-ACNC: 77.8 UIU/ML (ref 0.3–4.2)
VIT D+METAB SERPL-MCNC: 30 NG/ML (ref 20–50)

## 2025-05-08 ASSESSMENT — PAIN SCALES - GENERAL: PAINLEVEL_OUTOF10: NO PAIN (0)
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
## 2025-05-08 NOTE — PATIENT INSTRUCTIONS
-[facility]  Dr Plata, Endocrinology Department    [facility]   [address] [id number]  [place] [address]  Appointment [phone]  Fax: [phone]  [place]: Monday - Thursday      Please check the cost coverage and copay with insurance before recommended tests, services and medications (especially if new medications are prescribed).     If ordered, please get blood work done 1 week prior to your next appointment so they will be available to Dr. Plata at your visit.    Plan for Evenity.  DEXA in 1 year-- Inform us few months prior to orders can be placed.  Follow up in 1 year.    Evinity (Romosozumab) please have medication used for treatment of osteoporosis.  The recommended dose of EVENITY is 210 mg administered subcutaneously -- it is injection (2 doses) once every month for total of 12 monthly doses. It will be administered by clinic staff.  Check cost with insurance.  Possible side effect of this medication include joint pain, headache, muscle aches, swelling of legs, fatigue, weakness, sleep problem, skin itchiness, injection site reaction.  Possible severe side effects are rare and include low calcium levels, osteonecrosis of jaw  (which can manifest as jaw pain, osteomyelitis, osteitis, bone erosion, tooth/periodontal infection, toothache, gingival ulceration/erosion), high bone fracture, heart attack, stroke, death and severe allergic reaction including swelling of the tongue and throat causing trouble breathing.  Always let your dentist lnow about the medication if plan for major dental procedure.    The pt was advised to  Maintain an adequate calcium and vitamin D intake and to supplement vitamin D if needed to maintain serum levels of 25 hydroxy D (25 OH D) between 30-60 ng/ml.  Limit alcohol intake to no more than 2 servings per day.  Limit caffeine intake.  Maintain an active lifestyle including weight-bearing exercises for at least 30  mins daily.  Take measures to reduce the risk of falling.     You should get 1000- 1200 mg/day calcium in divided doses of no more than 500 mg/dose.  This INCLUDES what is in your food as well as what is in any supplements you may be taking.    Vit D about 800-1000 IU/day ( unless you have vit D deficiency- in that case higher dose)  Dietary sources of calcium:: These also contain vitamin D  Milk                            8 oz            300 mg calcium  Yogurt                          1 cup           400 mg calcium   Hard cheese                     1.5 oz          300 mg  Cottage cheese                  2 cup           300 mg  Orange juice with Calcium       8 oz            300 mg  Low fat dairy sources are recommended        You should get 30 minutes of moderate weight bearing exercise on most days of the week .  Weight bearing exercise includes such things as walking, jogging, hiking, dancing.  You should also get Strength training 2 or more times/week in addition to other weight -being exercise. Strength training uses weight or resistance beyond that seen in everyday activities -(pilates, weight training with free weights, weight machines or resistance bands)     Living with Osteoporosis: Preventing Fractures  If you have osteoporosis, you can do a lot to reduce its effect on your life. Knowing how to prevent fractures and spinal curvature can help you live more comfortably and safely with this disease.  Reducing your risk for fractures  The most common fracture sites in people with osteoporosis are the wrist, spine, and hip. These fractures are often caused by accidents and falls. All fractures are painful and may limit what you can do. But hip fractures are very serious. They often need surgery, and it can take months to recover. To reduce your risk for fractures:  Get regular exercise. Try walking, swimming, or weight training.  Eat foods that are rich in calcium, or take calcium supplements.  Make your home  safe to help avoid accidents.  Take extra precautions not to fall in risky areas, such as icy sidewalks.  Understanding spinal fractures  Your spine is made up of many bones called vertebrae. Osteoporosis can cause the vertebrae in your spine to collapse. As a result, your upper back may arch forward, creating a curvature. Spine fractures may also result from back strain and bad posture. You will also lose height. Your lower spine must then adjust to keep your body balanced. This can cause back pain. To prevent or lessen these spinal changes:  Practice good posture.  Use proper techniques if you need to lift heavy objects.  Do back exercises to help your posture.  Lie on your back when you have pain.  Ask your healthcare provider about these and other ways to help your spine.  [employer] last reviewed this educational content on 5/1/2018 2000-2021 [employer]. All rights reserved. This information is not intended as a substitute for professional medical care. Always follow your healthcare professional's instructions.          Living with Osteoporosis: Regular Exercise  If you have osteoporosis, exercise is vital for your health. It can prevent bone fractures and spine changes. It will slow bone loss. Exercise will strengthen your body. It can also be fun. A variety of exercises is best. See below for exercises that can help you. But before you start, talk with your healthcare provider to be sure these exercises are right for you.   Resistance exercises. These build muscle strength and maintain bone mass. They also make you less prone to injury. Exercises include lifting small weights, doing push-ups and sit-ups, using elastic exercise bands, and using weight machines.   Weight-bearing activities. These help your whole body. They also help you maintain bone mass. Activities include walking, dancing, and housework.   Non-weight-bearing exercises. These help prevent back strain and pain. They do this by  building the trunk and leg muscles. Exercises that help with flexibility can prevent falls. Examples include swimming, water exercise, and stretching.   Staying safe  Here are tips to stay safe:   Always check with your healthcare provider before starting any new exercise program.  Use weights only as instructed.  Stop any exercise that causes pain.  [employer] last reviewed this educational content on 5/1/2018 2000-2021 [employer]. All rights reserved. This information is not intended as a substitute for professional medical care. Always follow your healthcare professional's instructions.          Preventing Osteoporosis: Avoiding Bone Loss  Certain factors can speed up bone loss or decrease bone growth. For example, alcohol, cigarettes, and certain medicines reduce bone mass. Some foods make it hard for your body to absorb calcium.  Things to avoid  Here are things to avoid to help prevent osteoporosis:  Alcohol. This is toxic to bones. It is a major cause of bone loss. Heavy drinking can cause osteoporosis even if you have no other risk factors.  Smoking. This reduces bone mass. Smoking may also interfere with estrogen levels and cause early menopause.  Inactivity. Not being active makes your bones lose strength and become thinner. Over time, thin bones may break. Women who aren't active are at a high risk for osteoporosis.  Certain medicines. Some medicines, such as cortisone, increase bone loss. They also decrease bone growth. Ask your healthcare provider about any side effects of your medicines, and how to prevent them.  Protein-rich or salty foods. Eaten in large amounts, these foods may deplete calcium.  Caffeine. This increases calcium loss. People who drink a lot of coffee, tea, or soda lose more calcium than those who don't.  [employer] last reviewed this educational content on 5/1/2018 2000-2021 [employer]. All rights reserved. This information is not intended as a  substitute for professional medical care. Always follow your healthcare professional's instructions.          Preventing Osteoporosis: Meeting Your Calcium Needs  Your body needs calcium to build and repair bones. But it can't make calcium on its own. That's why it's important to eat calcium-rich foods. Some foods are naturally rich in calcium. Others have calcium added (fortified). It's best to get calcium from the foods you eat. But if you can't get enough, you may want to take calcium supplements. To meet your daily calcium needs, try the foods listed below.                          Dairy Fish & beans Other sources   Source   Calcium (mg) per serving   Source   Calcium (mg) per serving   Source   Calcium (mg) per serving   Low-fat yogurt, plain   415 mg/8 oz.   Sardines, Atlantic, canned, with bones   351 mg/3 oz.   Oatmeal, instant, fortified   215 mg/1 cup   Nonfat milk   302 mg/1 cup   Marcus, sockeye, canned, with bones   239 mg/3 oz.   Tofu made with calcium sulfate   204 mg/3 oz.   Low-fat milk   297 mg/1 cup   Soybeans, fresh, boiled   131 mg/1/2 cup   Collards   179 mg/1/2 cup   Swiss cheese   272 mg/1 oz.   White beans, cooked   81 mg/1/2 cup   English muffin, whole wheat   175 mg/1 muffin   Cheddar cheese   205 mg/1 oz.   Navy beans, cooked   79 mg/1/2 cup   Kale   90 mg/1/2 cup   Ice cream strawberry   79 mg/1/2 cup           Orange, navel   56 mg/1 medium   Note: Calcium levels may vary depending on brand and size.  Daily calcium needs  14 to 18 years old: 1,300 mg  19 to 30 years old: 1,000 mg  31 to 50 years old: 1,000 mg  51 to 70 years old, women: 1,200 mg  51 to 70 years old, men: 1,000 mg  Pregnant or nursin to 18 years old: 1,300 mg, 19 to 50 years old: 1,000 mg  Older than 70 (women and men): 1,200 mg   [employer] last reviewed this educational content on 2018-2021 [employer]. All rights reserved. This information is not intended as a substitute for professional  medical care. Always follow your healthcare professional's instructions.

## 2025-05-12 ENCOUNTER — TELEPHONE (OUTPATIENT)
Dept: ENDOCRINOLOGY | Facility: CLINIC | Age: 80
End: 2025-05-12
Payer: MEDICARE

## 2025-05-12 NOTE — TELEPHONE ENCOUNTER
Attempt #1    Left voicemail for patient to call clinic back. Patient should be scheduled on RN schedule 2 weeks out and then encounter should be sent to CAM finance team.     Summer RN 12:34 PM May 12, 2025   [facility]

## 2025-05-12 NOTE — TELEPHONE ENCOUNTER
Evenity ordered for her. She would like to get it done at [place]. Pls help with scheduling.    Dr. Plata

## 2025-05-13 NOTE — TELEPHONE ENCOUNTER
Attempt # 2  Called Phone # [phone]      Was Call answered? No     Non-detailed voicemail left on May 13, 2025 11:22 AM to call clinic at: [phone].     On Call Back:     Please relay writer attempting to reach out to help schedule Evenity injection on RN schedule. Please schedule at least two weeks out to allow time for CAM finance to do prior authorization for Evenity.    Also sent [employer].      Thank you,  [place], Triage JOSÉ Sellers    11:22 AM 5/13/2025

## 2025-05-14 ENCOUNTER — RESULTS FOLLOW-UP (OUTPATIENT)
Dept: FAMILY MEDICINE | Facility: CLINIC | Age: 80
End: 2025-05-14

## 2025-05-14 DIAGNOSIS — E03.9 ACQUIRED HYPOTHYROIDISM: Primary | ICD-10-CM

## 2025-05-14 NOTE — TELEPHONE ENCOUNTER
Called patient and assisted in scheduling appointment for Evenity.      Forwarded message to CAM team to review PA.      Next 5 appointments (look out 90 days)      May 30, 2025 10:00 AM  Nurse Only with Ri Rn  [facility] ([facility] ) [address]  [address]  [facility] [id number] [phone]

## 2025-05-14 NOTE — TELEPHONE ENCOUNTER
Pt calling back     Advised on the notes from below:   Nancy Lopez APRN CNP to [facility]         5/14/25  9:29 AM  Please call patient.   Her TSH is very low and her thyroid hormone is high. I dropped her dose, but I see all three doses are still active. Please clarify which dose she is on. Also verify she is asymptomatic.   Please ask her to reach out to her endocrinologist (she just saw them) to get recommendations.   JAYCEE Reynoso CNP  _______________________________________________________________    Pt stated that she is taking 112 mcg daily of her synthroid - she is not taking her meds every day, it is when she thinks about it - its once every 2-3 days - she does have a pill box she just forgets to take her pill.  Pt stated that she is sleepy all the time but has no other symptoms     Please advise     Thank you     Alicia Castro RN, BSN  [facility]

## 2025-05-15 NOTE — TELEPHONE ENCOUNTER
Per routing comment:    Candie Schneider to Me (Selected Message)  ETTA      5/15/25 12:31 PM  APPROVED

## 2025-06-02 ENCOUNTER — PATIENT OUTREACH (OUTPATIENT)
Dept: CARE COORDINATION | Facility: CLINIC | Age: 80
End: 2025-06-02
Payer: MEDICARE

## 2025-06-18 ENCOUNTER — TELEPHONE (OUTPATIENT)
Dept: ENDOCRINOLOGY | Facility: CLINIC | Age: 80
End: 2025-06-18
Payer: MEDICARE

## 2025-06-18 DIAGNOSIS — E03.9 ACQUIRED HYPOTHYROIDISM: ICD-10-CM

## 2025-06-18 RX ORDER — LEVOTHYROXINE SODIUM 112 UG/1
112 TABLET ORAL
Qty: 90 TABLET | Refills: 1 | Status: CANCELLED | OUTPATIENT
Start: 2025-06-18

## 2025-06-18 NOTE — TELEPHONE ENCOUNTER
New referral for thyroid placed.  Can she be scheduled in a return slot or new?    Nasreen Boucher [facility] Endocrinology [place]  [phone]

## 2025-06-18 NOTE — TELEPHONE ENCOUNTER
PT calls for refill of her Levothyroxine.   Confirmed pt is taking 112 mcg daily.     Please discontinue the 75 mcg and 100 mcg off med list, if appropriate, to avoid future ordering errors.     Message from Nancy Lopez CNP in May, that pt is supposed to schedule with Endocrinology. Pt has not done this yet. She sees Dr Plata for Osteoporosis but has not scheduled for HypoThyroid yet.     Advised pt to schedule with Dr Plata OR Marley Henriquez now for her hypothyroid. She agree with this, transferred pt to Endo scheduling.     Provider approval needed for the refill.       Hi,   Looks like the nurses couldn't reach you. If you are inconsistently taking your medication and still running too high, I think you should see endocrinology about your thyroid management. I placed a referral.   Reach out with questions.   JAYCEE Reynoso CNP      Written by JAYCEE Lockwood CNP on 5/29/2025 12:30 PM CDT    TSH   Date Value Ref Range Status   05/08/2025 77.80 (H) 0.30 - 4.20 uIU/mL Final   02/22/2025 84.70 (H) 0.30 - 4.20 uIU/mL Final   12/12/2024 89.20 (H) 0.30 - 4.20 uIU/mL Final   02/01/2017 0.65 0.40 - 4.00 mU/L Final

## 2025-06-19 RX ORDER — LEVOTHYROXINE SODIUM 137 UG/1
137 TABLET ORAL
Qty: 60 TABLET | Refills: 0 | Status: SHIPPED | OUTPATIENT
Start: 2025-06-19

## 2025-06-19 NOTE — TELEPHONE ENCOUNTER
Per Dr. Plata, it can be a return slot for Dr. Plata or Marley Henriquez PA-C.    Nasreen Boucher, [facility]  [phone]

## 2025-06-19 NOTE — TELEPHONE ENCOUNTER
Spoke with pt who states she is only having fatigue for symptoms currently. Advised of new Rx and appt scheduled for lab.    Advised to ensure to call and schedule appointment with Endo for thyroid. Pt agreeable to appointment.    Adriane TONG, RN, PHN    Adriane ELLISONN, RN, PHN

## 2025-06-19 NOTE — TELEPHONE ENCOUNTER
Still recommending she follow up with endocrinology. I will increase her dose for now. She will need labs checked in 6-8 weeks after the dose increase. Please make sure she understands and knows what to watch for in terms of hypo/hyper symptoms.   JAYCEE Reynoso CNP

## 2025-07-23 ENCOUNTER — LAB (OUTPATIENT)
Dept: LAB | Facility: CLINIC | Age: 80
End: 2025-07-23
Payer: MEDICARE

## 2025-07-23 DIAGNOSIS — E03.9 ACQUIRED HYPOTHYROIDISM: ICD-10-CM

## 2025-07-23 DIAGNOSIS — Z13.6 SCREENING FOR CARDIOVASCULAR CONDITION: ICD-10-CM

## 2025-07-23 PROCEDURE — 36415 COLL VENOUS BLD VENIPUNCTURE: CPT

## 2025-07-23 PROCEDURE — 80061 LIPID PANEL: CPT

## 2025-07-23 PROCEDURE — 82565 ASSAY OF CREATININE: CPT

## 2025-07-23 PROCEDURE — 84439 ASSAY OF FREE THYROXINE: CPT

## 2025-07-23 PROCEDURE — 84443 ASSAY THYROID STIM HORMONE: CPT

## 2025-07-24 LAB
CHOLEST SERPL-MCNC: 244 MG/DL
CREAT SERPL-MCNC: 0.86 MG/DL (ref 0.51–0.95)
EGFRCR SERPLBLD CKD-EPI 2021: 68 ML/MIN/1.73M2
FASTING STATUS PATIENT QL REPORTED: NO
HDLC SERPL-MCNC: 71 MG/DL
LDLC SERPL CALC-MCNC: 152 MG/DL
NONHDLC SERPL-MCNC: 173 MG/DL
T4 FREE SERPL-MCNC: 0.26 NG/DL (ref 0.9–1.7)
TRIGL SERPL-MCNC: 103 MG/DL
TSH SERPL DL<=0.005 MIU/L-ACNC: 67.7 UIU/ML (ref 0.3–4.2)

## 2025-07-31 NOTE — PROGRESS NOTES
Covered this while ANIKET was out. Dr. Higuera does not manage her thyroid at this point.     Please call Debbie to discuss likelihood she is NOT taking her levothyroxine.     She should resume taking.     Please forward to ANIKET to place orders for recheck labs in 6-8 weeks, so the results will go to her as the originating provider.

## 2025-07-31 NOTE — PROGRESS NOTES
Spoke with pt who states she takes her medication inconsistently. Reminded pt to ensure taking consistently and to take it daily 30 minutes prior to eating/drinking. Pt will call back to schedule lab appointment.    Adriane TONG, RN, PHN

## 2025-08-19 DIAGNOSIS — E03.9 ACQUIRED HYPOTHYROIDISM: ICD-10-CM

## 2025-08-20 RX ORDER — LEVOTHYROXINE SODIUM 137 UG/1
137 TABLET ORAL
Qty: 60 TABLET | Refills: 0 | Status: SHIPPED | OUTPATIENT
Start: 2025-08-20

## (undated) DEVICE — PACK HAND SOP32HARMO

## (undated) DEVICE — BNDG ELASTIC 4" DBL LENGTH UNSTERILE 6611-14

## (undated) DEVICE — SU VICRYL 3-0 SH 27" J316H

## (undated) DEVICE — DRILL BIT STRK GAMMA 4.2X300MM  1320-3042S

## (undated) DEVICE — DRSG KERLIX FLUFFS X5

## (undated) DEVICE — LINEN HALF SHEET 5512

## (undated) DEVICE — SOL ADH LIQUID BENZOIN SWAB 0.6ML C1544

## (undated) DEVICE — IMPLANTABLE DEVICE: Type: IMPLANTABLE DEVICE | Site: WRIST | Status: NON-FUNCTIONAL

## (undated) DEVICE — SUCTION CANISTER MEDIVAC LINER 3000ML W/LID 65651-530

## (undated) DEVICE — GLOVE BIOGEL PI MICRO INDICATOR UNDERGLOVE SZ 7.0 48970

## (undated) DEVICE — Device

## (undated) DEVICE — DRILL BIT ACUMED QUICK COUPLER 2.0MM 80-0318

## (undated) DEVICE — GLOVE BIOGEL PI SZ 8.5 40885

## (undated) DEVICE — GLOVE BIOGEL PI MICRO INDICATOR UNDERGLOVE SZ 7.5 48975

## (undated) DEVICE — SOL NACL 0.9% INJ 1000ML BAG 07983-09

## (undated) DEVICE — STPL SKIN 35W 6.9MM  PXW35

## (undated) DEVICE — PACK HIP NAILING SOP32HPFC4

## (undated) DEVICE — BAG CLEAR TRASH 1.3M 39X33" P4040C

## (undated) DEVICE — TUBING SUCTION 12"X1/4" N612

## (undated) DEVICE — GOWN IMPERVIOUS SPECIALTY XLG/XLONG 32474

## (undated) DEVICE — SU VICRYL 0 CT-1 36" J346H

## (undated) DEVICE — DRSG STERI STRIP 1/2X4" R1547

## (undated) DEVICE — CAST PADDING 4" STERILE 9044S

## (undated) DEVICE — CAST PADDING 4" WEBRIL STERILE 2847

## (undated) DEVICE — SU VICRYL 0 CT-2 27" J334H

## (undated) DEVICE — WIRE GUIDE 0.054X6" ACUTRAK SGL TROCAR TIP SS WS-1406ST

## (undated) DEVICE — LINEN FULL SHEET 5511

## (undated) DEVICE — SU VICRYL 2-0 CT-1 36" UND J945H

## (undated) DEVICE — CAST BUCKET

## (undated) DEVICE — SU MONOCRYL 4-0 PS-2 27" UND Y426H

## (undated) DEVICE — PREP CHLORAPREP 26ML TINTED HI-LITE ORANGE 930815

## (undated) DEVICE — SLING ARM LG 79-99157

## (undated) DEVICE — PAD CHUX UNDERPAD 30X36" P3036C

## (undated) DEVICE — CAST PADDING 4" UNSTERILE 9044

## (undated) DEVICE — DRSG GAUZE 4X4" TRAY

## (undated) DEVICE — DRILL BIT 2.8MM QUICK RELEASE MS-DC28

## (undated) DEVICE — DECANTER BAG 2002S

## (undated) DEVICE — GLOVE BIOGEL PI SZ 7.0 40870

## (undated) DEVICE — LINEN ORTHO ACL PACK 5447

## (undated) DEVICE — GLOVE BIOGEL PI MICRO INDICATOR UNDERGLOVE SZ 8.5 48985

## (undated) DEVICE — ESU GROUND PAD ADULT W/CORD E7507

## (undated) DEVICE — GLOVE BIOGEL PI SZ 6.5 40865

## (undated) DEVICE — TOURNIQUET SGL BLADDER 18"X4" RED 5921-218-135

## (undated) RX ORDER — HYDROMORPHONE HYDROCHLORIDE 1 MG/ML
INJECTION, SOLUTION INTRAMUSCULAR; INTRAVENOUS; SUBCUTANEOUS
Status: DISPENSED
Start: 2023-09-26

## (undated) RX ORDER — FENTANYL CITRATE 50 UG/ML
INJECTION, SOLUTION INTRAMUSCULAR; INTRAVENOUS
Status: DISPENSED
Start: 2023-09-26

## (undated) RX ORDER — FENTANYL CITRATE-0.9 % NACL/PF 10 MCG/ML
PLASTIC BAG, INJECTION (ML) INTRAVENOUS
Status: DISPENSED
Start: 2023-09-26

## (undated) RX ORDER — BUPIVACAINE HYDROCHLORIDE AND EPINEPHRINE 2.5; 5 MG/ML; UG/ML
INJECTION, SOLUTION EPIDURAL; INFILTRATION; INTRACAUDAL; PERINEURAL
Status: DISPENSED
Start: 2023-09-27

## (undated) RX ORDER — CEFAZOLIN SODIUM/WATER 2 G/20 ML
SYRINGE (ML) INTRAVENOUS
Status: DISPENSED
Start: 2023-09-26

## (undated) RX ORDER — GLYCOPYRROLATE 0.2 MG/ML
INJECTION INTRAMUSCULAR; INTRAVENOUS
Status: DISPENSED
Start: 2023-09-26

## (undated) RX ORDER — DEXMEDETOMIDINE HYDROCHLORIDE 4 UG/ML
INJECTION, SOLUTION INTRAVENOUS
Status: DISPENSED
Start: 2023-09-26

## (undated) RX ORDER — FENTANYL CITRATE 50 UG/ML
INJECTION, SOLUTION INTRAMUSCULAR; INTRAVENOUS
Status: DISPENSED
Start: 2023-09-27

## (undated) RX ORDER — GINSENG 100 MG
CAPSULE ORAL
Status: DISPENSED
Start: 2023-09-27

## (undated) RX ORDER — TRANEXAMIC ACID 10 MG/ML
INJECTION, SOLUTION INTRAVENOUS
Status: DISPENSED
Start: 2023-09-26

## (undated) RX ORDER — ONDANSETRON 2 MG/ML
INJECTION INTRAMUSCULAR; INTRAVENOUS
Status: DISPENSED
Start: 2023-09-26

## (undated) RX ORDER — DEXAMETHASONE SODIUM PHOSPHATE 4 MG/ML
INJECTION, SOLUTION INTRA-ARTICULAR; INTRALESIONAL; INTRAMUSCULAR; INTRAVENOUS; SOFT TISSUE
Status: DISPENSED
Start: 2023-09-26

## (undated) RX ORDER — PROPOFOL 10 MG/ML
INJECTION, EMULSION INTRAVENOUS
Status: DISPENSED
Start: 2023-09-26

## (undated) RX ORDER — CEFAZOLIN SODIUM/WATER 2 G/20 ML
SYRINGE (ML) INTRAVENOUS
Status: DISPENSED
Start: 2023-09-27

## (undated) RX ORDER — LABETALOL HYDROCHLORIDE 5 MG/ML
INJECTION, SOLUTION INTRAVENOUS
Status: DISPENSED
Start: 2023-09-26